# Patient Record
Sex: FEMALE | Race: WHITE | NOT HISPANIC OR LATINO | Employment: FULL TIME | URBAN - METROPOLITAN AREA
[De-identification: names, ages, dates, MRNs, and addresses within clinical notes are randomized per-mention and may not be internally consistent; named-entity substitution may affect disease eponyms.]

---

## 2019-04-05 ENCOUNTER — OFFICE VISIT (OUTPATIENT)
Dept: CARDIOLOGY CLINIC | Facility: CLINIC | Age: 49
End: 2019-04-05
Payer: COMMERCIAL

## 2019-04-05 VITALS
DIASTOLIC BLOOD PRESSURE: 92 MMHG | WEIGHT: 287 LBS | BODY MASS INDEX: 43.5 KG/M2 | HEART RATE: 71 BPM | HEIGHT: 68 IN | SYSTOLIC BLOOD PRESSURE: 140 MMHG

## 2019-04-05 DIAGNOSIS — R00.2 PALPITATIONS: ICD-10-CM

## 2019-04-05 DIAGNOSIS — I10 HYPERTENSION, UNSPECIFIED TYPE: Primary | ICD-10-CM

## 2019-04-05 DIAGNOSIS — R07.9 CHEST PAIN, UNSPECIFIED TYPE: ICD-10-CM

## 2019-04-05 PROCEDURE — 93000 ELECTROCARDIOGRAM COMPLETE: CPT | Performed by: INTERNAL MEDICINE

## 2019-04-05 PROCEDURE — 99243 OFF/OP CNSLTJ NEW/EST LOW 30: CPT | Performed by: INTERNAL MEDICINE

## 2019-04-05 RX ORDER — HYDROCHLOROTHIAZIDE 25 MG/1
25 TABLET ORAL DAILY
Qty: 30 TABLET | Refills: 11 | Status: SHIPPED | OUTPATIENT
Start: 2019-04-05 | End: 2020-03-25 | Stop reason: SDUPTHER

## 2019-04-05 RX ORDER — CYCLOSPORINE 0.5 MG/ML
1 EMULSION OPHTHALMIC 2 TIMES DAILY
COMMUNITY

## 2019-04-05 RX ORDER — ASPIRIN 81 MG/1
81 TABLET ORAL
COMMUNITY
End: 2020-05-26

## 2019-04-05 RX ORDER — METOPROLOL TARTRATE 50 MG/1
50 TABLET, FILM COATED ORAL 2 TIMES DAILY
Refills: 2 | COMMUNITY
Start: 2019-02-08 | End: 2021-12-01 | Stop reason: SDUPTHER

## 2019-04-05 RX ORDER — FLUTICASONE PROPIONATE 50 MCG
SPRAY, SUSPENSION (ML) NASAL
Refills: 4 | COMMUNITY
Start: 2019-01-15

## 2019-04-05 RX ORDER — MONTELUKAST SODIUM 10 MG/1
TABLET ORAL
Refills: 2 | COMMUNITY
Start: 2019-02-08 | End: 2021-05-03 | Stop reason: ALTCHOICE

## 2019-04-05 RX ORDER — LISINOPRIL 10 MG/1
TABLET ORAL
Refills: 4 | COMMUNITY
Start: 2019-03-28 | End: 2021-12-01 | Stop reason: SDUPTHER

## 2019-04-05 RX ORDER — AMLODIPINE BESYLATE 10 MG/1
TABLET ORAL
Refills: 2 | COMMUNITY
Start: 2019-02-08 | End: 2021-12-01 | Stop reason: SDUPTHER

## 2019-05-17 ENCOUNTER — HOSPITAL ENCOUNTER (OUTPATIENT)
Dept: NON INVASIVE DIAGNOSTICS | Facility: CLINIC | Age: 49
Discharge: HOME | End: 2019-05-17
Payer: COMMERCIAL

## 2019-05-17 DIAGNOSIS — I10 HYPERTENSION, UNSPECIFIED TYPE: ICD-10-CM

## 2019-05-17 PROCEDURE — 93306 TTE W/DOPPLER COMPLETE: CPT

## 2019-05-17 PROCEDURE — 93306 TTE W/DOPPLER COMPLETE: CPT | Performed by: INTERNAL MEDICINE

## 2019-05-23 ENCOUNTER — TRANSCRIBE ORDERS (OUTPATIENT)
Dept: ADMINISTRATIVE | Facility: HOSPITAL | Age: 49
End: 2019-05-23

## 2019-05-23 ENCOUNTER — APPOINTMENT (OUTPATIENT)
Dept: RADIOLOGY | Facility: CLINIC | Age: 49
End: 2019-05-23
Payer: COMMERCIAL

## 2019-05-23 DIAGNOSIS — M46.1 SACROILIITIS, NOT ELSEWHERE CLASSIFIED (HCC): ICD-10-CM

## 2019-05-23 DIAGNOSIS — M46.1 SACROILIITIS, NOT ELSEWHERE CLASSIFIED (HCC): Primary | ICD-10-CM

## 2019-05-23 PROCEDURE — 73521 X-RAY EXAM HIPS BI 2 VIEWS: CPT

## 2019-05-23 PROCEDURE — 72202 X-RAY EXAM SI JOINTS 3/> VWS: CPT

## 2019-09-11 ENCOUNTER — TRANSCRIBE ORDERS (OUTPATIENT)
Dept: ADMINISTRATIVE | Facility: HOSPITAL | Age: 49
End: 2019-09-11

## 2019-09-11 ENCOUNTER — APPOINTMENT (OUTPATIENT)
Dept: LAB | Facility: CLINIC | Age: 49
End: 2019-09-11
Payer: COMMERCIAL

## 2019-09-11 DIAGNOSIS — I10 HYPERTENSION, UNSPECIFIED TYPE: ICD-10-CM

## 2019-09-11 DIAGNOSIS — R00.2 PALPITATIONS: ICD-10-CM

## 2019-09-11 LAB
CHOLEST SERPL-MCNC: 174 MG/DL (ref 50–200)
HDLC SERPL-MCNC: 36 MG/DL (ref 40–60)
LDLC SERPL CALC-MCNC: 78 MG/DL (ref 0–100)
TRIGL SERPL-MCNC: 299 MG/DL

## 2019-09-11 PROCEDURE — 36415 COLL VENOUS BLD VENIPUNCTURE: CPT

## 2019-09-11 PROCEDURE — 80061 LIPID PANEL: CPT

## 2019-09-13 NOTE — PROGRESS NOTES
Office Progress Note - Cardiology     Soco Cat 51 y o  female MRN: [id number]    Assessment/Plan:    1  HTN  On Metoprolol tartrate 50 bid, Lisinopril 10, Amlodipine 10 mg daily  HCTZ 25 daily added 4/19  BP controlled/improved in office today  2  Health maintenance  Lipid panel 9/19 showed total cholesterol 174, , HDL 36, LDL 78  She reports she does not believe she eats a lot of carbs/high fat foods  Advised her to try fish oil to help lower TG in long term  3  Chest pain  Atypical  Will defer stress test for now as symptoms not related to exertion  1  Essential hypertension     2  Hypertriglyceridemia     3  Atypical chest pain         HPI: 51 y o  female who is here for follow up of HTN  She reports she has been taking BP meds since around 2000, in her late 25s  She is on Metoprolol, Lisinopril, Amlodipine was prescribed 11/18 due to continued elevated BP  At visit in 4/19, due to slightly elevated BP, HCTZ was added  Echo 5/19 showed normal LV size and function, no diagnostic RWMA, EF 60%  Grade II diastolic dysfunction  Normal RV size and function  She has gained about 20 lbs in last several years, she is trying to lose some weight, has about about 7 lbs on our scale  She is trying to walk for exercise, about 2 miles, no exertional CP or SOB  No orthopnea, uses 1 pillow to sleep, no PND  She does not believes she snores significantly  No current significant LE edema, did have some previously which she had attributed to knee surgery  She occasionally gets "side sticker" type chest pain, not with exertion  Father had MI in mid 46s  She works 12 hour nights as a   Review of Systems:    Review of Systems   Constitution: Positive for weight loss  Negative for chills, decreased appetite, diaphoresis, fever, malaise/fatigue, night sweats and weight gain  HENT: Negative for ear pain, hearing loss, hoarse voice, nosebleeds, sore throat and tinnitus      Eyes: Negative for blurred vision and pain  Cardiovascular: Negative  Negative for chest pain, claudication, cyanosis, dyspnea on exertion, irregular heartbeat, leg swelling, near-syncope, orthopnea, palpitations, paroxysmal nocturnal dyspnea and syncope  Respiratory: Negative for cough, hemoptysis, shortness of breath, sleep disturbances due to breathing, snoring, sputum production and wheezing  Hematologic/Lymphatic: Negative for adenopathy and bleeding problem  Does not bruise/bleed easily  Skin: Negative for color change, dry skin, flushing, itching, poor wound healing and rash  Musculoskeletal: Positive for arthritis and joint pain  Negative for back pain, falls, muscle cramps, muscle weakness, myalgias and neck pain  Gastrointestinal: Negative for abdominal pain, constipation, diarrhea, dysphagia, heartburn, hematemesis, hematochezia, melena, nausea and vomiting  Genitourinary: Negative for dysuria, frequency, hematuria, hesitancy, non-menstrual bleeding and urgency  Neurological: Negative for excessive daytime sleepiness, dizziness, focal weakness, headaches, light-headedness, loss of balance, numbness, paresthesias, tremors, vertigo and weakness  Psychiatric/Behavioral: Negative for altered mental status, depression and memory loss  The patient does not have insomnia and is not nervous/anxious  Allergic/Immunologic: Positive for environmental allergies  Negative for persistent infections       Active Problems:     Patient Active Problem List   Diagnosis    Hypertension       Historical Information   Past Medical History:   Diagnosis Date    Diverticulosis     Dry eye     Seasonal allergies     Thyroid nodule      Past Surgical History:   Procedure Laterality Date    ARTHROSCOPIC REPAIR ACL Left     CHOLECYSTECTOMY      HYSTERECTOMY  2010    KNEE ARTHROSCOPY Right     REDUCTION MAMMAPLASTY  2016     Social History     Substance and Sexual Activity   Alcohol Use Not on file     Social History     Substance and Sexual Activity   Drug Use Not on file     Social History     Tobacco Use   Smoking Status Never Smoker   Smokeless Tobacco Never Used       Family History:   Family History   Problem Relation Age of Onset    Hypertension Mother     Hyperlipidemia Mother     Diabetes type II Mother     Hypertension Father     Heart attack Father     Hyperlipidemia Father     Hypertension Maternal Grandmother        No Known Allergies      Current Outpatient Medications:     amLODIPine (NORVASC) 10 mg tablet, , Disp: , Rfl: 2    cycloSPORINE (RESTASIS) 0 05 % ophthalmic emulsion, 1 drop 2 (two) times a day, Disp: , Rfl:     hydrochlorothiazide (HYDRODIURIL) 25 mg tablet, Take 1 tablet (25 mg total) by mouth daily, Disp: 30 tablet, Rfl: 11    lisinopril (ZESTRIL) 10 mg tablet, , Disp: , Rfl: 4    meloxicam (MOBIC) 15 mg tablet, , Disp: , Rfl: 4    metoprolol tartrate (LOPRESSOR) 50 mg tablet, , Disp: , Rfl: 2    montelukast (SINGULAIR) 10 mg tablet, , Disp: , Rfl: 2    omeprazole (PriLOSEC) 20 mg delayed release capsule, , Disp: , Rfl: 4    aspirin (ECOTRIN LOW STRENGTH) 81 mg EC tablet, Take 81 mg by mouth, Disp: , Rfl:     cyclobenzaprine (FLEXERIL) 10 mg tablet, , Disp: , Rfl: 0    fluticasone (FLONASE) 50 mcg/act nasal spray, , Disp: , Rfl: 4    Objective   Vitals:   Vitals:    09/16/19 0945   BP: 124/86   BP Location: Left arm   Patient Position: Sitting   Cuff Size: Large   Pulse: 74   Weight: 127 kg (280 lb 8 oz)   Height: 5' 8" (1 727 m)          Physical Exam:     GEN: Alert and oriented x 3, in no acute distress  Well appearing and well nourished  HEENT: Sclera anicteric, conjunctivae pink, mucous membranes moist  Oropharynx clear  NECK: Supple, no carotid bruits, no significant JVD  Trachea midline, no thyromegaly  HEART: Regular rhythm, normal S1 and S2, no murmurs, clicks, gallops or rubs  PMI nondisplaced, no thrills     LUNGS: Clear to auscultation bilaterally; no wheezes, rales, or rhonchi  No increased work of breathing or signs of respiratory distress  ABDOMEN: Obese, soft, nontender, nondistended, normoactive bowel sounds  EXTREMITIES: Skin warm and well perfused, no clubbing, cyanosis, or edema  NEURO: No focal findings  Normal gait  Normal speech  Mood and affect normal    SKIN: Normal without suspicious lesions on exposed skin      Lab Results:     No results found for: HGBA1C  No results found for: CHOL  Lab Results   Component Value Date    HDL 36 (L) 09/11/2019     Lab Results   Component Value Date    LDLCALC 78 09/11/2019     Lab Results   Component Value Date    TRIG 299 (H) 09/11/2019     No results found for: CHOLHDL

## 2019-09-16 ENCOUNTER — OFFICE VISIT (OUTPATIENT)
Dept: CARDIOLOGY CLINIC | Facility: CLINIC | Age: 49
End: 2019-09-16
Payer: COMMERCIAL

## 2019-09-16 VITALS
WEIGHT: 280.5 LBS | HEART RATE: 74 BPM | BODY MASS INDEX: 42.51 KG/M2 | HEIGHT: 68 IN | SYSTOLIC BLOOD PRESSURE: 124 MMHG | DIASTOLIC BLOOD PRESSURE: 86 MMHG

## 2019-09-16 DIAGNOSIS — E78.1 HYPERTRIGLYCERIDEMIA: ICD-10-CM

## 2019-09-16 DIAGNOSIS — R07.89 ATYPICAL CHEST PAIN: ICD-10-CM

## 2019-09-16 DIAGNOSIS — I10 ESSENTIAL HYPERTENSION: Primary | ICD-10-CM

## 2019-09-16 PROCEDURE — 3079F DIAST BP 80-89 MM HG: CPT | Performed by: INTERNAL MEDICINE

## 2019-09-16 PROCEDURE — 99214 OFFICE O/P EST MOD 30 MIN: CPT | Performed by: INTERNAL MEDICINE

## 2019-09-16 PROCEDURE — 3074F SYST BP LT 130 MM HG: CPT | Performed by: INTERNAL MEDICINE

## 2019-09-16 RX ORDER — MELOXICAM 15 MG/1
TABLET ORAL
Refills: 4 | COMMUNITY
Start: 2019-08-18 | End: 2021-05-03 | Stop reason: ALTCHOICE

## 2019-09-16 RX ORDER — CYCLOBENZAPRINE HCL 10 MG
TABLET ORAL
Refills: 0 | COMMUNITY
Start: 2019-06-12 | End: 2020-05-26

## 2020-03-25 DIAGNOSIS — I10 HYPERTENSION, UNSPECIFIED TYPE: ICD-10-CM

## 2020-03-25 RX ORDER — HYDROCHLOROTHIAZIDE 25 MG/1
25 TABLET ORAL DAILY
Qty: 90 TABLET | Refills: 3 | Status: SHIPPED | OUTPATIENT
Start: 2020-03-25 | End: 2021-01-02 | Stop reason: HOSPADM

## 2020-05-26 ENCOUNTER — APPOINTMENT (OUTPATIENT)
Dept: RADIOLOGY | Facility: CLINIC | Age: 50
End: 2020-05-26
Payer: COMMERCIAL

## 2020-05-26 ENCOUNTER — OFFICE VISIT (OUTPATIENT)
Dept: URGENT CARE | Facility: CLINIC | Age: 50
End: 2020-05-26
Payer: COMMERCIAL

## 2020-05-26 VITALS
TEMPERATURE: 98 F | OXYGEN SATURATION: 100 % | DIASTOLIC BLOOD PRESSURE: 73 MMHG | RESPIRATION RATE: 16 BRPM | SYSTOLIC BLOOD PRESSURE: 150 MMHG | HEIGHT: 68 IN | BODY MASS INDEX: 40.92 KG/M2 | HEART RATE: 90 BPM | WEIGHT: 270 LBS

## 2020-05-26 DIAGNOSIS — S69.91XA INJURY OF RIGHT WRIST, INITIAL ENCOUNTER: ICD-10-CM

## 2020-05-26 DIAGNOSIS — S69.91XA INJURY OF RIGHT HAND, INITIAL ENCOUNTER: ICD-10-CM

## 2020-05-26 DIAGNOSIS — S69.91XA INJURY OF RIGHT HAND, INITIAL ENCOUNTER: Primary | ICD-10-CM

## 2020-05-26 DIAGNOSIS — S61.219A LACERATION WITHOUT FOREIGN BODY OF UNSPECIFIED FINGER WITHOUT DAMAGE TO NAIL, INITIAL ENCOUNTER: ICD-10-CM

## 2020-05-26 PROCEDURE — 90715 TDAP VACCINE 7 YRS/> IM: CPT | Performed by: PHYSICIAN ASSISTANT

## 2020-05-26 PROCEDURE — 29125 APPL SHORT ARM SPLINT STATIC: CPT | Performed by: PHYSICIAN ASSISTANT

## 2020-05-26 PROCEDURE — 99213 OFFICE O/P EST LOW 20 MIN: CPT | Performed by: PHYSICIAN ASSISTANT

## 2020-05-26 PROCEDURE — 90471 IMMUNIZATION ADMIN: CPT | Performed by: PHYSICIAN ASSISTANT

## 2020-05-26 PROCEDURE — 73130 X-RAY EXAM OF HAND: CPT

## 2020-05-26 PROCEDURE — 73110 X-RAY EXAM OF WRIST: CPT

## 2020-05-26 RX ORDER — CEPHALEXIN 500 MG/1
500 CAPSULE ORAL EVERY 8 HOURS SCHEDULED
Qty: 30 CAPSULE | Refills: 0 | Status: SHIPPED | OUTPATIENT
Start: 2020-05-26 | End: 2020-06-05

## 2020-05-26 RX ORDER — IBUPROFEN 600 MG/1
600 TABLET ORAL EVERY 6 HOURS PRN
Qty: 30 TABLET | Refills: 0 | Status: SHIPPED | OUTPATIENT
Start: 2020-05-26 | End: 2021-01-26 | Stop reason: CLARIF

## 2020-06-05 ENCOUNTER — HOSPITAL ENCOUNTER (EMERGENCY)
Facility: HOSPITAL | Age: 50
Discharge: HOME | End: 2020-06-05
Attending: EMERGENCY MEDICINE | Admitting: EMERGENCY MEDICINE
Payer: COMMERCIAL

## 2020-06-05 ENCOUNTER — APPOINTMENT (EMERGENCY)
Dept: CT IMAGING | Facility: HOSPITAL | Age: 50
End: 2020-06-05
Payer: COMMERCIAL

## 2020-06-05 VITALS
HEIGHT: 68 IN | TEMPERATURE: 98.2 F | SYSTOLIC BLOOD PRESSURE: 144 MMHG | HEART RATE: 78 BPM | DIASTOLIC BLOOD PRESSURE: 70 MMHG | BODY MASS INDEX: 42.83 KG/M2 | RESPIRATION RATE: 16 BRPM | OXYGEN SATURATION: 95 % | WEIGHT: 282.63 LBS

## 2020-06-05 DIAGNOSIS — K04.7 DENTAL ABSCESS: Primary | ICD-10-CM

## 2020-06-05 DIAGNOSIS — E87.6 HYPOKALEMIA: ICD-10-CM

## 2020-06-05 DIAGNOSIS — L03.211 FACIAL CELLULITIS: ICD-10-CM

## 2020-06-05 DIAGNOSIS — J35.1 TONSILLAR ENLARGEMENT: ICD-10-CM

## 2020-06-05 LAB
ALBUMIN SERPL BCP-MCNC: 3.5 G/DL (ref 3.5–5)
ALP SERPL-CCNC: 86 U/L (ref 46–116)
ALT SERPL W P-5'-P-CCNC: 25 U/L (ref 12–78)
ANION GAP SERPL CALCULATED.3IONS-SCNC: 10 MMOL/L (ref 4–13)
APTT PPP: 30 SECONDS (ref 23–37)
AST SERPL W P-5'-P-CCNC: 14 U/L (ref 5–45)
BASOPHILS # BLD AUTO: 0.06 THOUSANDS/ΜL (ref 0–0.1)
BASOPHILS NFR BLD AUTO: 0 % (ref 0–1)
BILIRUB SERPL-MCNC: 0.89 MG/DL (ref 0.2–1)
BUN SERPL-MCNC: 7 MG/DL (ref 5–25)
CALCIUM SERPL-MCNC: 8.5 MG/DL (ref 8.3–10.1)
CHLORIDE SERPL-SCNC: 98 MMOL/L (ref 100–108)
CO2 SERPL-SCNC: 29 MMOL/L (ref 21–32)
CREAT SERPL-MCNC: 0.95 MG/DL (ref 0.6–1.3)
EOSINOPHIL # BLD AUTO: 0.12 THOUSAND/ΜL (ref 0–0.61)
EOSINOPHIL NFR BLD AUTO: 1 % (ref 0–6)
ERYTHROCYTE [DISTWIDTH] IN BLOOD BY AUTOMATED COUNT: 13.2 % (ref 11.6–15.1)
GFR SERPL CREATININE-BSD FRML MDRD: 71 ML/MIN/1.73SQ M
GLUCOSE SERPL-MCNC: 167 MG/DL (ref 65–140)
HCT VFR BLD AUTO: 40.4 % (ref 34.8–46.1)
HGB BLD-MCNC: 13.6 G/DL (ref 11.5–15.4)
IMM GRANULOCYTES # BLD AUTO: 0.12 THOUSAND/UL (ref 0–0.2)
IMM GRANULOCYTES NFR BLD AUTO: 1 % (ref 0–2)
INR PPP: 0.97 (ref 0.84–1.19)
LYMPHOCYTES # BLD AUTO: 2.57 THOUSANDS/ΜL (ref 0.6–4.47)
LYMPHOCYTES NFR BLD AUTO: 16 % (ref 14–44)
MCH RBC QN AUTO: 29.5 PG (ref 26.8–34.3)
MCHC RBC AUTO-ENTMCNC: 33.7 G/DL (ref 31.4–37.4)
MCV RBC AUTO: 88 FL (ref 82–98)
MONOCYTES # BLD AUTO: 1.23 THOUSAND/ΜL (ref 0.17–1.22)
MONOCYTES NFR BLD AUTO: 8 % (ref 4–12)
NEUTROPHILS # BLD AUTO: 11.84 THOUSANDS/ΜL (ref 1.85–7.62)
NEUTS SEG NFR BLD AUTO: 74 % (ref 43–75)
NRBC BLD AUTO-RTO: 0 /100 WBCS
PLATELET # BLD AUTO: 355 THOUSANDS/UL (ref 149–390)
PMV BLD AUTO: 11.1 FL (ref 8.9–12.7)
POTASSIUM SERPL-SCNC: 3.2 MMOL/L (ref 3.5–5.3)
PROT SERPL-MCNC: 7.4 G/DL (ref 6.4–8.2)
PROTHROMBIN TIME: 12.9 SECONDS (ref 11.6–14.5)
RBC # BLD AUTO: 4.61 MILLION/UL (ref 3.81–5.12)
SODIUM SERPL-SCNC: 137 MMOL/L (ref 136–145)
WBC # BLD AUTO: 15.94 THOUSAND/UL (ref 4.31–10.16)

## 2020-06-05 PROCEDURE — 85025 COMPLETE CBC W/AUTO DIFF WBC: CPT | Performed by: EMERGENCY MEDICINE

## 2020-06-05 PROCEDURE — 99285 EMERGENCY DEPT VISIT HI MDM: CPT | Performed by: EMERGENCY MEDICINE

## 2020-06-05 PROCEDURE — 70491 CT SOFT TISSUE NECK W/DYE: CPT

## 2020-06-05 PROCEDURE — 85610 PROTHROMBIN TIME: CPT | Performed by: EMERGENCY MEDICINE

## 2020-06-05 PROCEDURE — 80053 COMPREHEN METABOLIC PANEL: CPT | Performed by: EMERGENCY MEDICINE

## 2020-06-05 PROCEDURE — 99284 EMERGENCY DEPT VISIT MOD MDM: CPT

## 2020-06-05 PROCEDURE — 85730 THROMBOPLASTIN TIME PARTIAL: CPT | Performed by: EMERGENCY MEDICINE

## 2020-06-05 PROCEDURE — 96365 THER/PROPH/DIAG IV INF INIT: CPT

## 2020-06-05 PROCEDURE — 96375 TX/PRO/DX INJ NEW DRUG ADDON: CPT

## 2020-06-05 PROCEDURE — 36415 COLL VENOUS BLD VENIPUNCTURE: CPT | Performed by: EMERGENCY MEDICINE

## 2020-06-05 RX ORDER — KETOROLAC TROMETHAMINE 30 MG/ML
30 INJECTION, SOLUTION INTRAMUSCULAR; INTRAVENOUS ONCE
Status: COMPLETED | OUTPATIENT
Start: 2020-06-05 | End: 2020-06-05

## 2020-06-05 RX ORDER — ACETAMINOPHEN 325 MG/1
975 TABLET ORAL ONCE
Status: DISCONTINUED | OUTPATIENT
Start: 2020-06-05 | End: 2020-06-05

## 2020-06-05 RX ORDER — POTASSIUM CHLORIDE 20 MEQ/1
40 TABLET, EXTENDED RELEASE ORAL ONCE
Status: COMPLETED | OUTPATIENT
Start: 2020-06-05 | End: 2020-06-05

## 2020-06-05 RX ORDER — ACETAMINOPHEN 325 MG/1
975 TABLET ORAL ONCE
Status: COMPLETED | OUTPATIENT
Start: 2020-06-05 | End: 2020-06-05

## 2020-06-05 RX ORDER — CLINDAMYCIN PHOSPHATE 600 MG/50ML
600 INJECTION INTRAVENOUS ONCE
Status: COMPLETED | OUTPATIENT
Start: 2020-06-05 | End: 2020-06-05

## 2020-06-05 RX ADMIN — CLINDAMYCIN PHOSPHATE 600 MG: 600 INJECTION, SOLUTION INTRAVENOUS at 03:51

## 2020-06-05 RX ADMIN — POTASSIUM CHLORIDE 40 MEQ: 1500 TABLET, EXTENDED RELEASE ORAL at 03:51

## 2020-06-05 RX ADMIN — IOHEXOL 100 ML: 350 INJECTION, SOLUTION INTRAVENOUS at 02:23

## 2020-06-05 RX ADMIN — ACETAMINOPHEN 975 MG: 325 TABLET ORAL at 04:29

## 2020-06-05 RX ADMIN — KETOROLAC TROMETHAMINE 30 MG: 30 INJECTION, SOLUTION INTRAMUSCULAR at 01:29

## 2020-10-26 ENCOUNTER — TRANSCRIBE ORDERS (OUTPATIENT)
Dept: ADMINISTRATIVE | Facility: HOSPITAL | Age: 50
End: 2020-10-26

## 2020-10-26 DIAGNOSIS — Z12.31 ENCOUNTER FOR SCREENING MAMMOGRAM FOR MALIGNANT NEOPLASM OF BREAST: ICD-10-CM

## 2020-10-26 DIAGNOSIS — E04.1 NONTOXIC SINGLE THYROID NODULE: Primary | ICD-10-CM

## 2020-11-05 ENCOUNTER — TRANSCRIBE ORDERS (OUTPATIENT)
Dept: ADMINISTRATIVE | Facility: HOSPITAL | Age: 50
End: 2020-11-05

## 2020-11-05 DIAGNOSIS — E04.1 NONTOXIC SINGLE THYROID NODULE: Primary | ICD-10-CM

## 2020-11-06 ENCOUNTER — HOSPITAL ENCOUNTER (OUTPATIENT)
Dept: RADIOLOGY | Facility: CLINIC | Age: 50
Discharge: HOME | End: 2020-11-06
Payer: COMMERCIAL

## 2020-11-06 ENCOUNTER — HOSPITAL ENCOUNTER (OUTPATIENT)
Dept: ULTRASOUND IMAGING | Facility: HOSPITAL | Age: 50
Discharge: HOME | End: 2020-11-06
Payer: COMMERCIAL

## 2020-11-06 VITALS — BODY MASS INDEX: 42.74 KG/M2 | HEIGHT: 68 IN | WEIGHT: 282 LBS

## 2020-11-06 DIAGNOSIS — Z12.31 ENCOUNTER FOR SCREENING MAMMOGRAM FOR MALIGNANT NEOPLASM OF BREAST: ICD-10-CM

## 2020-11-06 DIAGNOSIS — E04.1 NONTOXIC SINGLE THYROID NODULE: ICD-10-CM

## 2020-11-06 PROCEDURE — 76536 US EXAM OF HEAD AND NECK: CPT

## 2020-12-09 ENCOUNTER — HOSPITAL ENCOUNTER (OUTPATIENT)
Dept: RADIOLOGY | Facility: CLINIC | Age: 50
Discharge: HOME | End: 2020-12-09
Payer: COMMERCIAL

## 2020-12-09 VITALS — HEIGHT: 68 IN | WEIGHT: 282 LBS | BODY MASS INDEX: 42.74 KG/M2

## 2020-12-09 DIAGNOSIS — N63.0 BREAST LUMP: ICD-10-CM

## 2020-12-09 PROCEDURE — 76642 ULTRASOUND BREAST LIMITED: CPT

## 2020-12-09 PROCEDURE — G0279 TOMOSYNTHESIS, MAMMO: HCPCS

## 2020-12-09 PROCEDURE — 77066 DX MAMMO INCL CAD BI: CPT

## 2020-12-18 ENCOUNTER — NURSE TRIAGE (OUTPATIENT)
Dept: OTHER | Facility: OTHER | Age: 50
End: 2020-12-18

## 2020-12-25 ENCOUNTER — APPOINTMENT (EMERGENCY)
Dept: CT IMAGING | Facility: HOSPITAL | Age: 50
End: 2020-12-25
Payer: COMMERCIAL

## 2020-12-25 ENCOUNTER — HOSPITAL ENCOUNTER (EMERGENCY)
Facility: HOSPITAL | Age: 50
Discharge: HOME | End: 2020-12-25
Attending: EMERGENCY MEDICINE
Payer: COMMERCIAL

## 2020-12-25 VITALS
RESPIRATION RATE: 18 BRPM | TEMPERATURE: 97.7 F | SYSTOLIC BLOOD PRESSURE: 136 MMHG | WEIGHT: 270 LBS | HEART RATE: 96 BPM | OXYGEN SATURATION: 93 % | BODY MASS INDEX: 41.05 KG/M2 | DIASTOLIC BLOOD PRESSURE: 66 MMHG

## 2020-12-25 DIAGNOSIS — E87.2 LACTIC ACIDOSIS: ICD-10-CM

## 2020-12-25 DIAGNOSIS — R19.7 DIARRHEA, UNSPECIFIED TYPE: ICD-10-CM

## 2020-12-25 DIAGNOSIS — J12.82 PNEUMONIA DUE TO COVID-19 VIRUS: Primary | ICD-10-CM

## 2020-12-25 DIAGNOSIS — U07.1 PNEUMONIA DUE TO COVID-19 VIRUS: Primary | ICD-10-CM

## 2020-12-25 LAB
ALBUMIN SERPL BCP-MCNC: 3.1 G/DL (ref 3.5–5)
ALP SERPL-CCNC: 81 U/L (ref 46–116)
ALT SERPL W P-5'-P-CCNC: 35 U/L (ref 12–78)
ANION GAP SERPL CALCULATED.3IONS-SCNC: 14 MMOL/L (ref 4–13)
AST SERPL W P-5'-P-CCNC: 35 U/L (ref 5–45)
BASE EX.OXY STD BLDV CALC-SCNC: 97 % (ref 60–80)
BASE EXCESS BLDV CALC-SCNC: -0.4 MMOL/L
BASOPHILS # BLD AUTO: 0.02 THOUSANDS/ΜL (ref 0–0.1)
BASOPHILS NFR BLD AUTO: 0 % (ref 0–1)
BILIRUB SERPL-MCNC: 0.51 MG/DL (ref 0.2–1)
BUN SERPL-MCNC: 12 MG/DL (ref 5–25)
CALCIUM ALBUM COR SERPL-MCNC: 8.7 MG/DL (ref 8.3–10.1)
CALCIUM SERPL-MCNC: 8 MG/DL (ref 8.3–10.1)
CHLORIDE SERPL-SCNC: 95 MMOL/L (ref 100–108)
CO2 SERPL-SCNC: 24 MMOL/L (ref 21–32)
CREAT SERPL-MCNC: 1.13 MG/DL (ref 0.6–1.3)
D DIMER PPP FEU-MCNC: 0.51 UG/ML FEU
EOSINOPHIL # BLD AUTO: 0 THOUSAND/ΜL (ref 0–0.61)
EOSINOPHIL NFR BLD AUTO: 0 % (ref 0–6)
ERYTHROCYTE [DISTWIDTH] IN BLOOD BY AUTOMATED COUNT: 12.2 % (ref 11.6–15.1)
FLUAV RNA RESP QL NAA+PROBE: NEGATIVE
FLUBV RNA RESP QL NAA+PROBE: NEGATIVE
GFR SERPL CREATININE-BSD FRML MDRD: 57 ML/MIN/1.73SQ M
GLUCOSE SERPL-MCNC: 285 MG/DL (ref 65–140)
GLUCOSE SERPL-MCNC: 316 MG/DL (ref 65–140)
HCO3 BLDV-SCNC: 22.2 MMOL/L (ref 24–30)
HCT VFR BLD AUTO: 44.6 % (ref 34.8–46.1)
HGB BLD-MCNC: 14.9 G/DL (ref 11.5–15.4)
IMM GRANULOCYTES # BLD AUTO: 0.05 THOUSAND/UL (ref 0–0.2)
IMM GRANULOCYTES NFR BLD AUTO: 1 % (ref 0–2)
LACTATE SERPL-SCNC: 1.8 MMOL/L (ref 0.5–2)
LACTATE SERPL-SCNC: 2.9 MMOL/L (ref 0.5–2)
LYMPHOCYTES # BLD AUTO: 1.01 THOUSANDS/ΜL (ref 0.6–4.47)
LYMPHOCYTES NFR BLD AUTO: 15 % (ref 14–44)
MAGNESIUM SERPL-MCNC: 1.6 MG/DL (ref 1.6–2.6)
MCH RBC QN AUTO: 28.3 PG (ref 26.8–34.3)
MCHC RBC AUTO-ENTMCNC: 33.4 G/DL (ref 31.4–37.4)
MCV RBC AUTO: 85 FL (ref 82–98)
MONOCYTES # BLD AUTO: 0.51 THOUSAND/ΜL (ref 0.17–1.22)
MONOCYTES NFR BLD AUTO: 7 % (ref 4–12)
NEUTROPHILS # BLD AUTO: 5.37 THOUSANDS/ΜL (ref 1.85–7.62)
NEUTS SEG NFR BLD AUTO: 77 % (ref 43–75)
NRBC BLD AUTO-RTO: 0 /100 WBCS
NT-PROBNP SERPL-MCNC: 58 PG/ML
O2 CT BLDV-SCNC: 19.9 ML/DL
PCO2 BLDV: 31.1 MM HG (ref 42–50)
PH BLDV: 7.47 [PH] (ref 7.3–7.4)
PLATELET # BLD AUTO: 224 THOUSANDS/UL (ref 149–390)
PMV BLD AUTO: 11.9 FL (ref 8.9–12.7)
PO2 BLDV: 153.5 MM HG (ref 35–45)
POTASSIUM SERPL-SCNC: 3.6 MMOL/L (ref 3.5–5.3)
PROT SERPL-MCNC: 7.3 G/DL (ref 6.4–8.2)
RBC # BLD AUTO: 5.26 MILLION/UL (ref 3.81–5.12)
RSV RNA RESP QL NAA+PROBE: NEGATIVE
SARS-COV-2 RNA RESP QL NAA+PROBE: POSITIVE
SODIUM SERPL-SCNC: 133 MMOL/L (ref 136–145)
TROPONIN I SERPL-MCNC: <0.02 NG/ML
WBC # BLD AUTO: 6.96 THOUSAND/UL (ref 4.31–10.16)

## 2020-12-25 PROCEDURE — 36415 COLL VENOUS BLD VENIPUNCTURE: CPT | Performed by: PHYSICIAN ASSISTANT

## 2020-12-25 PROCEDURE — 0241U HB NFCT DS VIR RESP RNA 4 TRGT: CPT | Performed by: PHYSICIAN ASSISTANT

## 2020-12-25 PROCEDURE — 80053 COMPREHEN METABOLIC PANEL: CPT | Performed by: PHYSICIAN ASSISTANT

## 2020-12-25 PROCEDURE — 85379 FIBRIN DEGRADATION QUANT: CPT | Performed by: PHYSICIAN ASSISTANT

## 2020-12-25 PROCEDURE — 84484 ASSAY OF TROPONIN QUANT: CPT | Performed by: PHYSICIAN ASSISTANT

## 2020-12-25 PROCEDURE — 82948 REAGENT STRIP/BLOOD GLUCOSE: CPT

## 2020-12-25 PROCEDURE — 85025 COMPLETE CBC W/AUTO DIFF WBC: CPT | Performed by: PHYSICIAN ASSISTANT

## 2020-12-25 PROCEDURE — 99285 EMERGENCY DEPT VISIT HI MDM: CPT | Performed by: EMERGENCY MEDICINE

## 2020-12-25 PROCEDURE — 71275 CT ANGIOGRAPHY CHEST: CPT

## 2020-12-25 PROCEDURE — 93005 ELECTROCARDIOGRAM TRACING: CPT

## 2020-12-25 PROCEDURE — 83880 ASSAY OF NATRIURETIC PEPTIDE: CPT | Performed by: PHYSICIAN ASSISTANT

## 2020-12-25 PROCEDURE — 83605 ASSAY OF LACTIC ACID: CPT | Performed by: PHYSICIAN ASSISTANT

## 2020-12-25 PROCEDURE — 83735 ASSAY OF MAGNESIUM: CPT | Performed by: PHYSICIAN ASSISTANT

## 2020-12-25 PROCEDURE — 96367 TX/PROPH/DG ADDL SEQ IV INF: CPT

## 2020-12-25 PROCEDURE — 96365 THER/PROPH/DIAG IV INF INIT: CPT

## 2020-12-25 PROCEDURE — 99284 EMERGENCY DEPT VISIT MOD MDM: CPT

## 2020-12-25 PROCEDURE — 96361 HYDRATE IV INFUSION ADD-ON: CPT

## 2020-12-25 PROCEDURE — 82805 BLOOD GASES W/O2 SATURATION: CPT | Performed by: PHYSICIAN ASSISTANT

## 2020-12-25 PROCEDURE — G1004 CDSM NDSC: HCPCS

## 2020-12-25 RX ORDER — DOXYCYCLINE HYCLATE 100 MG/1
100 CAPSULE ORAL EVERY 12 HOURS SCHEDULED
Qty: 14 CAPSULE | Refills: 0 | Status: SHIPPED | OUTPATIENT
Start: 2020-12-25 | End: 2021-01-02 | Stop reason: HOSPADM

## 2020-12-25 RX ORDER — ALBUTEROL SULFATE 90 UG/1
2 AEROSOL, METERED RESPIRATORY (INHALATION) EVERY 4 HOURS PRN
Qty: 1 INHALER | Refills: 0 | Status: SHIPPED | OUTPATIENT
Start: 2020-12-25 | End: 2021-01-26 | Stop reason: CLARIF

## 2020-12-25 RX ORDER — CEFTRIAXONE 2 G/50ML
2000 INJECTION, SOLUTION INTRAVENOUS ONCE
Status: COMPLETED | OUTPATIENT
Start: 2020-12-25 | End: 2020-12-25

## 2020-12-25 RX ORDER — BENZONATATE 100 MG/1
100 CAPSULE ORAL EVERY 8 HOURS
Qty: 21 CAPSULE | Refills: 0 | Status: SHIPPED | OUTPATIENT
Start: 2020-12-25 | End: 2021-01-26 | Stop reason: ALTCHOICE

## 2020-12-25 RX ORDER — POTASSIUM CHLORIDE 20 MEQ/1
40 TABLET, EXTENDED RELEASE ORAL ONCE
Status: COMPLETED | OUTPATIENT
Start: 2020-12-25 | End: 2020-12-25

## 2020-12-25 RX ADMIN — DOXYCYCLINE 100 MG: 100 INJECTION, POWDER, LYOPHILIZED, FOR SOLUTION INTRAVENOUS at 20:54

## 2020-12-25 RX ADMIN — SODIUM CHLORIDE 1000 ML: 0.9 INJECTION, SOLUTION INTRAVENOUS at 18:20

## 2020-12-25 RX ADMIN — SODIUM CHLORIDE 1000 ML: 0.9 INJECTION, SOLUTION INTRAVENOUS at 19:18

## 2020-12-25 RX ADMIN — IOHEXOL 100 ML: 350 INJECTION, SOLUTION INTRAVENOUS at 19:16

## 2020-12-25 RX ADMIN — POTASSIUM CHLORIDE 40 MEQ: 1500 TABLET, EXTENDED RELEASE ORAL at 20:29

## 2020-12-25 RX ADMIN — CEFTRIAXONE 2000 MG: 2 INJECTION, SOLUTION INTRAVENOUS at 20:26

## 2020-12-27 ENCOUNTER — HOSPITAL ENCOUNTER (EMERGENCY)
Facility: HOSPITAL | Age: 50
Discharge: HOME | End: 2020-12-27
Attending: EMERGENCY MEDICINE | Admitting: EMERGENCY MEDICINE
Payer: COMMERCIAL

## 2020-12-27 ENCOUNTER — APPOINTMENT (EMERGENCY)
Dept: RADIOLOGY | Facility: HOSPITAL | Age: 50
End: 2020-12-27
Payer: COMMERCIAL

## 2020-12-27 VITALS
HEIGHT: 68 IN | SYSTOLIC BLOOD PRESSURE: 145 MMHG | DIASTOLIC BLOOD PRESSURE: 67 MMHG | RESPIRATION RATE: 18 BRPM | TEMPERATURE: 97.4 F | BODY MASS INDEX: 42.1 KG/M2 | HEART RATE: 93 BPM | OXYGEN SATURATION: 94 % | WEIGHT: 277.78 LBS

## 2020-12-27 DIAGNOSIS — U07.1 PNEUMONIA DUE TO COVID-19 VIRUS: ICD-10-CM

## 2020-12-27 DIAGNOSIS — E87.6 HYPOKALEMIA: ICD-10-CM

## 2020-12-27 DIAGNOSIS — U07.1 COVID-19: ICD-10-CM

## 2020-12-27 DIAGNOSIS — J12.82 PNEUMONIA DUE TO COVID-19 VIRUS: ICD-10-CM

## 2020-12-27 DIAGNOSIS — R55 NEAR SYNCOPE: Primary | ICD-10-CM

## 2020-12-27 DIAGNOSIS — R19.7 DIARRHEA: ICD-10-CM

## 2020-12-27 LAB
ALBUMIN SERPL BCP-MCNC: 2.9 G/DL (ref 3.5–5)
ALP SERPL-CCNC: 71 U/L (ref 46–116)
ALT SERPL W P-5'-P-CCNC: 38 U/L (ref 12–78)
ANION GAP SERPL CALCULATED.3IONS-SCNC: 6 MMOL/L (ref 4–13)
AST SERPL W P-5'-P-CCNC: 60 U/L (ref 5–45)
BASOPHILS # BLD AUTO: 0.01 THOUSANDS/ΜL (ref 0–0.1)
BASOPHILS NFR BLD AUTO: 0 % (ref 0–1)
BILIRUB SERPL-MCNC: 0.6 MG/DL (ref 0.2–1)
BILIRUB UR QL STRIP: NEGATIVE
BUN SERPL-MCNC: 12 MG/DL (ref 5–25)
CALCIUM ALBUM COR SERPL-MCNC: 9 MG/DL (ref 8.3–10.1)
CALCIUM SERPL-MCNC: 8.1 MG/DL (ref 8.3–10.1)
CHLORIDE SERPL-SCNC: 95 MMOL/L (ref 100–108)
CLARITY UR: CLEAR
CO2 SERPL-SCNC: 29 MMOL/L (ref 21–32)
COLOR UR: YELLOW
CREAT SERPL-MCNC: 1.04 MG/DL (ref 0.6–1.3)
EOSINOPHIL # BLD AUTO: 0.01 THOUSAND/ΜL (ref 0–0.61)
EOSINOPHIL NFR BLD AUTO: 0 % (ref 0–6)
ERYTHROCYTE [DISTWIDTH] IN BLOOD BY AUTOMATED COUNT: 12.4 % (ref 11.6–15.1)
GFR SERPL CREATININE-BSD FRML MDRD: 63 ML/MIN/1.73SQ M
GLUCOSE SERPL-MCNC: 207 MG/DL (ref 65–140)
GLUCOSE UR STRIP-MCNC: NEGATIVE MG/DL
HCT VFR BLD AUTO: 39.6 % (ref 34.8–46.1)
HGB BLD-MCNC: 13.5 G/DL (ref 11.5–15.4)
HGB UR QL STRIP.AUTO: NEGATIVE
IMM GRANULOCYTES # BLD AUTO: 0.04 THOUSAND/UL (ref 0–0.2)
IMM GRANULOCYTES NFR BLD AUTO: 1 % (ref 0–2)
KETONES UR STRIP-MCNC: NEGATIVE MG/DL
LACTATE SERPL-SCNC: 1.2 MMOL/L (ref 0.5–2)
LACTATE SERPL-SCNC: 2.2 MMOL/L (ref 0.5–2)
LEUKOCYTE ESTERASE UR QL STRIP: NEGATIVE
LIPASE SERPL-CCNC: 199 U/L (ref 73–393)
LYMPHOCYTES # BLD AUTO: 0.96 THOUSANDS/ΜL (ref 0.6–4.47)
LYMPHOCYTES NFR BLD AUTO: 21 % (ref 14–44)
MCH RBC QN AUTO: 29 PG (ref 26.8–34.3)
MCHC RBC AUTO-ENTMCNC: 34.1 G/DL (ref 31.4–37.4)
MCV RBC AUTO: 85 FL (ref 82–98)
MONOCYTES # BLD AUTO: 0.34 THOUSAND/ΜL (ref 0.17–1.22)
MONOCYTES NFR BLD AUTO: 7 % (ref 4–12)
NEUTROPHILS # BLD AUTO: 3.23 THOUSANDS/ΜL (ref 1.85–7.62)
NEUTS SEG NFR BLD AUTO: 71 % (ref 43–75)
NITRITE UR QL STRIP: NEGATIVE
NRBC BLD AUTO-RTO: 0 /100 WBCS
PH UR STRIP.AUTO: 6.5 [PH]
PLATELET # BLD AUTO: 147 THOUSANDS/UL (ref 149–390)
PMV BLD AUTO: 11.3 FL (ref 8.9–12.7)
POTASSIUM SERPL-SCNC: 3.1 MMOL/L (ref 3.5–5.3)
PROT SERPL-MCNC: 6.7 G/DL (ref 6.4–8.2)
PROT UR STRIP-MCNC: NEGATIVE MG/DL
RBC # BLD AUTO: 4.65 MILLION/UL (ref 3.81–5.12)
SODIUM SERPL-SCNC: 130 MMOL/L (ref 136–145)
SP GR UR STRIP.AUTO: 1.01 (ref 1–1.03)
TROPONIN I SERPL-MCNC: 0.02 NG/ML
UROBILINOGEN UR QL STRIP.AUTO: 0.2 E.U./DL
WBC # BLD AUTO: 4.59 THOUSAND/UL (ref 4.31–10.16)

## 2020-12-27 PROCEDURE — 85025 COMPLETE CBC W/AUTO DIFF WBC: CPT | Performed by: EMERGENCY MEDICINE

## 2020-12-27 PROCEDURE — 96361 HYDRATE IV INFUSION ADD-ON: CPT

## 2020-12-27 PROCEDURE — 36415 COLL VENOUS BLD VENIPUNCTURE: CPT | Performed by: EMERGENCY MEDICINE

## 2020-12-27 PROCEDURE — 83605 ASSAY OF LACTIC ACID: CPT | Performed by: EMERGENCY MEDICINE

## 2020-12-27 PROCEDURE — 99284 EMERGENCY DEPT VISIT MOD MDM: CPT

## 2020-12-27 PROCEDURE — 71045 X-RAY EXAM CHEST 1 VIEW: CPT

## 2020-12-27 PROCEDURE — 80053 COMPREHEN METABOLIC PANEL: CPT | Performed by: EMERGENCY MEDICINE

## 2020-12-27 PROCEDURE — 96374 THER/PROPH/DIAG INJ IV PUSH: CPT

## 2020-12-27 PROCEDURE — 84484 ASSAY OF TROPONIN QUANT: CPT | Performed by: EMERGENCY MEDICINE

## 2020-12-27 PROCEDURE — 81003 URINALYSIS AUTO W/O SCOPE: CPT | Performed by: EMERGENCY MEDICINE

## 2020-12-27 PROCEDURE — 99285 EMERGENCY DEPT VISIT HI MDM: CPT | Performed by: EMERGENCY MEDICINE

## 2020-12-27 PROCEDURE — 83690 ASSAY OF LIPASE: CPT | Performed by: EMERGENCY MEDICINE

## 2020-12-27 PROCEDURE — 93005 ELECTROCARDIOGRAM TRACING: CPT

## 2020-12-27 RX ORDER — ONDANSETRON 4 MG/1
4 TABLET, FILM COATED ORAL EVERY 6 HOURS PRN
Qty: 10 TABLET | Refills: 0 | Status: SHIPPED | OUTPATIENT
Start: 2020-12-27 | End: 2021-01-26 | Stop reason: CLARIF

## 2020-12-27 RX ORDER — POTASSIUM CHLORIDE 20 MEQ/1
40 TABLET, EXTENDED RELEASE ORAL ONCE
Status: COMPLETED | OUTPATIENT
Start: 2020-12-27 | End: 2020-12-27

## 2020-12-27 RX ORDER — ONDANSETRON 2 MG/ML
4 INJECTION INTRAMUSCULAR; INTRAVENOUS ONCE
Status: COMPLETED | OUTPATIENT
Start: 2020-12-27 | End: 2020-12-27

## 2020-12-27 RX ORDER — PREDNISONE 10 MG/1
50 TABLET ORAL DAILY
Qty: 25 TABLET | Refills: 0 | Status: SHIPPED | OUTPATIENT
Start: 2020-12-27 | End: 2021-01-02 | Stop reason: HOSPADM

## 2020-12-27 RX ORDER — SODIUM CHLORIDE 9 MG/ML
250 INJECTION, SOLUTION INTRAVENOUS CONTINUOUS
Status: DISCONTINUED | OUTPATIENT
Start: 2020-12-27 | End: 2020-12-27 | Stop reason: HOSPADM

## 2020-12-27 RX ORDER — ALBUTEROL SULFATE 90 UG/1
4 AEROSOL, METERED RESPIRATORY (INHALATION) ONCE
Status: COMPLETED | OUTPATIENT
Start: 2020-12-27 | End: 2020-12-27

## 2020-12-27 RX ADMIN — POTASSIUM CHLORIDE 40 MEQ: 1500 TABLET, EXTENDED RELEASE ORAL at 08:35

## 2020-12-27 RX ADMIN — SODIUM CHLORIDE 1000 ML: 0.9 INJECTION, SOLUTION INTRAVENOUS at 07:53

## 2020-12-27 RX ADMIN — ONDANSETRON 4 MG: 2 INJECTION INTRAMUSCULAR; INTRAVENOUS at 07:52

## 2020-12-27 RX ADMIN — ALBUTEROL SULFATE 4 PUFF: 90 AEROSOL, METERED RESPIRATORY (INHALATION) at 07:53

## 2020-12-27 RX ADMIN — SODIUM CHLORIDE 250 ML/HR: 0.9 INJECTION, SOLUTION INTRAVENOUS at 09:37

## 2020-12-28 LAB
ATRIAL RATE: 95 BPM
ATRIAL RATE: 99 BPM
P AXIS: 42 DEGREES
P AXIS: 53 DEGREES
PR INTERVAL: 164 MS
PR INTERVAL: 170 MS
QRS AXIS: 19 DEGREES
QRS AXIS: 37 DEGREES
QRSD INTERVAL: 80 MS
QRSD INTERVAL: 82 MS
QT INTERVAL: 326 MS
QT INTERVAL: 330 MS
QTC INTERVAL: 414 MS
QTC INTERVAL: 418 MS
T WAVE AXIS: -2 DEGREES
T WAVE AXIS: 11 DEGREES
VENTRICULAR RATE: 95 BPM
VENTRICULAR RATE: 99 BPM

## 2020-12-29 ENCOUNTER — HOSPITAL ENCOUNTER (INPATIENT)
Facility: HOSPITAL | Age: 50
LOS: 4 days | Discharge: HOME | DRG: 177 | End: 2021-01-02
Attending: EMERGENCY MEDICINE | Admitting: FAMILY MEDICINE
Payer: COMMERCIAL

## 2020-12-29 ENCOUNTER — APPOINTMENT (EMERGENCY)
Dept: RADIOLOGY | Facility: HOSPITAL | Age: 50
DRG: 177 | End: 2020-12-29
Payer: COMMERCIAL

## 2020-12-29 DIAGNOSIS — U07.1 PNEUMONIA DUE TO COVID-19 VIRUS: ICD-10-CM

## 2020-12-29 DIAGNOSIS — J12.82 PNEUMONIA DUE TO COVID-19 VIRUS: ICD-10-CM

## 2020-12-29 DIAGNOSIS — R09.02 HYPOXIA: Primary | ICD-10-CM

## 2020-12-29 DIAGNOSIS — J96.01 ACUTE RESPIRATORY FAILURE WITH HYPOXIA (HCC): ICD-10-CM

## 2020-12-29 DIAGNOSIS — E11.9 DM2 (DIABETES MELLITUS, TYPE 2) (HCC): ICD-10-CM

## 2020-12-29 PROBLEM — E66.813 CLASS 3 SEVERE OBESITY DUE TO EXCESS CALORIES WITHOUT SERIOUS COMORBIDITY WITH BODY MASS INDEX (BMI) OF 40.0 TO 44.9 IN ADULT (HCC): Status: ACTIVE | Noted: 2020-12-29

## 2020-12-29 PROBLEM — E87.6 HYPOKALEMIA: Status: ACTIVE | Noted: 2020-12-29

## 2020-12-29 PROBLEM — E66.01 CLASS 3 SEVERE OBESITY DUE TO EXCESS CALORIES WITHOUT SERIOUS COMORBIDITY WITH BODY MASS INDEX (BMI) OF 40.0 TO 44.9 IN ADULT (HCC): Status: ACTIVE | Noted: 2020-12-29

## 2020-12-29 PROBLEM — E87.1 HYPONATREMIA: Status: ACTIVE | Noted: 2020-12-29

## 2020-12-29 LAB
ABO GROUP BLD: NORMAL
ALBUMIN SERPL BCP-MCNC: 2.5 G/DL (ref 3.5–5)
ALP SERPL-CCNC: 59 U/L (ref 46–116)
ALT SERPL W P-5'-P-CCNC: 30 U/L (ref 12–78)
ANION GAP SERPL CALCULATED.3IONS-SCNC: 11 MMOL/L (ref 4–13)
APTT PPP: 29 SECONDS (ref 23–37)
AST SERPL W P-5'-P-CCNC: 45 U/L (ref 5–45)
BASOPHILS # BLD AUTO: 0.01 THOUSANDS/ΜL (ref 0–0.1)
BASOPHILS NFR BLD AUTO: 0 % (ref 0–1)
BILIRUB SERPL-MCNC: 0.61 MG/DL (ref 0.2–1)
BUN SERPL-MCNC: 12 MG/DL (ref 5–25)
CALCIUM ALBUM COR SERPL-MCNC: 9.1 MG/DL (ref 8.3–10.1)
CALCIUM SERPL-MCNC: 7.9 MG/DL (ref 8.3–10.1)
CHLORIDE SERPL-SCNC: 93 MMOL/L (ref 100–108)
CO2 SERPL-SCNC: 26 MMOL/L (ref 21–32)
CREAT SERPL-MCNC: 1 MG/DL (ref 0.6–1.3)
D DIMER PPP FEU-MCNC: 1.17 UG/ML FEU
EOSINOPHIL # BLD AUTO: 0 THOUSAND/ΜL (ref 0–0.61)
EOSINOPHIL NFR BLD AUTO: 0 % (ref 0–6)
ERYTHROCYTE [DISTWIDTH] IN BLOOD BY AUTOMATED COUNT: 12.2 % (ref 11.6–15.1)
EST. AVERAGE GLUCOSE BLD GHB EST-MCNC: 246 MG/DL
FERRITIN SERPL-MCNC: 548 NG/ML (ref 8–388)
GFR SERPL CREATININE-BSD FRML MDRD: 66 ML/MIN/1.73SQ M
GLUCOSE SERPL-MCNC: 236 MG/DL (ref 65–140)
GLUCOSE SERPL-MCNC: 316 MG/DL (ref 65–140)
HBA1C MFR BLD: 10.2 %
HCT VFR BLD AUTO: 35.1 % (ref 34.8–46.1)
HGB BLD-MCNC: 12 G/DL (ref 11.5–15.4)
IMM GRANULOCYTES # BLD AUTO: 0.06 THOUSAND/UL (ref 0–0.2)
IMM GRANULOCYTES NFR BLD AUTO: 1 % (ref 0–2)
INR PPP: 1.01 (ref 0.84–1.19)
LACTATE SERPL-SCNC: 2 MMOL/L (ref 0.5–2)
LACTATE SERPL-SCNC: 2.1 MMOL/L (ref 0.5–2)
LYMPHOCYTES # BLD AUTO: 0.85 THOUSANDS/ΜL (ref 0.6–4.47)
LYMPHOCYTES NFR BLD AUTO: 12 % (ref 14–44)
MCH RBC QN AUTO: 28.8 PG (ref 26.8–34.3)
MCHC RBC AUTO-ENTMCNC: 34.2 G/DL (ref 31.4–37.4)
MCV RBC AUTO: 84 FL (ref 82–98)
MONOCYTES # BLD AUTO: 0.3 THOUSAND/ΜL (ref 0.17–1.22)
MONOCYTES NFR BLD AUTO: 4 % (ref 4–12)
NEUTROPHILS # BLD AUTO: 5.88 THOUSANDS/ΜL (ref 1.85–7.62)
NEUTS SEG NFR BLD AUTO: 83 % (ref 43–75)
NRBC BLD AUTO-RTO: 0 /100 WBCS
NT-PROBNP SERPL-MCNC: 147 PG/ML
PLATELET # BLD AUTO: 161 THOUSANDS/UL (ref 149–390)
PMV BLD AUTO: 11.6 FL (ref 8.9–12.7)
POTASSIUM SERPL-SCNC: 3 MMOL/L (ref 3.5–5.3)
PROCALCITONIN SERPL-MCNC: 0.15 NG/ML
PROCALCITONIN SERPL-MCNC: 0.18 NG/ML
PROT SERPL-MCNC: 6.4 G/DL (ref 6.4–8.2)
PROTHROMBIN TIME: 13.1 SECONDS (ref 11.6–14.5)
RBC # BLD AUTO: 4.17 MILLION/UL (ref 3.81–5.12)
RH BLD: POSITIVE
SODIUM SERPL-SCNC: 130 MMOL/L (ref 136–145)
TROPONIN I SERPL-MCNC: <0.02 NG/ML
WBC # BLD AUTO: 7.1 THOUSAND/UL (ref 4.31–10.16)

## 2020-12-29 PROCEDURE — XW033E5 INTRODUCTION OF REMDESIVIR ANTI-INFECTIVE INTO PERIPHERAL VEIN, PERCUTANEOUS APPROACH, NEW TECHNOLOGY GROUP 5: ICD-10-PCS | Performed by: FAMILY MEDICINE

## 2020-12-29 PROCEDURE — 80053 COMPREHEN METABOLIC PANEL: CPT | Performed by: EMERGENCY MEDICINE

## 2020-12-29 PROCEDURE — 86900 BLOOD TYPING SEROLOGIC ABO: CPT | Performed by: NURSE PRACTITIONER

## 2020-12-29 PROCEDURE — 36415 COLL VENOUS BLD VENIPUNCTURE: CPT | Performed by: EMERGENCY MEDICINE

## 2020-12-29 PROCEDURE — 85025 COMPLETE CBC W/AUTO DIFF WBC: CPT | Performed by: EMERGENCY MEDICINE

## 2020-12-29 PROCEDURE — 85730 THROMBOPLASTIN TIME PARTIAL: CPT | Performed by: EMERGENCY MEDICINE

## 2020-12-29 PROCEDURE — 87040 BLOOD CULTURE FOR BACTERIA: CPT | Performed by: EMERGENCY MEDICINE

## 2020-12-29 PROCEDURE — 85379 FIBRIN DEGRADATION QUANT: CPT | Performed by: EMERGENCY MEDICINE

## 2020-12-29 PROCEDURE — 83880 ASSAY OF NATRIURETIC PEPTIDE: CPT | Performed by: EMERGENCY MEDICINE

## 2020-12-29 PROCEDURE — 82728 ASSAY OF FERRITIN: CPT | Performed by: NURSE PRACTITIONER

## 2020-12-29 PROCEDURE — 84145 PROCALCITONIN (PCT): CPT | Performed by: EMERGENCY MEDICINE

## 2020-12-29 PROCEDURE — 99285 EMERGENCY DEPT VISIT HI MDM: CPT

## 2020-12-29 PROCEDURE — 96365 THER/PROPH/DIAG IV INF INIT: CPT

## 2020-12-29 PROCEDURE — XW13325 TRANSFUSION OF CONVALESCENT PLASMA (NONAUTOLOGOUS) INTO PERIPHERAL VEIN, PERCUTANEOUS APPROACH, NEW TECHNOLOGY GROUP 5: ICD-10-PCS | Performed by: FAMILY MEDICINE

## 2020-12-29 PROCEDURE — 85610 PROTHROMBIN TIME: CPT | Performed by: EMERGENCY MEDICINE

## 2020-12-29 PROCEDURE — 99223 1ST HOSP IP/OBS HIGH 75: CPT | Performed by: FAMILY MEDICINE

## 2020-12-29 PROCEDURE — 83036 HEMOGLOBIN GLYCOSYLATED A1C: CPT | Performed by: NURSE PRACTITIONER

## 2020-12-29 PROCEDURE — 84484 ASSAY OF TROPONIN QUANT: CPT | Performed by: EMERGENCY MEDICINE

## 2020-12-29 PROCEDURE — 83605 ASSAY OF LACTIC ACID: CPT | Performed by: EMERGENCY MEDICINE

## 2020-12-29 PROCEDURE — 71045 X-RAY EXAM CHEST 1 VIEW: CPT

## 2020-12-29 PROCEDURE — 86901 BLOOD TYPING SEROLOGIC RH(D): CPT | Performed by: NURSE PRACTITIONER

## 2020-12-29 PROCEDURE — 82948 REAGENT STRIP/BLOOD GLUCOSE: CPT

## 2020-12-29 PROCEDURE — 96375 TX/PRO/DX INJ NEW DRUG ADDON: CPT

## 2020-12-29 PROCEDURE — 99285 EMERGENCY DEPT VISIT HI MDM: CPT | Performed by: EMERGENCY MEDICINE

## 2020-12-29 RX ORDER — MONTELUKAST SODIUM 10 MG/1
10 TABLET ORAL
Status: DISCONTINUED | OUTPATIENT
Start: 2020-12-29 | End: 2021-01-02 | Stop reason: HOSPADM

## 2020-12-29 RX ORDER — ALBUTEROL SULFATE 90 UG/1
2 AEROSOL, METERED RESPIRATORY (INHALATION) EVERY 4 HOURS PRN
Status: DISCONTINUED | OUTPATIENT
Start: 2020-12-29 | End: 2021-01-02 | Stop reason: HOSPADM

## 2020-12-29 RX ORDER — AMLODIPINE BESYLATE 10 MG/1
10 TABLET ORAL DAILY
Status: DISCONTINUED | OUTPATIENT
Start: 2020-12-29 | End: 2021-01-02 | Stop reason: HOSPADM

## 2020-12-29 RX ORDER — POTASSIUM CHLORIDE 20 MEQ/1
20 TABLET, EXTENDED RELEASE ORAL ONCE
Status: COMPLETED | OUTPATIENT
Start: 2020-12-29 | End: 2020-12-29

## 2020-12-29 RX ORDER — MELATONIN
2000 DAILY
Status: DISCONTINUED | OUTPATIENT
Start: 2020-12-29 | End: 2021-01-02 | Stop reason: HOSPADM

## 2020-12-29 RX ORDER — BENZONATATE 100 MG/1
100 CAPSULE ORAL EVERY 8 HOURS
Status: DISCONTINUED | OUTPATIENT
Start: 2020-12-29 | End: 2021-01-02 | Stop reason: HOSPADM

## 2020-12-29 RX ORDER — DEXAMETHASONE SODIUM PHOSPHATE 4 MG/ML
6 INJECTION, SOLUTION INTRA-ARTICULAR; INTRALESIONAL; INTRAMUSCULAR; INTRAVENOUS; SOFT TISSUE EVERY 24 HOURS
Status: DISCONTINUED | OUTPATIENT
Start: 2020-12-30 | End: 2020-12-29

## 2020-12-29 RX ORDER — DOXYCYCLINE HYCLATE 100 MG/1
100 CAPSULE ORAL EVERY 12 HOURS SCHEDULED
Status: DISCONTINUED | OUTPATIENT
Start: 2020-12-29 | End: 2020-12-30

## 2020-12-29 RX ORDER — ACETAMINOPHEN 325 MG/1
650 TABLET ORAL ONCE
Status: COMPLETED | OUTPATIENT
Start: 2020-12-29 | End: 2020-12-29

## 2020-12-29 RX ORDER — DOXYCYCLINE HYCLATE 100 MG/1
100 CAPSULE ORAL ONCE
Status: COMPLETED | OUTPATIENT
Start: 2020-12-29 | End: 2020-12-29

## 2020-12-29 RX ORDER — METOPROLOL TARTRATE 50 MG/1
50 TABLET, FILM COATED ORAL EVERY 12 HOURS SCHEDULED
Status: DISCONTINUED | OUTPATIENT
Start: 2020-12-29 | End: 2021-01-02 | Stop reason: HOSPADM

## 2020-12-29 RX ORDER — HYDROCODONE POLISTIREX AND CHLORPHENIRAMINE POLISTIREX 10; 8 MG/5ML; MG/5ML
5 SUSPENSION, EXTENDED RELEASE ORAL EVERY 12 HOURS PRN
Status: DISCONTINUED | OUTPATIENT
Start: 2020-12-29 | End: 2021-01-02 | Stop reason: HOSPADM

## 2020-12-29 RX ORDER — HYDROCHLOROTHIAZIDE 25 MG/1
25 TABLET ORAL DAILY
Status: DISCONTINUED | OUTPATIENT
Start: 2020-12-29 | End: 2020-12-29

## 2020-12-29 RX ORDER — ONDANSETRON 2 MG/ML
4 INJECTION INTRAMUSCULAR; INTRAVENOUS EVERY 6 HOURS PRN
Status: DISCONTINUED | OUTPATIENT
Start: 2020-12-29 | End: 2021-01-02 | Stop reason: HOSPADM

## 2020-12-29 RX ORDER — CEFTRIAXONE 1 G/50ML
1000 INJECTION, SOLUTION INTRAVENOUS ONCE
Status: COMPLETED | OUTPATIENT
Start: 2020-12-29 | End: 2020-12-29

## 2020-12-29 RX ORDER — FLUTICASONE PROPIONATE 50 MCG
1 SPRAY, SUSPENSION (ML) NASAL DAILY
Status: DISCONTINUED | OUTPATIENT
Start: 2020-12-29 | End: 2021-01-02 | Stop reason: HOSPADM

## 2020-12-29 RX ORDER — ACETAMINOPHEN 325 MG/1
650 TABLET ORAL EVERY 6 HOURS PRN
Status: DISCONTINUED | OUTPATIENT
Start: 2020-12-29 | End: 2021-01-02 | Stop reason: HOSPADM

## 2020-12-29 RX ORDER — CEFTRIAXONE 1 G/50ML
1000 INJECTION, SOLUTION INTRAVENOUS EVERY 24 HOURS
Status: DISCONTINUED | OUTPATIENT
Start: 2020-12-30 | End: 2020-12-30

## 2020-12-29 RX ORDER — ONDANSETRON 2 MG/ML
4 INJECTION INTRAMUSCULAR; INTRAVENOUS ONCE
Status: COMPLETED | OUTPATIENT
Start: 2020-12-29 | End: 2020-12-29

## 2020-12-29 RX ORDER — LISINOPRIL 10 MG/1
10 TABLET ORAL DAILY
Status: DISCONTINUED | OUTPATIENT
Start: 2020-12-29 | End: 2021-01-02 | Stop reason: HOSPADM

## 2020-12-29 RX ORDER — DEXAMETHASONE SODIUM PHOSPHATE 10 MG/ML
10 INJECTION, SOLUTION INTRAMUSCULAR; INTRAVENOUS ONCE
Status: COMPLETED | OUTPATIENT
Start: 2020-12-29 | End: 2020-12-29

## 2020-12-29 RX ORDER — ZINC SULFATE 50(220)MG
220 CAPSULE ORAL DAILY
Status: DISCONTINUED | OUTPATIENT
Start: 2020-12-29 | End: 2021-01-02 | Stop reason: HOSPADM

## 2020-12-29 RX ORDER — KETOROLAC TROMETHAMINE 30 MG/ML
30 INJECTION, SOLUTION INTRAMUSCULAR; INTRAVENOUS ONCE
Status: COMPLETED | OUTPATIENT
Start: 2020-12-29 | End: 2020-12-29

## 2020-12-29 RX ORDER — ASCORBIC ACID 500 MG
1000 TABLET ORAL DAILY
Status: DISCONTINUED | OUTPATIENT
Start: 2020-12-29 | End: 2021-01-02 | Stop reason: HOSPADM

## 2020-12-29 RX ORDER — FAMOTIDINE 20 MG/1
20 TABLET, FILM COATED ORAL 2 TIMES DAILY
Status: DISCONTINUED | OUTPATIENT
Start: 2020-12-29 | End: 2020-12-29

## 2020-12-29 RX ADMIN — LISINOPRIL 10 MG: 10 TABLET ORAL at 14:03

## 2020-12-29 RX ADMIN — KETOROLAC TROMETHAMINE 30 MG: 30 INJECTION, SOLUTION INTRAMUSCULAR at 06:03

## 2020-12-29 RX ADMIN — Medication 2000 UNITS: at 08:18

## 2020-12-29 RX ADMIN — AMLODIPINE BESYLATE 10 MG: 10 TABLET ORAL at 14:04

## 2020-12-29 RX ADMIN — METOPROLOL TARTRATE 50 MG: 50 TABLET, FILM COATED ORAL at 21:23

## 2020-12-29 RX ADMIN — DOXYCYCLINE 100 MG: 100 CAPSULE ORAL at 05:59

## 2020-12-29 RX ADMIN — ENOXAPARIN SODIUM 40 MG: 40 INJECTION SUBCUTANEOUS at 14:04

## 2020-12-29 RX ADMIN — ACETAMINOPHEN 650 MG: 325 TABLET ORAL at 05:59

## 2020-12-29 RX ADMIN — METOPROLOL TARTRATE 50 MG: 50 TABLET, FILM COATED ORAL at 14:03

## 2020-12-29 RX ADMIN — CEFTRIAXONE 1000 MG: 1 INJECTION, SOLUTION INTRAVENOUS at 06:08

## 2020-12-29 RX ADMIN — REMDESIVIR 200 MG: 100 INJECTION, POWDER, LYOPHILIZED, FOR SOLUTION INTRAVENOUS at 08:40

## 2020-12-29 RX ADMIN — OXYCODONE HYDROCHLORIDE AND ACETAMINOPHEN 1000 MG: 500 TABLET ORAL at 08:18

## 2020-12-29 RX ADMIN — BENZONATATE 100 MG: 100 CAPSULE ORAL at 23:50

## 2020-12-29 RX ADMIN — HYDROCHLOROTHIAZIDE 25 MG: 25 TABLET ORAL at 14:04

## 2020-12-29 RX ADMIN — HYDROCODONE POLISTIREX AND CHLORPHENIRAMINE POLISTIREX 5 ML: 10; 8 SUSPENSION, EXTENDED RELEASE ORAL at 20:09

## 2020-12-29 RX ADMIN — DOXYCYCLINE 100 MG: 100 CAPSULE ORAL at 20:18

## 2020-12-29 RX ADMIN — FAMOTIDINE 20 MG: 10 INJECTION INTRAVENOUS at 06:02

## 2020-12-29 RX ADMIN — DEXAMETHASONE SODIUM PHOSPHATE 10 MG: 10 INJECTION, SOLUTION INTRAMUSCULAR; INTRAVENOUS at 06:03

## 2020-12-29 RX ADMIN — POTASSIUM CHLORIDE 20 MEQ: 1500 TABLET, EXTENDED RELEASE ORAL at 08:17

## 2020-12-29 RX ADMIN — POTASSIUM CHLORIDE 20 MEQ: 1500 TABLET, EXTENDED RELEASE ORAL at 07:04

## 2020-12-29 RX ADMIN — INSULIN LISPRO 8 UNITS: 100 INJECTION, SOLUTION INTRAVENOUS; SUBCUTANEOUS at 21:21

## 2020-12-29 RX ADMIN — MONTELUKAST 10 MG: 10 TABLET, FILM COATED ORAL at 21:24

## 2020-12-29 RX ADMIN — ONDANSETRON 4 MG: 2 INJECTION INTRAMUSCULAR; INTRAVENOUS at 06:01

## 2020-12-29 RX ADMIN — DOXYCYCLINE 100 MG: 100 CAPSULE ORAL at 08:22

## 2020-12-29 RX ADMIN — FLUTICASONE PROPIONATE 1 SPRAY: 50 SPRAY, METERED NASAL at 08:18

## 2020-12-29 RX ADMIN — BENZONATATE 100 MG: 100 CAPSULE ORAL at 14:03

## 2020-12-29 RX ADMIN — BENZONATATE 100 MG: 100 CAPSULE ORAL at 08:17

## 2020-12-29 RX ADMIN — SODIUM CHLORIDE 1000 ML: 0.9 INJECTION, SOLUTION INTRAVENOUS at 06:07

## 2020-12-29 RX ADMIN — ZINC SULFATE 220 MG (50 MG) CAPSULE 220 MG: CAPSULE at 08:18

## 2020-12-29 RX ADMIN — ENOXAPARIN SODIUM 40 MG: 40 INJECTION SUBCUTANEOUS at 21:21

## 2020-12-29 NOTE — H&P
[facility] Internal Medicine    H&P- Tod Litten 1970, 53 y o  female MRN: [id number]    Unit/Bed#: ED 11 Encounter: [id number]    Primary Care Provider: Raoul Scott DO   Date and time admitted to hospital: 12/29/2020  5:27 AM        * Pneumonia due to COVID-19 virus  Assessment & Plan  · Diagnosed with pneumonia 12/25/2020  · Conversational dyspnea, hypoxia down to 89%-currently on 1 L nasal cannula  · Chest x-ray 12/27/2020: Patchy bilateral peripheral groundglass opacity compatible with Covid 19 pneumonia  · CTA chest 12/27/2020: No evidence of pulmonary artery embolus  Diffuse airspace opacities throughout the lungs which may represent infection such as COVID pneumonia  Short-term follow-up chest CT scan in 3 months is recommended to evaluate for resolution  Mediastinal lymphadenopathy  Follow-up is recommended  · Chest x-ray 12/29/2020: Slight worsening of bilateral bilateral groundglass opacity due to Covid 19 pneumonia  This corresponds with the preliminary interpretation by the emergency department clinician  · Troponin: <0 02, , check CT  · Check CRP ferritin, D-dimer 1 17  · Check procalcitonin  · Received dexamethasone 6 mg in the ER-was on oral steroids at home-will continue IV due to worsening chest x-ray  · Pepcid  · Was on doxycycline oral-will continue this  · Received ceftriaxone IV-will continue this  · Oxygen protocol  · Respiratory protocol  · Enoxaparin for anticoagulation  · Remdesivir    Non-insulin dependent type 2 diabetes mellitus ([facility] [address] )  Assessment & Plan  No results found for: HGBA1C    No results for input(s): POCGLU in the last 72 hours      Blood Sugar Average: Last 72 hrs:    · Glucose 285  · Has been on oral steroids as an outpatient  · Diabetic diet  · Fingerstick blood sugar with sliding scale coverage  · Hold Januvia while in hospital    Class 3 severe obesity due to excess calories with serious comorbidity and body mass index (BMI) of 40 0 to 44 9 in adult [facility])  Assessment & Plan  · Encourage intensive therapeutic lifestyle modification    Hypokalemia  Assessment & Plan  · Potassium 3 0  · Received 20 mEq orally in the ER  · Will give another 20 mEq  · Daily metabolic panel and trend potassium    Hyponatremia  Assessment & Plan  · Sodium 130  · Received 1 L normal saline in the ER  · Recheck metabolic panel and trend sodium    Essential hypertension  Assessment & Plan  · BP reviewed and acceptable  · Continue amlodipine hydrochlorothiazide lisinopril and metoprolol  · Monitor blood pressure    VTE Prophylaxis: Enoxaparin (Lovenox)  / sequential compression device   Code Status:  Full  POLST: POLST form is not discussed and not completed at this time  Discussion with family:  Patient    Anticipated Length of Stay:  Patient will be admitted on an Inpatient basis with an anticipated length of stay of  greater than 2 midnights  Justification for Hospital Stay:  Per plan above    Total Time for Visit, including Counseling / Coordination of Care: 30 minutes  Greater than 50% of this total time spent on direct patient counseling and coordination of care  Chief Complaint:   Increasing shortness of breath, cough    History of Present Illness:    Chauncey Fernandez is a 53 y o  female with history of essential hypertension , non-insulin-dependent type 2 diabetes, obesity,  who presents with increasing shortness of breath, cough  She was diagnosed with COVID pneumonia on 12/25/2020  She had a return visit for worsening symptoms on 12/27, but was discharged home on vitamins steroids as her oxygen saturations were above 95  She comes in today because she said she has an unrelenting cough and worsening shortness of breath  He says she is unable arrest   She also says that she has had a fever, abdominal pain, and diarrhea  She denies dizziness, chest pain, or focal weakness      Review of Systems:    Review of Systems   Constitutional: Positive for chills and fever    Respiratory: Positive for cough and shortness of breath  Cardiovascular: Negative for chest pain, palpitations and leg swelling  Gastrointestinal: Positive for abdominal pain and diarrhea  Negative for abdominal distention, constipation, nausea and vomiting  Genitourinary: Negative for dysuria and frequency  Musculoskeletal: Negative for arthralgias and myalgias  Neurological: Negative for dizziness, syncope, weakness and headaches  All other systems reviewed and are negative  Past Medical and Surgical History:     Past Medical History:   Diagnosis Date    Diverticulosis     Dry eye     Hypertension     Seasonal allergies     Thyroid nodule     Von Willebrand disease ([facility] [address] )        Past Surgical History:   Procedure Laterality Date    ARTHROSCOPIC REPAIR ACL Left     CHOLECYSTECTOMY      HYSTERECTOMY  2010    partial hysterectomy    KNEE ARTHROSCOPY Right     REDUCTION MAMMAPLASTY Bilateral 2016       Meds/Allergies:    Prior to Admission medications    Medication Sig Start Date End Date Taking?  Authorizing Provider   albuterol (PROVENTIL HFA,VENTOLIN HFA) 90 mcg/act inhaler Inhale 2 puffs every 4 (four) hours as needed for wheezing 12/25/20   Yanet Gonzalez PA-C   amLODIPine (NORVASC) 10 mg tablet  2/8/19   Historical Provider, MD   benzonatate (TESSALON PERLES) 100 mg capsule Take 1 capsule (100 mg total) by mouth every 8 (eight) hours 12/25/20   Yanet Gonzalez PA-C   cycloSPORINE (RESTASIS) 0 05 % ophthalmic emulsion 1 drop 2 (two) times a day    Historical Provider, MD   doxycycline hyclate (VIBRAMYCIN) 100 mg capsule Take 1 capsule (100 mg total) by mouth every 12 (twelve) hours for 7 days 12/25/20 1/1/21  Yanet Gonzalez PA-C   fluticasone [facility]) 50 mcg/act nasal spray  1/15/19   Historical Provider, MD   hydrochlorothiazide (HYDRODIURIL) 25 mg tablet Take 1 tablet (25 mg total) by mouth daily 3/25/20   Shira Soliz MD   ibuprofen (MOTRIN) 600 mg tablet Take 1 tablet (600 mg total) by mouth every 6 (six) hours as needed for mild pain 5/26/20   Akila Rae PA-C   lisinopril (ZESTRIL) 10 mg tablet  3/28/19   Historical Provider, MD   meloxicam (MOBIC) 15 mg tablet  8/18/19   Historical Provider, MD   metoprolol tartrate (LOPRESSOR) 50 mg tablet  2/8/19   Historical Provider, MD   montelukast (SINGULAIR) 10 mg tablet  2/8/19   Historical Provider, MD   omeprazole (PriLOSEC) 20 mg delayed release capsule  4/17/19   Historical Provider, MD   ondansetron (ZOFRAN) 4 mg tablet Take 1 tablet (4 mg total) by mouth every 6 (six) hours as needed for nausea or vomiting 12/27/20   Nafisa Lilly DO   predniSONE 10 mg tablet Take 5 tablets (50 mg total) by mouth daily for 5 days 12/27/20 1/1/21  Nafisa Lilly DO     I have reviewed home medications with patient personally  Allergies: No Known Allergies    Social History:     Marital Status: Single   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  Patient Pre-hospital Living Situation:  Home  Patient Pre-hospital Level of Mobility:  Independent  Patient Pre-hospital Diet Restrictions:  None  Substance Use History:   Social History     Substance and Sexual Activity   Alcohol Use Not Currently     Social History     Tobacco Use   Smoking Status Never Smoker   Smokeless Tobacco Never Used     Social History     Substance and Sexual Activity   Drug Use Never       Family History:    non-contributory    Physical Exam:     Vitals:   Blood Pressure: 110/54 (12/29/20 0659)  Pulse: 86 (12/29/20 0659)  Temperature: 98 4 °F (36 9 °C) (12/29/20 0659)  Temp Source: Temporal (12/29/20 0659)  Respirations: 20 (12/29/20 0659)  Height: 5' 8" (172 7 cm) (12/29/20 0659)  Weight - Scale: 126 kg (277 lb 12 5 oz) (12/29/20 0659)  SpO2: 91 % (12/29/20 0659)    Physical Exam  Vitals signs and nursing note reviewed  HENT:      Head: Normocephalic and atraumatic  Mouth/Throat:      Mouth: Mucous membranes are moist       Pharynx: Oropharynx is clear     Eyes: Extraocular Movements: Extraocular movements intact  Pupils: Pupils are equal, round, and reactive to light  Neck:      Musculoskeletal: Normal range of motion and neck supple  Cardiovascular:      Rate and Rhythm: Normal rate and regular rhythm  Pulses: Normal pulses  Heart sounds: Normal heart sounds  Pulmonary:      Effort: Pulmonary effort is normal       Breath sounds: Normal breath sounds  Abdominal:      General: Abdomen is flat  Bowel sounds are normal       Palpations: Abdomen is soft  Musculoskeletal: Normal range of motion  Skin:     General: Skin is warm and dry  Capillary Refill: Capillary refill takes less than 2 seconds  Neurological:      General: No focal deficit present  Mental Status: She is alert and oriented to person, place, and time  Additional Data:     Lab Results: I have personally reviewed pertinent reports  Results from last 7 days   Lab Units 12/29/20  0557   WBC Thousand/uL 7 10   HEMOGLOBIN g/dL 12 0   HEMATOCRIT % 35 1   PLATELETS Thousands/uL 161   NEUTROS PCT % 83*   LYMPHS PCT % 12*   MONOS PCT % 4   EOS PCT % 0     Results from last 7 days   Lab Units 12/29/20  0557   SODIUM mmol/L 130*   POTASSIUM mmol/L 3 0*   CHLORIDE mmol/L 93*   CO2 mmol/L 26   BUN mg/dL 12   CREATININE mg/dL 1 00   ANION GAP mmol/L 11   CALCIUM mg/dL 7 9*   ALBUMIN g/dL 2 5*   TOTAL BILIRUBIN mg/dL 0 61   ALK PHOS U/L 59   ALT U/L 30   AST U/L 45   GLUCOSE RANDOM mg/dL 236*     Results from last 7 days   Lab Units 12/29/20  0557   INR  1 01     Results from last 7 days   Lab Units 12/25/20  1923   POC GLUCOSE mg/dl 285*         Results from last 7 days   Lab Units 12/29/20  0557 12/27/20  0937 12/27/20  0732 12/25/20  2051   LACTIC ACID mmol/L 2 1* 1 2 2 2* 1 8       Imaging: I have personally reviewed pertinent reports        XR chest portable   ED Interpretation by Bassam Hargrove DO (12/29 3412)   Infiltrate      Final Result by Jw Almonte MD (12/29 1381)      Slight worsening of bilateral bilateral groundglass opacity due to Covid 19 pneumonia  This corresponds with the preliminary interpretation by the emergency department clinician  Workstation performed: [id number]               [facility] Records Reviewed: Yes     ** Please Note: This note has been constructed using a voice recognition system   **

## 2020-12-29 NOTE — ASSESSMENT & PLAN NOTE
· Diagnosed with pneumonia 12/25/2020  · Conversational dyspnea, hypoxia down to 89%-currently on 1 L nasal cannula  · Chest x-ray 12/27/2020: Patchy bilateral peripheral groundglass opacity compatible with Covid 19 pneumonia  · CTA chest 12/27/2020: No evidence of pulmonary artery embolus  Diffuse airspace opacities throughout the lungs which may represent infection such as COVID pneumonia  Short-term follow-up chest CT scan in 3 months is recommended to evaluate for resolution  Mediastinal lymphadenopathy  Follow-up is recommended  · Chest x-ray 12/29/2020: Slight worsening of bilateral bilateral groundglass opacity due to Covid 19 pneumonia  This corresponds with the preliminary interpretation by the emergency department clinician     · Troponin: <0 02, , check CT  · Check CRP ferritin, D-dimer 1 17  · Check procalcitonin  · Received dexamethasone 6 mg in the ER-was on oral steroids at home-will continue IV due to worsening chest x-ray  · Pepcid  · Was on doxycycline oral-will continue this  · Received ceftriaxone IV-will continue this  · Oxygen protocol  · Respiratory protocol  · Enoxaparin for anticoagulation  · Remdesivir

## 2020-12-29 NOTE — ED NOTES
Pieter texted Ivanna Cooney, pt requesting medication to help stop coughing        [facility]  12/29/20 5708

## 2020-12-29 NOTE — UTILIZATION REVIEW
Initial Clinical Review    Admission: Date/Time/Statement:   Admission Orders (From admission, onward)     Ordered        12/29/20 0646  Inpatient Admission  Once                   Orders Placed This Encounter   Procedures    Inpatient Admission     Standing Status:   Standing     Number of Occurrences:   1     Order Specific Question:   Admitting Physician     Answer:   Zofia Niño [[id number]]     Order Specific Question:   Level of Care     Answer:   Med Surg [[id number]]     Order Specific Question:   Estimated length of stay     Answer:   Not Applicable     ED Arrival Information     Expected Arrival [address] of Arrival Escorted By Service Admission Type    - 12/29/2020 05:25 Urgent Walk-In Self Hospitalist Urgent    Arrival Complaint    Cough, Difficulty breathing - COVID positive        Chief Complaint   Patient presents with    Shortness of Breath     Patient dx with Covid on 12/25 presents with worsening cough and sob  Assessment/Plan: 54 yo f with a pmh of HTN,  DM2 non insulin dependent and obesity, class 3  She presents to the Ed with increasing SOB and cough, she was diagnosed with COVID pneumonia on 12/25, had a return ed visit on 12/27 but d/c'd home on vitamins and steroids as her sats were above 95% RA, Today she presents with an unrelenting cough and worsening SOB  She had conversational dyspnea with hypoxia sat to 89%, placed on 1 l NC O2  CXR showed worsening opacity c/w COVID pneumonia  She is admitted inpatient started on ABX's and Remdesivir, with respiratory and Oxygen monitoring           ED Triage Vitals   Temperature Pulse Respirations Blood Pressure SpO2   12/29/20 0530 12/29/20 0530 12/29/20 0530 12/29/20 0530 12/29/20 0530   98 °F (36 7 °C) 98 20 126/57 94 %      Temp Source Heart Rate Source Patient Position - Orthostatic VS BP Location FiO2 (%)   12/29/20 0530 12/29/20 0530 12/29/20 0530 12/29/20 0530 --   Temporal Monitor Sitting Left arm       Pain Score       12/29/20 0659 No Pain          Wt Readings from Last 1 Encounters:   12/29/20 126 kg (277 lb 12 5 oz)     Additional Vital Signs:   Date/Time  Temp  Pulse  Resp  BP  MAP (mmHg)  SpO2  O2 Device  Patient Position - Orthostatic VS   12/29/20 0815  --  77  --  121/71  90  91 %  None (Room air)  --   12/29/20 0659  98 4 °F (36 9 °C)  86  20  110/54  --  91 %  None (Room air)  Lying   12/29/20 0645  --  89  18  102/50  72  90 %  None (Room air)  Sitting   12/29/20 0600  --  93  20  138/69  96  92 %  None (Room air)  Sitting         Pertinent Labs/Diagnostic Test Results:   Results from last 7 days   Lab Units 12/25/20  1820   SARS-COV-2  Positive*     Results from last 7 days   Lab Units 12/29/20  0557 12/27/20  0732 12/25/20  1820   WBC Thousand/uL 7 10 4 59 6 96   HEMOGLOBIN g/dL 12 0 13 5 14 9   HEMATOCRIT % 35 1 39 6 44 6   PLATELETS Thousands/uL 161 147* 224   NEUTROS ABS Thousands/µL 5 88 3 23 5 37         Results from last 7 days   Lab Units 12/29/20  0557 12/27/20  0732 12/25/20  1820   SODIUM mmol/L 130* 130* 133*   POTASSIUM mmol/L 3 0* 3 1* 3 6   CHLORIDE mmol/L 93* 95* 95*   CO2 mmol/L 26 29 24   ANION GAP mmol/L 11 6 14*   BUN mg/dL 12 12 12   CREATININE mg/dL 1 00 1 04 1 13   EGFR ml/min/1 73sq m 66 63 57   CALCIUM mg/dL 7 9* 8 1* 8 0*   MAGNESIUM mg/dL  --   --  1 6     Results from last 7 days   Lab Units 12/29/20  0557 12/27/20  0732 12/25/20  1820   AST U/L 45 60* 35   ALT U/L 30 38 35   ALK PHOS U/L 59 71 81   TOTAL PROTEIN g/dL 6 4 6 7 7 3   ALBUMIN g/dL 2 5* 2 9* 3 1*   TOTAL BILIRUBIN mg/dL 0 61 0 60 0 51     Results from last 7 days   Lab Units 12/25/20  1923   POC GLUCOSE mg/dl 285*     Results from last 7 days   Lab Units 12/29/20  0557 12/27/20  0732 12/25/20  1820   GLUCOSE RANDOM mg/dL 236* 207* 316*     Results from last 7 days   Lab Units 12/25/20  1924   PH MANGO  7 472*   PCO2 MANGO mm Hg 31 1*   PO2 MANGO mm Hg 153 5*   HCO3 MANGO mmol/L 22 2*   BASE EXC MANGO mmol/L -0 4   O2 CONTENT MANGO ml/dL 19 9   O2 HGB, VENOUS % 97 0*     Results from last 7 days   Lab Units 12/29/20  0557 12/27/20  0732 12/25/20  1820   TROPONIN I ng/mL <0 02 0 02 <0 02     Results from last 7 days   Lab Units 12/29/20  0557 12/25/20  1820   D-DIMER QUANTITATIVE ug/ml FEU 1 17* 0 51*     Results from last 7 days   Lab Units 12/29/20  0557   PROTIME seconds 13 1   INR  1 01   PTT seconds 29     Results from last 7 days   Lab Units 12/29/20  0557 12/27/20  0937 12/27/20  0732 12/25/20  2051 12/25/20  1820   LACTIC ACID mmol/L 2 1* 1 2 2 2* 1 8 2 9*     Results from last 7 days   Lab Units 12/29/20  0557 12/25/20  1820   NT-PRO BNP pg/mL 147* 58     Results from last 7 days   Lab Units 12/27/20  0732   LIPASE u/L 199     Results from last 7 days   Lab Units 12/27/20  0756   CLARITY UA  Clear   COLOR UA  Yellow   SPEC GRAV UA  1 015   PH UA  6 5   GLUCOSE UA mg/dl Negative   KETONES UA mg/dl Negative   BLOOD UA  Negative   PROTEIN UA mg/dl Negative   NITRITE UA  Negative   BILIRUBIN UA  Negative   UROBILINOGEN UA E U /dl 0 2   LEUKOCYTES UA  Negative     Results from last 7 days   Lab Units 12/25/20  1820   INFLUENZA A PCR  Negative   INFLUENZA B PCR  Negative   RSV PCR  Negative     CXR 12/29 - Slight worsening of bilateral bilateral groundglass opacity due to Covid 19 pneumonia  CXR 12/27 - Patchy bilateral peripheral groundglass opacity compatible with Covid 19 pneumonia  CTA Chest 12/25 - No evidence of pulmonary artery embolus  Diffuse airspace opacities throughout the lungs which may represent infection such as COVID pneumonia   Short-term follow-up chest CT scan in 3 months is recommended to evaluate for resolution  Mediastinal lymphadenopathy   Follow-up is recommended        ED Treatment:   Medication Administration from 12/29/2020 0523 to 12/29/2020 0957       Date/Time Order Dose Route Action Comments     12/29/2020 0607 sodium chloride 0 9 % bolus 1,000 mL 1,000 mL Intravenous New Bag      12/29/2020 0603 dexamethasone (PF) (DECADRON) injection 10 mg 10 mg Intravenous Given      12/29/2020 0603 ketorolac (TORADOL) injection 30 mg 30 mg Intravenous Given      12/29/2020 0601 ondansetron (ZOFRAN) injection 4 mg 4 mg Intravenous Given      12/29/2020 0602 famotidine (PEPCID) injection 20 mg 20 mg Intravenous Given      12/29/2020 0559 acetaminophen (TYLENOL) tablet 650 mg 650 mg Oral Given      12/29/2020 0559 doxycycline hyclate (VIBRAMYCIN) capsule 100 mg 100 mg Oral Given      12/29/2020 0818 ascorbic acid (VITAMIN C) tablet 1,000 mg 1,000 mg Oral Given      12/29/2020 0818 cholecalciferol (VITAMIN D3) tablet 2,000 Units 2,000 Units Oral Given      12/29/2020 1088 cefTRIAXone (ROCEPHIN) IVPB (premix in dextrose) 1,000 mg 50 mL 1,000 mg Intravenous New Bag      12/29/2020 0704 potassium chloride (K-DUR,KLOR-CON) CR tablet 20 mEq 20 mEq Oral Given      12/29/2020 0818 zinc sulfate (ZINCATE) capsule 220 mg 220 mg Oral Given      12/29/2020 0818 fluticasone (FLONASE) 50 mcg/act nasal spray 1 spray 1 spray Each Nare Given      12/29/2020 8911 doxycycline hyclate (VIBRAMYCIN) capsule 100 mg 100 mg Oral Given      12/29/2020 0817 benzonatate (TESSALON PERLES) capsule 100 mg 100 mg Oral Given      12/29/2020 0817 potassium chloride (K-DUR,KLOR-CON) CR tablet 20 mEq 20 mEq Oral Given      12/29/2020 0840 remdesivir (Veklury) 200 mg in sodium chloride 0 9 % 250 mL IVPB 200 mg Intravenous New Bag         Past Medical History:   Diagnosis Date    Diverticulosis     Dry eye     Hypertension     Seasonal allergies     Thyroid nodule     Von Willebrand disease ([facility] [address] )      Present on Admission:   Essential hypertension      Admitting Diagnosis: Shortness of breath [R06 02]  Age/Sex: 53 y o  female     Admission Orders:  Scheduled Medications:  ascorbic acid, 1,000 mg, Oral, Daily  benzonatate, 100 mg, Oral, Q8H  [START ON 12/30/2020] cefTRIAXone, 1,000 mg, Intravenous, Q24H  cholecalciferol, 2,000 Units, Oral, Daily  doxycycline hyclate, 100 mg, Oral, Q12H [facility]  fluticasone, 1 spray, Each Nare, Daily  montelukast, 10 mg, Oral, HS  [START ON 12/30/2020] remdesivir, 100 mg, Intravenous, Q24H  zinc sulfate, 220 mg, Oral, Daily      Continuous IV Infusions:     PRN Meds:  albuterol, 2 puff, Inhalation, Q4H PRN            Network Utilization Review Department  ATTENTION: Please call with any questions or concerns to [phone] and carefully listen to the prompts so that you are directed to the right person  All voicemails are confidential   Laila Redding all requests for admission clinical reviews, approved or denied determinations and any other requests to dedicated fax number below belonging to the campus where the patient is receiving treatment   List of dedicated fax numbers for the Facilities:  [address] DENIALS (Administrative/Medical Necessity) [phone]   [address] (Maternity/NICU/Pediatrics) [phone]   [address] [place] [address] ([facility], [facility]) [address] [place] [address] [address] [place] [address] [phone]

## 2020-12-29 NOTE — ASSESSMENT & PLAN NOTE
· BP reviewed and acceptable  · Continue amlodipine hydrochlorothiazide lisinopril and metoprolol  · Monitor blood pressure

## 2020-12-29 NOTE — PLAN OF CARE
Problem: CARDIOVASCULAR - ADULT  Goal: Maintains optimal cardiac output and hemodynamic stability  Description: INTERVENTIONS:  - Monitor I/O, vital signs and rhythm  - Monitor for S/S and trends of decreased cardiac output  - Administer and titrate ordered vasoactive medications to optimize hemodynamic stability  - Assess quality of pulses, skin color and temperature  - Assess for signs of decreased coronary artery perfusion  - Instruct patient to report change in severity of symptoms  Outcome: Progressing  Goal: Absence of cardiac dysrhythmias or at baseline rhythm  Description: INTERVENTIONS:  - Continuous cardiac monitoring, vital signs, obtain 12 lead EKG if ordered  - Administer antiarrhythmic and heart rate control medications as ordered  - Monitor electrolytes and administer replacement therapy as ordered  Outcome: Progressing     Problem: RESPIRATORY - ADULT  Goal: Achieves optimal ventilation and oxygenation  Description: INTERVENTIONS:  - Assess for changes in respiratory status  - Assess for changes in mentation and behavior  - Position to facilitate oxygenation and minimize respiratory effort  - Oxygen administered by appropriate delivery if ordered  - Initiate smoking cessation education as indicated  - Encourage broncho-pulmonary hygiene including cough, deep breathe, Incentive Spirometry  - Assess the need for suctioning and aspirate as needed  - Assess and instruct to report SOB or any respiratory difficulty  - Respiratory Therapy support as indicated  Outcome: Progressing     Problem: METABOLIC, FLUID AND ELECTROLYTES - ADULT  Goal: Electrolytes maintained within normal limits  Description: INTERVENTIONS:  - Monitor labs and assess patient for signs and symptoms of electrolyte imbalances  - Administer electrolyte replacement as ordered  - Monitor response to electrolyte replacements, including repeat lab results as appropriate  - Instruct patient on fluid and nutrition as appropriate  Outcome: Progressing  Goal: Fluid balance maintained  Description: INTERVENTIONS:  - Monitor labs   - Monitor I/O and WT  - Instruct patient on fluid and nutrition as appropriate  - Assess for signs & symptoms of volume excess or deficit  Outcome: Progressing  Goal: Glucose maintained within target range  Description: INTERVENTIONS:  - Monitor Blood Glucose as ordered  - Assess for signs and symptoms of hyperglycemia and hypoglycemia  - Administer ordered medications to maintain glucose within target range  - Assess nutritional intake and initiate nutrition service referral as needed  Outcome: Progressing

## 2020-12-29 NOTE — ASSESSMENT & PLAN NOTE
· Potassium 3 0  · Received 20 mEq orally in the ER  · Will give another 20 mEq  · Daily metabolic panel and trend potassium

## 2020-12-29 NOTE — ASSESSMENT & PLAN NOTE
No results found for: HGBA1C    No results for input(s): POCGLU in the last 72 hours      Blood Sugar Average: Last 72 hrs:    · Glucose 285  · Has been on oral steroids as an outpatient  · Diabetic diet  · Fingerstick blood sugar with sliding scale coverage  · Hold Januvia while in hospital

## 2020-12-30 PROBLEM — J96.01 ACUTE RESPIRATORY FAILURE WITH HYPOXIA (HCC): Status: ACTIVE | Noted: 2020-12-30

## 2020-12-30 LAB
ALBUMIN SERPL BCP-MCNC: 2.3 G/DL (ref 3.5–5)
ALP SERPL-CCNC: 64 U/L (ref 46–116)
ALT SERPL W P-5'-P-CCNC: 25 U/L (ref 12–78)
ANION GAP SERPL CALCULATED.3IONS-SCNC: 10 MMOL/L (ref 4–13)
AST SERPL W P-5'-P-CCNC: 26 U/L (ref 5–45)
BASOPHILS # BLD AUTO: 0.01 THOUSANDS/ΜL (ref 0–0.1)
BASOPHILS NFR BLD AUTO: 0 % (ref 0–1)
BILIRUB SERPL-MCNC: 0.57 MG/DL (ref 0.2–1)
BUN SERPL-MCNC: 14 MG/DL (ref 5–25)
CALCIUM ALBUM COR SERPL-MCNC: 9.5 MG/DL (ref 8.3–10.1)
CALCIUM SERPL-MCNC: 8.1 MG/DL (ref 8.3–10.1)
CHLORIDE SERPL-SCNC: 97 MMOL/L (ref 100–108)
CO2 SERPL-SCNC: 26 MMOL/L (ref 21–32)
CREAT SERPL-MCNC: 0.85 MG/DL (ref 0.6–1.3)
CRP SERPL QL: 94.3 MG/L
EOSINOPHIL # BLD AUTO: 0 THOUSAND/ΜL (ref 0–0.61)
EOSINOPHIL NFR BLD AUTO: 0 % (ref 0–6)
ERYTHROCYTE [DISTWIDTH] IN BLOOD BY AUTOMATED COUNT: 12.3 % (ref 11.6–15.1)
GFR SERPL CREATININE-BSD FRML MDRD: 81 ML/MIN/1.73SQ M
GLUCOSE SERPL-MCNC: 231 MG/DL (ref 65–140)
GLUCOSE SERPL-MCNC: 236 MG/DL (ref 65–140)
GLUCOSE SERPL-MCNC: 260 MG/DL (ref 65–140)
GLUCOSE SERPL-MCNC: 292 MG/DL (ref 65–140)
GLUCOSE SERPL-MCNC: 295 MG/DL (ref 65–140)
HCT VFR BLD AUTO: 35.6 % (ref 34.8–46.1)
HGB BLD-MCNC: 11.8 G/DL (ref 11.5–15.4)
IMM GRANULOCYTES # BLD AUTO: 0.08 THOUSAND/UL (ref 0–0.2)
IMM GRANULOCYTES NFR BLD AUTO: 1 % (ref 0–2)
LYMPHOCYTES # BLD AUTO: 0.73 THOUSANDS/ΜL (ref 0.6–4.47)
LYMPHOCYTES NFR BLD AUTO: 9 % (ref 14–44)
MCH RBC QN AUTO: 28.2 PG (ref 26.8–34.3)
MCHC RBC AUTO-ENTMCNC: 33.1 G/DL (ref 31.4–37.4)
MCV RBC AUTO: 85 FL (ref 82–98)
MONOCYTES # BLD AUTO: 0.46 THOUSAND/ΜL (ref 0.17–1.22)
MONOCYTES NFR BLD AUTO: 6 % (ref 4–12)
NEUTROPHILS # BLD AUTO: 6.76 THOUSANDS/ΜL (ref 1.85–7.62)
NEUTS SEG NFR BLD AUTO: 84 % (ref 43–75)
NRBC BLD AUTO-RTO: 0 /100 WBCS
PLATELET # BLD AUTO: 177 THOUSANDS/UL (ref 149–390)
PMV BLD AUTO: 11.8 FL (ref 8.9–12.7)
POTASSIUM SERPL-SCNC: 3.3 MMOL/L (ref 3.5–5.3)
PROT SERPL-MCNC: 6.3 G/DL (ref 6.4–8.2)
RBC # BLD AUTO: 4.18 MILLION/UL (ref 3.81–5.12)
SODIUM SERPL-SCNC: 133 MMOL/L (ref 136–145)
WBC # BLD AUTO: 8.04 THOUSAND/UL (ref 4.31–10.16)

## 2020-12-30 PROCEDURE — 86140 C-REACTIVE PROTEIN: CPT | Performed by: NURSE PRACTITIONER

## 2020-12-30 PROCEDURE — 94760 N-INVAS EAR/PLS OXIMETRY 1: CPT

## 2020-12-30 PROCEDURE — 80053 COMPREHEN METABOLIC PANEL: CPT | Performed by: NURSE PRACTITIONER

## 2020-12-30 PROCEDURE — 82948 REAGENT STRIP/BLOOD GLUCOSE: CPT

## 2020-12-30 PROCEDURE — 99232 SBSQ HOSP IP/OBS MODERATE 35: CPT | Performed by: FAMILY MEDICINE

## 2020-12-30 PROCEDURE — 85025 COMPLETE CBC W/AUTO DIFF WBC: CPT | Performed by: NURSE PRACTITIONER

## 2020-12-30 RX ORDER — POTASSIUM CHLORIDE 20 MEQ/1
40 TABLET, EXTENDED RELEASE ORAL ONCE
Status: COMPLETED | OUTPATIENT
Start: 2020-12-30 | End: 2020-12-30

## 2020-12-30 RX ORDER — LANOLIN ALCOHOL/MO/W.PET/CERES
6 CREAM (GRAM) TOPICAL
Status: DISCONTINUED | OUTPATIENT
Start: 2020-12-30 | End: 2021-01-02 | Stop reason: HOSPADM

## 2020-12-30 RX ORDER — DEXAMETHASONE SODIUM PHOSPHATE 4 MG/ML
6 INJECTION, SOLUTION INTRA-ARTICULAR; INTRALESIONAL; INTRAMUSCULAR; INTRAVENOUS; SOFT TISSUE EVERY 24 HOURS
Status: DISCONTINUED | OUTPATIENT
Start: 2020-12-30 | End: 2021-01-02 | Stop reason: HOSPADM

## 2020-12-30 RX ORDER — GUAIFENESIN 100 MG/5ML
200 SOLUTION ORAL EVERY 4 HOURS PRN
Status: DISCONTINUED | OUTPATIENT
Start: 2020-12-30 | End: 2021-01-02 | Stop reason: HOSPADM

## 2020-12-30 RX ORDER — FAMOTIDINE 20 MG/1
20 TABLET, FILM COATED ORAL 2 TIMES DAILY
Status: DISCONTINUED | OUTPATIENT
Start: 2020-12-30 | End: 2021-01-02 | Stop reason: HOSPADM

## 2020-12-30 RX ADMIN — INSULIN HUMAN 20 UNITS: 100 INJECTION, SUSPENSION SUBCUTANEOUS at 17:00

## 2020-12-30 RX ADMIN — BENZONATATE 100 MG: 100 CAPSULE ORAL at 06:29

## 2020-12-30 RX ADMIN — GUAIFENESIN 200 MG: 100 SOLUTION ORAL at 10:13

## 2020-12-30 RX ADMIN — MELATONIN TAB 3 MG 6 MG: 3 TAB at 22:05

## 2020-12-30 RX ADMIN — INSULIN LISPRO 6 UNITS: 100 INJECTION, SOLUTION INTRAVENOUS; SUBCUTANEOUS at 16:30

## 2020-12-30 RX ADMIN — MONTELUKAST 10 MG: 10 TABLET, FILM COATED ORAL at 22:05

## 2020-12-30 RX ADMIN — GUAIFENESIN 200 MG: 100 SOLUTION ORAL at 14:55

## 2020-12-30 RX ADMIN — POTASSIUM CHLORIDE 40 MEQ: 1500 TABLET, EXTENDED RELEASE ORAL at 14:43

## 2020-12-30 RX ADMIN — ENOXAPARIN SODIUM 40 MG: 40 INJECTION SUBCUTANEOUS at 08:24

## 2020-12-30 RX ADMIN — ENOXAPARIN SODIUM 40 MG: 40 INJECTION SUBCUTANEOUS at 20:25

## 2020-12-30 RX ADMIN — INSULIN LISPRO 4 UNITS: 100 INJECTION, SOLUTION INTRAVENOUS; SUBCUTANEOUS at 13:14

## 2020-12-30 RX ADMIN — BENZONATATE 100 MG: 100 CAPSULE ORAL at 14:55

## 2020-12-30 RX ADMIN — BENZONATATE 100 MG: 100 CAPSULE ORAL at 22:15

## 2020-12-30 RX ADMIN — REMDESIVIR 100 MG: 100 INJECTION, POWDER, LYOPHILIZED, FOR SOLUTION INTRAVENOUS at 08:03

## 2020-12-30 RX ADMIN — ZINC SULFATE 220 MG (50 MG) CAPSULE 220 MG: CAPSULE at 08:25

## 2020-12-30 RX ADMIN — DOXYCYCLINE 100 MG: 100 CAPSULE ORAL at 08:25

## 2020-12-30 RX ADMIN — LISINOPRIL 10 MG: 10 TABLET ORAL at 08:24

## 2020-12-30 RX ADMIN — FAMOTIDINE 20 MG: 20 TABLET, FILM COATED ORAL at 10:13

## 2020-12-30 RX ADMIN — METOPROLOL TARTRATE 50 MG: 50 TABLET, FILM COATED ORAL at 20:25

## 2020-12-30 RX ADMIN — HYDROCODONE POLISTIREX AND CHLORPHENIRAMINE POLISTIREX 5 ML: 10; 8 SUSPENSION, EXTENDED RELEASE ORAL at 08:31

## 2020-12-30 RX ADMIN — Medication 2000 UNITS: at 08:25

## 2020-12-30 RX ADMIN — OXYCODONE HYDROCHLORIDE AND ACETAMINOPHEN 1000 MG: 500 TABLET ORAL at 08:25

## 2020-12-30 RX ADMIN — INSULIN LISPRO 4 UNITS: 100 INJECTION, SOLUTION INTRAVENOUS; SUBCUTANEOUS at 07:51

## 2020-12-30 RX ADMIN — CEFTRIAXONE 1000 MG: 1 INJECTION, SOLUTION INTRAVENOUS at 05:33

## 2020-12-30 RX ADMIN — AMLODIPINE BESYLATE 10 MG: 10 TABLET ORAL at 08:25

## 2020-12-30 RX ADMIN — METOPROLOL TARTRATE 50 MG: 50 TABLET, FILM COATED ORAL at 08:25

## 2020-12-30 RX ADMIN — HYDROCODONE POLISTIREX AND CHLORPHENIRAMINE POLISTIREX 5 ML: 10; 8 SUSPENSION, EXTENDED RELEASE ORAL at 22:05

## 2020-12-30 RX ADMIN — INSULIN LISPRO 6 UNITS: 100 INJECTION, SOLUTION INTRAVENOUS; SUBCUTANEOUS at 22:07

## 2020-12-30 RX ADMIN — FLUTICASONE PROPIONATE 1 SPRAY: 50 SPRAY, METERED NASAL at 10:13

## 2020-12-30 RX ADMIN — ALBUTEROL SULFATE 2 PUFF: 90 AEROSOL, METERED RESPIRATORY (INHALATION) at 06:29

## 2020-12-30 RX ADMIN — DEXAMETHASONE SODIUM PHOSPHATE 6 MG: 4 INJECTION, SOLUTION INTRAMUSCULAR; INTRAVENOUS at 10:12

## 2020-12-30 RX ADMIN — FAMOTIDINE 20 MG: 20 TABLET, FILM COATED ORAL at 17:48

## 2020-12-30 NOTE — ED PROVIDER NOTES
History  Chief Complaint   Patient presents with    Shortness of Breath     Patient dx with Covid on  presents with worsening cough and sob  31-year-old with recent diagnosis of COVID-19 presents ED with shortness, cough  Shortness of Breath  Severity:  Moderate  Onset quality:  Sudden  Timing:  Constant  Progression:  Worsening  Context: URI    Relieved by:  Rest  Worsened by:  Deep breathing  Ineffective treatments:  Inhaler  Associated symptoms: cough, fever and sore throat    Associated symptoms: no abdominal pain, no chest pain, no hemoptysis, no neck pain, no swollen glands, no vomiting and no wheezing        Prior to Admission Medications   Prescriptions Last Dose Informant Patient Reported? Taking?    albuterol (PROVENTIL HFA,VENTOLIN HFA) 90 mcg/act inhaler   No No   Sig: Inhale 2 puffs every 4 (four) hours as needed for wheezing   amLODIPine (NORVASC) 10 mg tablet  Self Yes No   benzonatate (TESSALON PERLES) 100 mg capsule   No No   Sig: Take 1 capsule (100 mg total) by mouth every 8 (eight) hours   cycloSPORINE (RESTASIS) 0 05 % ophthalmic emulsion  Self Yes No   Si drop 2 (two) times a day   doxycycline hyclate (VIBRAMYCIN) 100 mg capsule   No No   Sig: Take 1 capsule (100 mg total) by mouth every 12 (twelve) hours for 7 days   fluticasone (FLONASE) 50 mcg/act nasal spray  Self Yes No   hydrochlorothiazide (HYDRODIURIL) 25 mg tablet   No No   Sig: Take 1 tablet (25 mg total) by mouth daily   ibuprofen (MOTRIN) 600 mg tablet   No No   Sig: Take 1 tablet (600 mg total) by mouth every 6 (six) hours as needed for mild pain   lisinopril (ZESTRIL) 10 mg tablet  Self Yes No   meloxicam (MOBIC) 15 mg tablet  Self Yes No   metoprolol tartrate (LOPRESSOR) 50 mg tablet  Self Yes No   montelukast (SINGULAIR) 10 mg tablet  Self Yes No   omeprazole (PriLOSEC) 20 mg delayed release capsule  Self Yes No   ondansetron (ZOFRAN) 4 mg tablet   No No   Sig: Take 1 tablet (4 mg total) by mouth every 6 (six) hours as needed for nausea or vomiting   predniSONE 10 mg tablet   No No   Sig: Take 5 tablets (50 mg total) by mouth daily for 5 days      Facility-Administered Medications: None       Past Medical History:   Diagnosis Date    Diverticulosis     Dry eye     Hypertension     Seasonal allergies     Thyroid nodule     Von Willebrand disease ([address] )        Past Surgical History:   Procedure Laterality Date    ARTHROSCOPIC REPAIR ACL Left     CHOLECYSTECTOMY      HYSTERECTOMY  2010    partial hysterectomy    KNEE ARTHROSCOPY Right     REDUCTION MAMMAPLASTY Bilateral 2016       Family History   Problem Relation Age of Onset    Hypertension Mother     Hyperlipidemia Mother     Diabetes type II Mother     Breast cancer Mother     Thyroid cancer Mother     Hypertension Father     Heart attack Father     Hyperlipidemia Father     Lung cancer Father     Hypertension Maternal Grandmother     Cancer Maternal Grandmother     No Known Problems Maternal Grandfather     No Known Problems Paternal Grandmother     No Known Problems Paternal Grandfather     Skin cancer Maternal Aunt     No Known Problems Paternal Aunt      I have reviewed and agree with the history as documented  E-Cigarette/Vaping    E-Cigarette Use Never User      E-Cigarette/Vaping Substances     Social History     Tobacco Use    Smoking status: Never Smoker    Smokeless tobacco: Never Used   Substance Use Topics    Alcohol use: Not Currently    Drug use: Never       Review of Systems   Constitutional: Positive for fever  HENT: Positive for sore throat  Respiratory: Positive for cough and shortness of breath  Negative for hemoptysis and wheezing  Cardiovascular: Negative for chest pain  Gastrointestinal: Negative for abdominal pain and vomiting  Musculoskeletal: Negative for neck pain  All other systems reviewed and are negative  Physical Exam  Physical Exam  Vitals signs and nursing note reviewed  Constitutional:       General: She is not in acute distress  Appearance: She is well-developed  She is ill-appearing  HENT:      Head: Normocephalic and atraumatic  Right Ear: External ear normal       Left Ear: External ear normal    Eyes:      Conjunctiva/sclera: Conjunctivae normal       Pupils: Pupils are equal, round, and reactive to light  Neck:      Musculoskeletal: Normal range of motion and neck supple  Cardiovascular:      Rate and Rhythm: Normal rate and regular rhythm  Heart sounds: Normal heart sounds  No murmur  Pulmonary:      Effort: Pulmonary effort is normal       Breath sounds: Examination of the right-lower field reveals decreased breath sounds  Examination of the left-lower field reveals decreased breath sounds  Decreased breath sounds present  No wheezing or rales  Abdominal:      General: Bowel sounds are normal  There is no distension  Palpations: Abdomen is soft  Tenderness: There is no abdominal tenderness  There is no guarding or rebound  Musculoskeletal: Normal range of motion  General: No deformity  Skin:     General: Skin is warm and dry  Findings: No rash  Neurological:      General: No focal deficit present  Mental Status: She is alert and oriented to person, place, and time  Cranial Nerves: No cranial nerve deficit     Psychiatric:         Behavior: Behavior normal          Vital Signs  ED Triage Vitals   Temperature Pulse Respirations Blood Pressure SpO2   12/29/20 0530 12/29/20 0530 12/29/20 0530 12/29/20 0530 12/29/20 0530   98 °F (36 7 °C) 98 20 126/57 94 %      Temp Source Heart Rate Source Patient Position - Orthostatic VS BP Location FiO2 (%)   12/29/20 0530 12/29/20 0530 12/29/20 0530 12/29/20 0530 --   Temporal Monitor Sitting Left arm       Pain Score       12/29/20 0659       No Pain           Vitals:    12/29/20 0659 12/29/20 0815 12/29/20 1015 12/29/20 1230   BP: 110/54 121/71  127/66   Pulse: 86 77 82 70 Patient Position - Orthostatic VS: Lying  Lying Lying         Visual Acuity      ED Medications  Medications   ascorbic acid (VITAMIN C) tablet 1,000 mg (1,000 mg Oral Given 12/29/20 0818)   cholecalciferol (VITAMIN D3) tablet 2,000 Units (2,000 Units Oral Given 12/29/20 0818)   zinc sulfate (ZINCATE) capsule 220 mg (220 mg Oral Given 12/29/20 0818)   montelukast (SINGULAIR) tablet 10 mg (has no administration in time range)   fluticasone (FLONASE) 50 mcg/act nasal spray 1 spray (1 spray Each Nare Given 12/29/20 0818)   doxycycline hyclate (VIBRAMYCIN) capsule 100 mg (100 mg Oral Given 12/29/20 0822)   benzonatate (TESSALON PERLES) capsule 100 mg ( Oral Canceled Entry 12/29/20 1515)   albuterol (PROVENTIL HFA,VENTOLIN HFA) inhaler 2 puff (has no administration in time range)   amLODIPine (NORVASC) tablet 10 mg (10 mg Oral Given 12/29/20 1404)   lisinopril (ZESTRIL) tablet 10 mg (10 mg Oral Given 12/29/20 1403)   metoprolol tartrate (LOPRESSOR) tablet 50 mg (50 mg Oral Given 12/29/20 1403)   acetaminophen (TYLENOL) tablet 650 mg (has no administration in time range)   ondansetron (ZOFRAN) injection 4 mg (has no administration in time range)   insulin lispro (HumaLOG) 100 units/mL subcutaneous injection 2-12 Units (has no administration in time range)   enoxaparin (LOVENOX) subcutaneous injection 40 mg (40 mg Subcutaneous Given 12/29/20 1404)   remdesivir (Veklury) 200 mg in sodium chloride 0 9 % 250 mL IVPB (200 mg Intravenous New Bag 12/29/20 0840)     Followed by   remdesivir Daniel Deshpande) 100 mg in sodium chloride 0 9 % 250 mL IVPB (has no administration in time range)   cefTRIAXone (ROCEPHIN) IVPB (premix in dextrose) 1,000 mg 50 mL (has no administration in time range)   hydrocodone-chlorpheniramine polistirex (TUSSIONEX) ER suspension 5 mL (has no administration in time range)   sodium chloride 0 9 % bolus 1,000 mL (0 mL Intravenous Stopped 12/29/20 0815)   dexamethasone (PF) (DECADRON) injection 10 mg (10 mg Intravenous Given 12/29/20 0603)   ketorolac (TORADOL) injection 30 mg (30 mg Intravenous Given 12/29/20 0603)   ondansetron (ZOFRAN) injection 4 mg (4 mg Intravenous Given 12/29/20 0601)   famotidine (PEPCID) injection 20 mg (20 mg Intravenous Given 12/29/20 0602)   acetaminophen (TYLENOL) tablet 650 mg (650 mg Oral Given 12/29/20 0559)   doxycycline hyclate (VIBRAMYCIN) capsule 100 mg (100 mg Oral Given 12/29/20 0559)   cefTRIAXone (ROCEPHIN) IVPB (premix in dextrose) 1,000 mg 50 mL (0 mg Intravenous Stopped 12/29/20 0632)   potassium chloride (K-DUR,KLOR-CON) CR tablet 20 mEq (20 mEq Oral Given 12/29/20 0704)   potassium chloride (K-DUR,KLOR-CON) CR tablet 20 mEq (20 mEq Oral Given 12/29/20 0817)       Diagnostic Studies  Results Reviewed     Procedure Component Value Units Date/Time    Procalcitonin Reflex [[id number]]  (Normal) Collected: 12/29/20 1244    Lab Status: Final result Specimen: Blood from Arm, Right Updated: 12/29/20 1812     Procalcitonin 0 15 ng/ml     Hemoglobin A1c w/EAG Estimation (Orders if not completed within the last 90 days) [[id number]]  (Abnormal) Collected: 12/29/20 0557    Lab Status: Final result Specimen: Blood from Arm, Right Updated: 12/29/20 1636     Hemoglobin A1C 10 2 %       mg/dl     Ferritin [[id number]]  (Abnormal) Collected: 12/29/20 1407    Lab Status: Final result Specimen: Blood from Arm, Right Updated: 12/29/20 1634     Ferritin 548 ng/mL     Blood culture #1 [[id number]] Collected: 12/29/20 0557    Lab Status: Preliminary result Specimen: Blood from Arm, Left Updated: 12/29/20 1201     Blood Culture Received in Microbiology Lab  Culture in Progress  Blood culture #2 [[id number]] Collected: 12/29/20 0557    Lab Status: Preliminary result Specimen: Blood from Arm, Right Updated: 12/29/20 1201     Blood Culture Received in Microbiology Lab  Culture in Progress      Procalcitonin with AM Reflex [[id number]]  (Normal) Collected: 12/29/20 0557    Lab Status: Final result Specimen: Blood from Arm, Right Updated: 12/29/20 1112     Procalcitonin 0 18 ng/ml     Lactic acid 2 Hours [[id number]]  (Normal) Collected: 12/29/20 1042    Lab Status: Final result Specimen: Blood from Arm, Right Updated: 12/29/20 1105     LACTIC ACID 2 0 mmol/L     Narrative:      Result may be elevated if tourniquet was used during collection  NT-BNP PRO [[id number]]  (Abnormal) Collected: 12/29/20 0557    Lab Status: Final result Specimen: Blood from Arm, Right Updated: 12/29/20 0657     NT-proBNP 147 pg/mL     Lactic acid [[id number]]  (Abnormal) Collected: 12/29/20 0557    Lab Status: Final result Specimen: Blood from Arm, Right Updated: 12/29/20 5939     LACTIC ACID 2 1 mmol/L     Narrative:      Result may be elevated if tourniquet was used during collection      Comprehensive metabolic panel [[id number]]  (Abnormal) Collected: 12/29/20 0557    Lab Status: Final result Specimen: Blood from Arm, Right Updated: 12/29/20 0634     Sodium 130 mmol/L      Potassium 3 0 mmol/L      Chloride 93 mmol/L      CO2 26 mmol/L      ANION GAP 11 mmol/L      BUN 12 mg/dL      Creatinine 1 00 mg/dL      Glucose 236 mg/dL      Calcium 7 9 mg/dL      Corrected Calcium 9 1 mg/dL      AST 45 U/L      ALT 30 U/L      Alkaline Phosphatase 59 U/L      Total Protein 6 4 g/dL      Albumin 2 5 g/dL      Total Bilirubin 0 61 mg/dL      eGFR 66 ml/min/1 73sq m     Narrative:      [facility] guidelines for Chronic Kidney Disease (CKD):     Stage 1 with normal or high GFR (GFR > 90 mL/min/1 73 square meters)    Stage 2 Mild CKD (GFR = 60-89 mL/min/1 73 square meters)    Stage 3A Moderate CKD (GFR = 45-59 mL/min/1 73 square meters)    Stage 3B Moderate CKD (GFR = 30-44 mL/min/1 73 square meters)    Stage 4 Severe CKD (GFR = 15-29 mL/min/1 73 square meters)    Stage 5 End Stage CKD (GFR <15 mL/min/1 73 square meters)  Note: GFR calculation is accurate only with a steady state creatinine    Troponin I [[id number]] (Normal) Collected: 12/29/20 0557    Lab Status: Final result Specimen: Blood from Arm, Right Updated: 12/29/20 0634     Troponin I <0 02 ng/mL     D-dimer, quantitative [[id number]]  (Abnormal) Collected: 12/29/20 0557    Lab Status: Final result Specimen: Blood from Arm, Right Updated: 12/29/20 0631     D-Dimer, Quant 1 17 ug/ml FEU     Protime-INR [[id number]]  (Normal) Collected: 12/29/20 0557    Lab Status: Final result Specimen: Blood from Arm, Right Updated: 12/29/20 0627     Protime 13 1 seconds      INR 1 01    APTT [[id number]]  (Normal) Collected: 12/29/20 0557    Lab Status: Final result Specimen: Blood from Arm, Right Updated: 12/29/20 0627     PTT 29 seconds     CBC and differential [[id number]]  (Abnormal) Collected: 12/29/20 0557    Lab Status: Final result Specimen: Blood from Arm, Right Updated: 12/29/20 0615     WBC 7 10 Thousand/uL      RBC 4 17 Million/uL      Hemoglobin 12 0 g/dL      Hematocrit 35 1 %      MCV 84 fL      MCH 28 8 pg      MCHC 34 2 g/dL      RDW 12 2 %      MPV 11 6 fL      Platelets 571 Thousands/uL      nRBC 0 /100 WBCs      Neutrophils Relative 83 %      Immat GRANS % 1 %      Lymphocytes Relative 12 %      Monocytes Relative 4 %      Eosinophils Relative 0 %      Basophils Relative 0 %      Neutrophils Absolute 5 88 Thousands/µL      Immature Grans Absolute 0 06 Thousand/uL      Lymphocytes Absolute 0 85 Thousands/µL      Monocytes Absolute 0 30 Thousand/µL      Eosinophils Absolute 0 00 Thousand/µL      Basophils Absolute 0 01 Thousands/µL                  XR chest portable   ED Interpretation by Manuel Bennett DO (12/29 6961)   Infiltrate      Final Result by Johanna Martell MD (12/29 5391)      Slight worsening of bilateral bilateral groundglass opacity due to Covid 19 pneumonia  This corresponds with the preliminary interpretation by the emergency department clinician                    Workstation performed: [id number]                    Procedures  Procedures         ED Course                             SBIRT 20yo+      Most Recent Value   SBIRT (24 yo +)   In order to provide better care to our patients, we are screening all of our patients for alcohol and drug use  Would it be okay to ask you these screening questions? Yes Filed at: 12/29/2020 0544   Initial Alcohol Screen: US AUDIT-C    1  How often do you have a drink containing alcohol?  0 Filed at: 12/29/2020 0544   2  How many drinks containing alcohol do you have on a typical day you are drinking? 0 Filed at: 12/29/2020 0544   3b  FEMALE Any Age, or MALE 65+: How often do you have 4 or more drinks on one occassion? 0 Filed at: 12/29/2020 0544   Audit-C Score  0 Filed at: 12/29/2020 0588   CRISTY: How many times in the past year have you    Used an illegal drug or used a prescription medication for non-medical reasons?   Never Filed at: 12/29/2020 0544                    MDM  Number of Diagnoses or Management Options  Hypoxia:   Pneumonia due to COVID-19 virus:      Amount and/or Complexity of Data Reviewed  Clinical lab tests: ordered and reviewed  Tests in the radiology section of CPT®: ordered and reviewed  Tests in the medicine section of CPT®: reviewed and ordered  Independent visualization of images, tracings, or specimens: yes    Risk of Complications, Morbidity, and/or Mortality  Presenting problems: moderate  Diagnostic procedures: moderate  Management options: moderate    Patient Progress  Patient progress: improved      Disposition  Final diagnoses:   Hypoxia   Pneumonia due to COVID-19 virus     Time reflects when diagnosis was documented in both MDM as applicable and the Disposition within this note     Time User Action Codes Description Comment    12/29/2020  6:45 AM Maryann Harman Add [R09 02] Hypoxia     12/29/2020  6:45 AM Maryann Harman Add [U07 1,  J12 89] Pneumonia due to COVID-19 virus       ED Disposition     ED Disposition Condition Date/Time Comment    Admit Stable Tue Dec 29, 2020  6:45 AM Case was discussed with Bharat Cuenca and the patient's admission status was agreed to be Admission Status: inpatient status to the service of Dr Paris Chavez          Follow-up Information    None         Current Discharge Medication List      CONTINUE these medications which have NOT CHANGED    Details   albuterol (PROVENTIL HFA,VENTOLIN HFA) 90 mcg/act inhaler Inhale 2 puffs every 4 (four) hours as needed for wheezing  Qty: 1 Inhaler, Refills: 0    Comments: Substitution to a formulary equivalent within the same pharmaceutical class is authorized  Associated Diagnoses: Pneumonia due to COVID-19 virus      amLODIPine (NORVASC) 10 mg tablet Refills: 2      benzonatate (TESSALON PERLES) 100 mg capsule Take 1 capsule (100 mg total) by mouth every 8 (eight) hours  Qty: 21 capsule, Refills: 0    Associated Diagnoses: Pneumonia due to COVID-19 virus      cycloSPORINE (RESTASIS) 0 05 % ophthalmic emulsion 1 drop 2 (two) times a day      doxycycline hyclate (VIBRAMYCIN) 100 mg capsule Take 1 capsule (100 mg total) by mouth every 12 (twelve) hours for 7 days  Qty: 14 capsule, Refills: 0    Associated Diagnoses: Pneumonia due to COVID-19 virus      fluticasone (FLONASE) 50 mcg/act nasal spray Refills: 4      hydrochlorothiazide (HYDRODIURIL) 25 mg tablet Take 1 tablet (25 mg total) by mouth daily  Qty: 90 tablet, Refills: 3    Associated Diagnoses: Hypertension, unspecified type      ibuprofen (MOTRIN) 600 mg tablet Take 1 tablet (600 mg total) by mouth every 6 (six) hours as needed for mild pain  Qty: 30 tablet, Refills: 0    Associated Diagnoses: Injury of right hand, initial encounter; Injury of right wrist, initial encounter;  Laceration without foreign body of unspecified finger without damage to nail, initial encounter      lisinopril (ZESTRIL) 10 mg tablet Refills: 4      meloxicam (MOBIC) 15 mg tablet Refills: 4      metoprolol tartrate (LOPRESSOR) 50 mg tablet Refills: 2      montelukast (SINGULAIR) 10 mg tablet Refills: 2 omeprazole (PriLOSEC) 20 mg delayed release capsule Refills: 4      ondansetron (ZOFRAN) 4 mg tablet Take 1 tablet (4 mg total) by mouth every 6 (six) hours as needed for nausea or vomiting  Qty: 10 tablet, Refills: 0    Associated Diagnoses: Pneumonia due to COVID-19 virus      predniSONE 10 mg tablet Take 5 tablets (50 mg total) by mouth daily for 5 days  Qty: 25 tablet, Refills: 0    Associated Diagnoses: Pneumonia due to COVID-19 virus           No discharge procedures on file      PDMP Review     None          ED Provider  Electronically Signed by           Edmund Moreira DO  12/29/20 7889

## 2020-12-30 NOTE — CASE MANAGEMENT
Chart reviewed aware of medical management  Clinical information supporting hospitalization:   + COVID   On 3 liters of 02  IV Decadron, rocephin and remdesivir  Barriers to discharge:  - medical management  Discharge plan:  - home  - will follow for 02 needs/eliquis     CM will continue to follow plan of care

## 2020-12-30 NOTE — ASSESSMENT & PLAN NOTE
· BP reviewed and acceptable  · Discontinue hydrochlorothiazide in terms of med electrolyte abnormality but continue lisinopril and metoprolol    · Monitor blood pressure

## 2020-12-30 NOTE — RESPIRATORY THERAPY NOTE
RT Protocol Note  Amado Cheung 53 y o  female MRN: [id number]  Unit/Bed#: -01 Encounter: [id number]    Assessment    Principal Problem:    Pneumonia due to COVID-19 virus  Active Problems:    Essential hypertension    Hyponatremia    Hypokalemia    Class 3 severe obesity due to excess calories with serious comorbidity and body mass index (BMI) of 40 0 to 44 9 in adult [facility])    Non-insulin dependent type 2 diabetes mellitus ([place] [address] )      Home Pulmonary Medications:  Albuterol MDI prn       Past Medical History:   Diagnosis Date    Diverticulosis     Dry eye     Hypertension     Seasonal allergies     Thyroid nodule     Von Willebrand disease ([place] [address] )      Social History     Socioeconomic History    Marital status: Single     Spouse name: None    Number of children: None    Years of education: None    Highest education level: None   Occupational History    None   Social Needs    Financial resource strain: None    Food insecurity     Worry: None     Inability: None    Transportation needs     Medical: None     Non-medical: None   Tobacco Use    Smoking status: Never Smoker    Smokeless tobacco: Never Used   Substance and Sexual Activity    Alcohol use: Not Currently    Drug use: Never    Sexual activity: None   Lifestyle    Physical activity     Days per week: None     Minutes per session: None    Stress: None   Relationships    Social connections     Talks on phone: None     Gets together: None     Attends Denominational service: None     Active member of club or organization: None     Attends meetings of clubs or organizations: None     Relationship status: None    Intimate partner violence     Fear of current or ex partner: None     Emotionally abused: None     Physically abused: None     Forced sexual activity: None   Other Topics Concern    None   Social History Narrative    None       Subjective         Objective    Physical Exam:   Assessment Type: Assess only  General Appearance: Alert, Awake  Respiratory Pattern: Dyspnea with exertion, Normal  Chest Assessment: Chest expansion symmetrical, Trachea midline  Bilateral Breath Sounds: Diminished  O2 Device: 1L NC    Vitals:  Blood pressure 124/67, pulse 82, temperature (!) 97 °F (36 1 °C), temperature source Oral, resp  rate 20, height 5' 8" (1 727 m), weight 126 kg (277 lb 12 5 oz), SpO2 91 %  Imaging and other studies: I have personally reviewed pertinent reports  O2 Device: 1L NC     Plan    Respiratory Plan: Mild Distress pathway, No distress/Pulmonary history        Resp Comments: Pt admitted for COVID PNA  Stable on 1L NC  Pt denies home O2 and CPAP  BS dim  SpO2 91%

## 2020-12-30 NOTE — ASSESSMENT & PLAN NOTE
· Diagnosed with pneumonia 12/25/2020  · Patient is a 5 L of oxygen  Her CRP is elevated  I also discussed with her the convalescent plasma on the indications the UA and also the side effects patient is in agreement consent is signed  · Convalescent Plasma was ordered on 12/30/2020  The Fact Sheet for Patients and Parents/Caregivers was provided to the patient and/or healthcare agent  The option to accept or refuse administration was offered  The risks, benefits, and alternatives to treatment were reviewed, and all questions addressed  Disclosure that this treatment is authorized for use under EUA and not FDA approved for treatment was discussed  · Chest x-ray 12/27/2020: Patchy bilateral peripheral groundglass opacity compatible with Covid 19 pneumonia  · CTA chest 12/27/2020: No evidence of pulmonary artery embolus  Diffuse airspace opacities throughout the lungs which may represent infection such as COVID pneumonia  Short-term follow-up chest CT scan in 3 months is recommended to evaluate for resolution  Mediastinal lymphadenopathy  Follow-up is recommended  · Chest x-ray 12/29/2020: Slight worsening of bilateral bilateral groundglass opacity due to Covid 19 pneumonia  This corresponds with the preliminary interpretation by the emergency department clinician     · Troponin: <0 02, , check CT  · Check CRP ferritin, D-dimer 1 17  · Continue Decadron  ·   · Pepcid  · Procalcitonin x2 is negative discontinue antibiotics  · Oxygen protocol  · Respiratory protocol  · Enoxaparin for anticoagulation  · Remdesivir

## 2020-12-30 NOTE — PROGRESS NOTES
Progress Note Arsalan Marshall 1970, 53 y o  female MRN: [id number]    Unit/Bed#: -01 Encounter: [id number]    Primary Care Provider: Yaron Barrera DO   Date and time admitted to hospital: 12/29/2020  5:27 AM        * Acute respiratory failure with hypoxia [facility])  Assessment & Plan  · 2/2 covid 19 pneumonia    Non-insulin dependent type 2 diabetes mellitus [facility])  Assessment & Plan  Lab Results   Component Value Date    HGBA1C 10 2 (H) 12/29/2020       Recent Labs     12/29/20  2106 12/30/20  0725 12/30/20  1136   POCGLU 316* 236* 231*       Blood Sugar Average: Last 72 hrs:  (P) 261  · Glucose 285  · Has been on oral steroids as an outpatient  · Uncontrolled with hemoglobin A1c 6 of 10 6 and on steroids with hyperglycemia placed on NPH 20 units b i d  And also sliding scale has been changed  Class 3 severe obesity due to excess calories with serious comorbidity and body mass index (BMI) of 40 0 to 44 9 in adult [facility])  Assessment & Plan  · Encourage intensive therapeutic lifestyle modification    Pneumonia due to COVID-19 virus  Assessment & Plan  · Diagnosed with pneumonia 12/25/2020  · Patient is a 5 L of oxygen  Her CRP is elevated  I also discussed with her the convalescent plasma on the indications the UA and also the side effects patient is in agreement consent is signed  · Convalescent Plasma was ordered on 12/30/2020  The Fact Sheet for Patients and Parents/Caregivers was provided to the patient and/or healthcare agent  The option to accept or refuse administration was offered  The risks, benefits, and alternatives to treatment were reviewed, and all questions addressed  Disclosure that this treatment is authorized for use under EUA and not FDA approved for treatment was discussed  · Chest x-ray 12/27/2020: Patchy bilateral peripheral groundglass opacity compatible with Covid 19 pneumonia  · CTA chest 12/27/2020: No evidence of pulmonary artery embolus   Diffuse airspace opacities throughout the lungs which may represent infection such as COVID pneumonia  Short-term follow-up chest CT scan in 3 months is recommended to evaluate for resolution  Mediastinal lymphadenopathy  Follow-up is recommended  · Chest x-ray 12/29/2020: Slight worsening of bilateral bilateral groundglass opacity due to Covid 19 pneumonia  This corresponds with the preliminary interpretation by the emergency department clinician  · Troponin: <0 02, , check CT  · Check CRP ferritin, D-dimer 1 17  · Continue Decadron  ·   · Pepcid  · Procalcitonin x2 is negative discontinue antibiotics  · Oxygen protocol  · Respiratory protocol  · Enoxaparin for anticoagulation  · Remdesivir    Hypokalemia  Assessment & Plan  · Replace    Hyponatremia  Assessment & Plan  · Sodium 130 secondary to hyperglycemia  She is also on hydrochlorothiazide I will discontinue  · Received 1 L normal saline in the ER  · Recheck metabolic panel and trend sodium    Essential hypertension  Assessment & Plan  · BP reviewed and acceptable  · Discontinue hydrochlorothiazide in terms of med electrolyte abnormality but continue lisinopril and metoprolol  · Monitor blood pressure        VTE Pharmacologic Prophylaxis:   Pharmacologic: Enoxaparin (Lovenox)  Mechanical VTE Prophylaxis in Place: Yes    Patient Centered Rounds: I have performed bedside rounds with nursing staff today  Discussions with Specialists or Other Care Team Provider:  Discussed with nursing    Education and Discussions with Family / Patient:  Patient and I updated mother    Time Spent for Care: 30 minutes  More than 50% of total time spent on counseling and coordination of care as described above      Current Length of Stay: 1 day(s)    Current Patient Status: Inpatient   Certification Statement: The patient will continue to require additional inpatient hospital stay due to Acute hypoxic respiratory failure and COVID-19 pneumonia    Discharge Plan:  When medically cleared    Code Status: Level 1 - Full Code      Subjective:   Patient seen and examined complaining of shortness of breath did not sleep at night and cough  Objective:     Vitals:   Temp (24hrs), Av 2 °F (36 8 °C), Min:97 °F (36 1 °C), Max:98 8 °F (37 1 °C)    Temp:  [97 °F (36 1 °C)-98 8 °F (37 1 °C)] 98 2 °F (36 8 °C)  HR:  [] 88  Resp:  [20-24] 22  BP: (106-141)/(55-80) 135/80  SpO2:  [83 %-92 %] 90 %  Body mass index is 42 24 kg/m²  Input and Output Summary (last 24 hours): Intake/Output Summary (Last 24 hours) at 2020 1256  Last data filed at 2020 1001  Gross per 24 hour   Intake 490 ml   Output 401 ml   Net 89 ml       Physical Exam:     Physical Exam  Constitutional:       Appearance: She is obese  HENT:      Head: Normocephalic and atraumatic  Eyes:      Extraocular Movements: Extraocular movements intact  Pupils: Pupils are equal, round, and reactive to light  Cardiovascular:      Rate and Rhythm: Normal rate and regular rhythm  Pulmonary:      Effort: Pulmonary effort is normal       Comments: Decreased bilaterally  Abdominal:      General: Abdomen is flat  Bowel sounds are normal  There is no distension  Palpations: Abdomen is soft  Tenderness: There is no abdominal tenderness  Musculoskeletal:         General: No swelling     Psychiatric:         Mood and Affect: Mood normal          Behavior: Behavior normal            Additional Data:     Labs:    Results from last 7 days   Lab Units 20  0519   WBC Thousand/uL 8 04   HEMOGLOBIN g/dL 11 8   HEMATOCRIT % 35 6   PLATELETS Thousands/uL 177   NEUTROS PCT % 84*   LYMPHS PCT % 9*   MONOS PCT % 6   EOS PCT % 0     Results from last 7 days   Lab Units 20  0519   SODIUM mmol/L 133*   POTASSIUM mmol/L 3 3*   CHLORIDE mmol/L 97*   CO2 mmol/L 26   BUN mg/dL 14   CREATININE mg/dL 0 85   ANION GAP mmol/L 10   CALCIUM mg/dL 8 1*   ALBUMIN g/dL 2 3*   TOTAL BILIRUBIN mg/dL 0 57   ALK PHOS U/L 64 ALT U/L 25   AST U/L 26   GLUCOSE RANDOM mg/dL 260*     Results from last 7 days   Lab Units 12/29/20  0557   INR  1 01     Results from last 7 days   Lab Units 12/30/20  1136 12/30/20  0725 12/29/20  2106 12/25/20  1923   POC GLUCOSE mg/dl 231* 236* 316* 285*     Results from last 7 days   Lab Units 12/29/20  0557   HEMOGLOBIN A1C % 10 2*     Results from last 7 days   Lab Units 12/29/20  1244 12/29/20  1042 12/29/20  0557 12/27/20  0937 12/27/20  0732   LACTIC ACID mmol/L  --  2 0 2 1* 1 2 2 2*   PROCALCITONIN ng/ml 0 15  --  0 18  --   --            * I Have Reviewed All Lab Data Listed Above  * Additional Pertinent Lab Tests Reviewed: All Labs Within Last 24 Hours Reviewed    Imaging:    Imaging Reports Reviewed Today Include: none today  Imaging Personally Reviewed by Myself Includes:      Recent Cultures (last 7 days):     Results from last 7 days   Lab Units 12/29/20  0557   BLOOD CULTURE  No Growth at 24 hrs  No Growth at 24 hrs         Last 24 Hours Medication List:   Current Facility-Administered Medications   Medication Dose Route Frequency Provider Last Rate    acetaminophen  650 mg Oral Q6H PRN YUKI Sanderson      albuterol  2 puff Inhalation Q4H PRN YUKI Sanderson      amLODIPine  10 mg Oral Daily YUKI Sanderson      ascorbic acid  1,000 mg Oral Daily Carter Peace,       benzonatate  100 mg Oral Q8H Ayan Line Van[place], [address]      cholecalciferol  2,000 Units Oral Daily Jose Luis Rm,       dexamethasone  6 mg Intravenous Q24H Elva Felix MD      enoxaparin  40 mg Subcutaneous Q12H [address] YUKI Sanderson      famotidine  20 mg Oral BID Elva Felix MD      fluticasone  1 spray Each Nare Daily YUKI Sanderson      guaiFENesin  200 mg Oral Q4H PRN Elva Felix MD      hydrocodone-chlorpheniramine polistirex  5 mL Oral Q12H PRN Elva Felix MD      insulin lispro  2-12 Units Subcutaneous TID [facility] Elva Felix MD      insulin lispro  2-12 Units Subcutaneous HS Wilder Luo MD      insulin NPH  20 Units Subcutaneous BID AC Wilder Luo MD      lisinopril  10 mg Oral Daily YUKI Keith      melatonin  6 mg Oral HS Wilder Luo MD      metoprolol tartrate  50 mg Oral [facility] YUKI Keith      montelukast  10 mg Oral HS YUKI Keith      ondansetron  4 mg Intravenous Q6H PRN YUKI Keith      potassium chloride  40 mEq Oral Once Wilder Luo MD      remdesivir  100 mg Intravenous Q24H YUKI Keith Stopped (12/30/20 8162)   [employer] zinc sulfate  220 mg Oral Daily Jana Culver DO          Today, Patient Was Seen By: Wilder Luo MD    ** Please Note: Dictation voice to text software may have been used in the creation of this document   **

## 2020-12-30 NOTE — ASSESSMENT & PLAN NOTE
· Sodium 130 secondary to hyperglycemia  She is also on hydrochlorothiazide I will discontinue    · Received 1 L normal saline in the ER  · Recheck metabolic panel and trend sodium

## 2020-12-30 NOTE — PLAN OF CARE
Problem: CARDIOVASCULAR - ADULT  Goal: Maintains optimal cardiac output and hemodynamic stability  Description: INTERVENTIONS:  - Monitor I/O, vital signs and rhythm  - Monitor for S/S and trends of decreased cardiac output  - Administer and titrate ordered vasoactive medications to optimize hemodynamic stability  - Assess quality of pulses, skin color and temperature  - Assess for signs of decreased coronary artery perfusion  - Instruct patient to report change in severity of symptoms  Outcome: Progressing  Goal: Absence of cardiac dysrhythmias or at baseline rhythm  Description: INTERVENTIONS:  - Continuous cardiac monitoring, vital signs, obtain 12 lead EKG if ordered  - Administer antiarrhythmic and heart rate control medications as ordered  - Monitor electrolytes and administer replacement therapy as ordered  Outcome: Progressing     Problem: RESPIRATORY - ADULT  Goal: Achieves optimal ventilation and oxygenation  Description: INTERVENTIONS:  - Assess for changes in respiratory status  - Assess for changes in mentation and behavior  - Position to facilitate oxygenation and minimize respiratory effort  - Oxygen administered by appropriate delivery if ordered  - Initiate smoking cessation education as indicated  - Encourage broncho-pulmonary hygiene including cough, deep breathe, Incentive Spirometry  - Assess the need for suctioning and aspirate as needed  - Assess and instruct to report SOB or any respiratory difficulty  - Respiratory Therapy support as indicated  Outcome: Not Progressing

## 2020-12-30 NOTE — PROGRESS NOTES
Pt with reported decreased appetite, reports "picking" at her meals  Says she was tolerating about 2 meals per day at home, normally eats 2-3 meals  No significant wt changes noted  Not agreeable to glucerna supplementation, reports not tolerating milk-based drinks at this time  Noting elevated POC BS and A1C levels  Would like to trial prosource daily, encouraged to mix with diet gingerale for compliance  Will provide diet ed as appropriate/closer to d/c

## 2020-12-31 LAB
ABO GROUP BLD BPU: NORMAL
ALBUMIN SERPL BCP-MCNC: 2.4 G/DL (ref 3.5–5)
ALP SERPL-CCNC: 66 U/L (ref 46–116)
ALT SERPL W P-5'-P-CCNC: 25 U/L (ref 12–78)
ANION GAP SERPL CALCULATED.3IONS-SCNC: 8 MMOL/L (ref 4–13)
AST SERPL W P-5'-P-CCNC: 20 U/L (ref 5–45)
BASOPHILS # BLD AUTO: 0.01 THOUSANDS/ΜL (ref 0–0.1)
BASOPHILS NFR BLD AUTO: 0 % (ref 0–1)
BILIRUB SERPL-MCNC: 0.5 MG/DL (ref 0.2–1)
BPU ID: NORMAL
BUN SERPL-MCNC: 12 MG/DL (ref 5–25)
CALCIUM ALBUM COR SERPL-MCNC: 9.8 MG/DL (ref 8.3–10.1)
CALCIUM SERPL-MCNC: 8.5 MG/DL (ref 8.3–10.1)
CHLORIDE SERPL-SCNC: 101 MMOL/L (ref 100–108)
CO2 SERPL-SCNC: 28 MMOL/L (ref 21–32)
CREAT SERPL-MCNC: 0.88 MG/DL (ref 0.6–1.3)
CRP SERPL QL: 62.6 MG/L
D DIMER PPP FEU-MCNC: 1.13 UG/ML FEU
EOSINOPHIL # BLD AUTO: 0 THOUSAND/ΜL (ref 0–0.61)
EOSINOPHIL NFR BLD AUTO: 0 % (ref 0–6)
ERYTHROCYTE [DISTWIDTH] IN BLOOD BY AUTOMATED COUNT: 12.2 % (ref 11.6–15.1)
GFR SERPL CREATININE-BSD FRML MDRD: 77 ML/MIN/1.73SQ M
GLUCOSE SERPL-MCNC: 240 MG/DL (ref 65–140)
GLUCOSE SERPL-MCNC: 267 MG/DL (ref 65–140)
GLUCOSE SERPL-MCNC: 268 MG/DL (ref 65–140)
GLUCOSE SERPL-MCNC: 314 MG/DL (ref 65–140)
HCT VFR BLD AUTO: 37.8 % (ref 34.8–46.1)
HGB BLD-MCNC: 12.4 G/DL (ref 11.5–15.4)
IMM GRANULOCYTES # BLD AUTO: 0.07 THOUSAND/UL (ref 0–0.2)
IMM GRANULOCYTES NFR BLD AUTO: 1 % (ref 0–2)
LYMPHOCYTES # BLD AUTO: 0.85 THOUSANDS/ΜL (ref 0.6–4.47)
LYMPHOCYTES NFR BLD AUTO: 17 % (ref 14–44)
MCH RBC QN AUTO: 27.9 PG (ref 26.8–34.3)
MCHC RBC AUTO-ENTMCNC: 32.8 G/DL (ref 31.4–37.4)
MCV RBC AUTO: 85 FL (ref 82–98)
MONOCYTES # BLD AUTO: 0.34 THOUSAND/ΜL (ref 0.17–1.22)
MONOCYTES NFR BLD AUTO: 7 % (ref 4–12)
NEUTROPHILS # BLD AUTO: 3.86 THOUSANDS/ΜL (ref 1.85–7.62)
NEUTS SEG NFR BLD AUTO: 75 % (ref 43–75)
NRBC BLD AUTO-RTO: 0 /100 WBCS
PLATELET # BLD AUTO: 231 THOUSANDS/UL (ref 149–390)
PMV BLD AUTO: 11.5 FL (ref 8.9–12.7)
POTASSIUM SERPL-SCNC: 3.5 MMOL/L (ref 3.5–5.3)
PROT SERPL-MCNC: 6.7 G/DL (ref 6.4–8.2)
RBC # BLD AUTO: 4.44 MILLION/UL (ref 3.81–5.12)
SODIUM SERPL-SCNC: 137 MMOL/L (ref 136–145)
UNIT DISPENSE STATUS: NORMAL
UNIT PRODUCT CODE: NORMAL
UNIT RH: NORMAL
WBC # BLD AUTO: 5.13 THOUSAND/UL (ref 4.31–10.16)

## 2020-12-31 PROCEDURE — 85379 FIBRIN DEGRADATION QUANT: CPT | Performed by: FAMILY MEDICINE

## 2020-12-31 PROCEDURE — 82948 REAGENT STRIP/BLOOD GLUCOSE: CPT

## 2020-12-31 PROCEDURE — 86140 C-REACTIVE PROTEIN: CPT | Performed by: FAMILY MEDICINE

## 2020-12-31 PROCEDURE — 85025 COMPLETE CBC W/AUTO DIFF WBC: CPT | Performed by: FAMILY MEDICINE

## 2020-12-31 PROCEDURE — 80053 COMPREHEN METABOLIC PANEL: CPT | Performed by: FAMILY MEDICINE

## 2020-12-31 PROCEDURE — 99232 SBSQ HOSP IP/OBS MODERATE 35: CPT | Performed by: FAMILY MEDICINE

## 2020-12-31 RX ORDER — QUETIAPINE FUMARATE 25 MG/1
25 TABLET, FILM COATED ORAL ONCE
Status: COMPLETED | OUTPATIENT
Start: 2020-12-31 | End: 2020-12-31

## 2020-12-31 RX ORDER — FUROSEMIDE 10 MG/ML
40 INJECTION INTRAMUSCULAR; INTRAVENOUS ONCE
Status: COMPLETED | OUTPATIENT
Start: 2020-12-31 | End: 2020-12-31

## 2020-12-31 RX ADMIN — INSULIN HUMAN 20 UNITS: 100 INJECTION, SUSPENSION SUBCUTANEOUS at 08:00

## 2020-12-31 RX ADMIN — DEXAMETHASONE SODIUM PHOSPHATE 6 MG: 4 INJECTION, SOLUTION INTRAMUSCULAR; INTRAVENOUS at 08:40

## 2020-12-31 RX ADMIN — METOPROLOL TARTRATE 50 MG: 50 TABLET, FILM COATED ORAL at 21:32

## 2020-12-31 RX ADMIN — MONTELUKAST 10 MG: 10 TABLET, FILM COATED ORAL at 21:32

## 2020-12-31 RX ADMIN — INSULIN LISPRO 8 UNITS: 100 INJECTION, SOLUTION INTRAVENOUS; SUBCUTANEOUS at 16:36

## 2020-12-31 RX ADMIN — INSULIN LISPRO 6 UNITS: 100 INJECTION, SOLUTION INTRAVENOUS; SUBCUTANEOUS at 08:00

## 2020-12-31 RX ADMIN — LISINOPRIL 10 MG: 10 TABLET ORAL at 08:41

## 2020-12-31 RX ADMIN — AMLODIPINE BESYLATE 10 MG: 10 TABLET ORAL at 08:41

## 2020-12-31 RX ADMIN — FUROSEMIDE 40 MG: 10 INJECTION, SOLUTION INTRAMUSCULAR; INTRAVENOUS at 09:52

## 2020-12-31 RX ADMIN — INSULIN LISPRO 6 UNITS: 100 INJECTION, SOLUTION INTRAVENOUS; SUBCUTANEOUS at 21:32

## 2020-12-31 RX ADMIN — OXYCODONE HYDROCHLORIDE AND ACETAMINOPHEN 1000 MG: 500 TABLET ORAL at 08:41

## 2020-12-31 RX ADMIN — ENOXAPARIN SODIUM 40 MG: 40 INJECTION SUBCUTANEOUS at 21:32

## 2020-12-31 RX ADMIN — FAMOTIDINE 20 MG: 20 TABLET, FILM COATED ORAL at 18:11

## 2020-12-31 RX ADMIN — HYDROCODONE POLISTIREX AND CHLORPHENIRAMINE POLISTIREX 5 ML: 10; 8 SUSPENSION, EXTENDED RELEASE ORAL at 09:55

## 2020-12-31 RX ADMIN — ZINC SULFATE 220 MG (50 MG) CAPSULE 220 MG: CAPSULE at 08:41

## 2020-12-31 RX ADMIN — GUAIFENESIN 200 MG: 100 SOLUTION ORAL at 12:56

## 2020-12-31 RX ADMIN — ENOXAPARIN SODIUM 40 MG: 40 INJECTION SUBCUTANEOUS at 08:40

## 2020-12-31 RX ADMIN — FLUTICASONE PROPIONATE 1 SPRAY: 50 SPRAY, METERED NASAL at 08:41

## 2020-12-31 RX ADMIN — REMDESIVIR 100 MG: 100 INJECTION, POWDER, LYOPHILIZED, FOR SOLUTION INTRAVENOUS at 08:39

## 2020-12-31 RX ADMIN — QUETIAPINE FUMARATE 25 MG: 25 TABLET ORAL at 00:28

## 2020-12-31 RX ADMIN — INSULIN LISPRO 5 UNITS: 100 INJECTION, SOLUTION INTRAVENOUS; SUBCUTANEOUS at 16:38

## 2020-12-31 RX ADMIN — METOPROLOL TARTRATE 50 MG: 50 TABLET, FILM COATED ORAL at 08:41

## 2020-12-31 RX ADMIN — BENZONATATE 100 MG: 100 CAPSULE ORAL at 22:50

## 2020-12-31 RX ADMIN — INSULIN LISPRO 5 UNITS: 100 INJECTION, SOLUTION INTRAVENOUS; SUBCUTANEOUS at 12:53

## 2020-12-31 RX ADMIN — GUAIFENESIN 200 MG: 100 SOLUTION ORAL at 05:35

## 2020-12-31 RX ADMIN — HYDROCODONE POLISTIREX AND CHLORPHENIRAMINE POLISTIREX 5 ML: 10; 8 SUSPENSION, EXTENDED RELEASE ORAL at 22:51

## 2020-12-31 RX ADMIN — INSULIN LISPRO 10 UNITS: 100 INJECTION, SOLUTION INTRAVENOUS; SUBCUTANEOUS at 12:53

## 2020-12-31 RX ADMIN — GUAIFENESIN 200 MG: 100 SOLUTION ORAL at 19:33

## 2020-12-31 RX ADMIN — Medication 2000 UNITS: at 08:40

## 2020-12-31 RX ADMIN — BENZONATATE 100 MG: 100 CAPSULE ORAL at 16:35

## 2020-12-31 RX ADMIN — BENZONATATE 100 MG: 100 CAPSULE ORAL at 08:41

## 2020-12-31 RX ADMIN — INSULIN HUMAN 20 UNITS: 100 INJECTION, SUSPENSION SUBCUTANEOUS at 16:39

## 2020-12-31 RX ADMIN — FAMOTIDINE 20 MG: 20 TABLET, FILM COATED ORAL at 08:40

## 2020-12-31 RX ADMIN — MELATONIN TAB 3 MG 6 MG: 3 TAB at 21:32

## 2020-12-31 NOTE — CASE MANAGEMENT
Chart reviewed aware of medical management  Case was discussed in multidisciplinary discharge meeting  Clinical information supporting hospitalization:   + COVID  Currently on 5 liters of 02  - IV remdesivir/ decadron  Barriers to discharge:  - medical management  Discharge plan:  Home  - possible home 02  - Patient may need assistance with Eliquis     CM will continue to follow plan of care

## 2020-12-31 NOTE — ASSESSMENT & PLAN NOTE
Lab Results   Component Value Date    HGBA1C 10 2 (H) 12/29/2020       Recent Labs     12/30/20  1136 12/30/20  1651 12/30/20  2206 12/31/20  0757   POCGLU 231* 295* 292* 267*       Blood Sugar Average: Last 72 hrs:  (P) 492 [id number]  · Glucose 285  · Has been on oral steroids as an outpatient  · Uncontrolled with hemoglobin A1c 6 of 10 6 and on steroids with hyperglycemia placed on NPH 20 units b i d , start humolog 5 units tid

## 2020-12-31 NOTE — PLAN OF CARE
Problem: CARDIOVASCULAR - ADULT  Goal: Maintains optimal cardiac output and hemodynamic stability  Description: INTERVENTIONS:  - Monitor I/O, vital signs and rhythm  - Monitor for S/S and trends of decreased cardiac output  - Administer and titrate ordered vasoactive medications to optimize hemodynamic stability  - Assess quality of pulses, skin color and temperature  - Assess for signs of decreased coronary artery perfusion  - Instruct patient to report change in severity of symptoms  Outcome: Progressing  Goal: Absence of cardiac dysrhythmias or at baseline rhythm  Description: INTERVENTIONS:  - Continuous cardiac monitoring, vital signs, obtain 12 lead EKG if ordered  - Administer antiarrhythmic and heart rate control medications as ordered  - Monitor electrolytes and administer replacement therapy as ordered  Outcome: Progressing     Problem: RESPIRATORY - ADULT  Goal: Achieves optimal ventilation and oxygenation  Description: INTERVENTIONS:  - Assess for changes in respiratory status  - Assess for changes in mentation and behavior  - Position to facilitate oxygenation and minimize respiratory effort  - Oxygen administered by appropriate delivery if ordered  - Initiate smoking cessation education as indicated  - Encourage broncho-pulmonary hygiene including cough, deep breathe, Incentive Spirometry  - Assess the need for suctioning and aspirate as needed  - Assess and instruct to report SOB or any respiratory difficulty  - Respiratory Therapy support as indicated  Outcome: Progressing

## 2020-12-31 NOTE — PROGRESS NOTES
Progress Note Kelvin Shields 1970, 53 y o  female MRN: [id number]    Unit/Bed#: -01 Encounter: [id number]    Primary Care Provider: Jannette Miller DO   Date and time admitted to hospital: 12/29/2020  5:27 AM        * Acute respiratory failure with hypoxia ([address] )  Assessment & Plan  · 2/2 covid 19 pneumonia still at 5 liters see below management     Non-insulin dependent type 2 diabetes mellitus [facility])  Assessment & Plan  Lab Results   Component Value Date    HGBA1C 10 2 (H) 12/29/2020       Recent Labs     12/30/20  1136 12/30/20  1651 12/30/20  2206 12/31/20  0757   POCGLU 231* 295* 292* 267*       Blood Sugar Average: Last 72 hrs:  (P) 859 [id number]  · Glucose 285  · Has been on oral steroids as an outpatient  · Uncontrolled with hemoglobin A1c 6 of 10 6 and on steroids with hyperglycemia placed on NPH 20 units b i d , start humolog 5 units tid     Class 3 severe obesity due to excess calories with serious comorbidity and body mass index (BMI) of 40 0 to 44 9 in adult [facility])  Assessment & Plan  · Encourage intensive therapeutic lifestyle modification    Pneumonia due to COVID-19 virus  Assessment & Plan  · Diagnosed with pneumonia 12/25/2020  · Patient is a 5 L of oxygen  Her CRP is elevated  I also discussed with her the convalescent plasma on the indications the UA and also the side effects patient is in agreement consent is signed  · Convalescent Plasma was ordered on 12/31/2020  The Fact Sheet for Patients and Parents/Caregivers was provided to the patient and/or healthcare agent  The option to accept or refuse administration was offered  The risks, benefits, and alternatives to treatment were reviewed, and all questions addressed  Disclosure that this treatment is authorized for use under EUA and not FDA approved for treatment was discussed  · Chest x-ray 12/27/2020: Patchy bilateral peripheral groundglass opacity compatible with Covid 19 pneumonia  · CTA chest 12/27/2020:  No evidence of pulmonary artery embolus  Diffuse airspace opacities throughout the lungs which may represent infection such as COVID pneumonia  Short-term follow-up chest CT scan in 3 months is recommended to evaluate for resolution  Mediastinal lymphadenopathy  Follow-up is recommended  · Chest x-ray 12/29/2020: Slight worsening of bilateral bilateral groundglass opacity due to Covid 19 pneumonia  This corresponds with the preliminary interpretation by the emergency department clinician  · Troponin: <0 02, , check CT  · Inflammatory markers slowly coming down  But patient is still short of breath she is still coughing  Still on 5 L  Will give her a dose of Lasix 40 mg IV x1 will recheck inflammatory markers tomorrow as well  · Continue Decadron  ·   · Pepcid  · Procalcitonin x2 is negative discontinue antibiotics  · Oxygen protocol  · Respiratory protocol  · Enoxaparin for anticoagulation  · Remdesivir    Hypokalemia  Assessment & Plan  · Resolved     Hyponatremia  Assessment & Plan  · resolved    Essential hypertension  Assessment & Plan  · BP reviewed and acceptable  · Discontinue hydrochlorothiazide in terms of med electrolyte abnormality but continue lisinopril and metoprolol  · Monitor blood pressure      VTE Pharmacologic Prophylaxis:   Pharmacologic: Enoxaparin (Lovenox)  Mechanical VTE Prophylaxis in Place: Yes    Patient Centered Rounds: I have performed bedside rounds with nursing staff today  Discussions with Specialists or Other Care Team Provider:  Case management    Education and Discussions with Family / Patient:  Patient and I updated mom    Time Spent for Care: 30 minutes  More than 50% of total time spent on counseling and coordination of care as described above      Current Length of Stay: 2 day(s)    Current Patient Status: Inpatient   Certification Statement: The patient will continue to require additional inpatient hospital stay due to Acute hypoxic respiratory failure with COVID-19 pneumonia    Discharge Plan:  Not ready yet    Code Status: Level 1 - Full Code      Subjective:   Patient seen and examined still sob and cough  Cp when cough  eating    Objective:     Vitals:   Temp (24hrs), Av 2 °F (36 2 °C), Min:96 1 °F (35 6 °C), Max:98 °F (36 7 °C)    Temp:  [96 1 °F (35 6 °C)-98 °F (36 7 °C)] 96 1 °F (35 6 °C)  HR:  [65-80] 65  Resp:  [18-24] 18  BP: (106-139)/(55-78) 133/65  SpO2:  [83 %-98 %] 98 %  Body mass index is 42 24 kg/m²  Input and Output Summary (last 24 hours): Intake/Output Summary (Last 24 hours) at 2020 1245  Last data filed at 2020 0900  Gross per 24 hour   Intake 930 ml   Output 2 ml   Net 928 ml       Physical Exam:     Physical Exam  HENT:      Head: Normocephalic and atraumatic  Eyes:      Extraocular Movements: Extraocular movements intact  Pupils: Pupils are equal, round, and reactive to light  Cardiovascular:      Rate and Rhythm: Normal rate and regular rhythm  Pulmonary:      Effort: Pulmonary effort is normal  No respiratory distress  Breath sounds: No wheezing or rales  Comments: decreased  Abdominal:      General: Abdomen is flat  Bowel sounds are normal  There is no distension  Palpations: Abdomen is soft  Tenderness: There is no abdominal tenderness  Musculoskeletal:         General: No swelling  Neurological:      General: No focal deficit present  Mental Status: She is alert and oriented to person, place, and time             Additional Data:     Labs:    Results from last 7 days   Lab Units 20  0530   WBC Thousand/uL 5 13   HEMOGLOBIN g/dL 12 4   HEMATOCRIT % 37 8   PLATELETS Thousands/uL 231   NEUTROS PCT % 75   LYMPHS PCT % 17   MONOS PCT % 7   EOS PCT % 0     Results from last 7 days   Lab Units 20  0530   SODIUM mmol/L 137   POTASSIUM mmol/L 3 5   CHLORIDE mmol/L 101   CO2 mmol/L 28   BUN mg/dL 12   CREATININE mg/dL 0 88   ANION GAP mmol/L 8   CALCIUM mg/dL 8 5   ALBUMIN g/dL 2 4*   TOTAL BILIRUBIN mg/dL 0 50   ALK PHOS U/L 66   ALT U/L 25   AST U/L 20   GLUCOSE RANDOM mg/dL 240*     Results from last 7 days   Lab Units 12/29/20  0557   INR  1 01     Results from last 7 days   Lab Units 12/31/20  0757 12/30/20  2206 12/30/20  1651 12/30/20  1136 12/30/20  0725 12/29/20  2106 12/25/20  1923   POC GLUCOSE mg/dl 267* 292* 295* 231* 236* 316* 285*     Results from last 7 days   Lab Units 12/29/20  0557   HEMOGLOBIN A1C % 10 2*     Results from last 7 days   Lab Units 12/29/20  1244 12/29/20  1042 12/29/20  0557 12/27/20  0937 12/27/20  0732   LACTIC ACID mmol/L  --  2 0 2 1* 1 2 2 2*   PROCALCITONIN ng/ml 0 15  --  0 18  --   --            * I Have Reviewed All Lab Data Listed Above  * Additional Pertinent Lab Tests Reviewed: All Labs Within Last 24 Hours Reviewed    Imaging:    Imaging Reports Reviewed Today Include: none today  Imaging Personally Reviewed by Myself Includes:      Recent Cultures (last 7 days):     Results from last 7 days   Lab Units 12/29/20  0557   BLOOD CULTURE  No Growth at 48 hrs  No Growth at 48 hrs         Last 24 Hours Medication List:   Current Facility-Administered Medications   Medication Dose Route Frequency Provider Last Rate    acetaminophen  650 mg Oral Q6H PRN YUKI Monroe      albuterol  2 puff Inhalation Q4H PRN YUKI Monroe      amLODIPine  10 mg Oral Daily YUKI Monroe      ascorbic acid  1,000 mg Oral Daily Jessa Peace DO      benzonatate  100 mg Oral Q8H Erlene Santana [place], [address]      cholecalciferol  2,000 Units Oral Daily Daniele Kirkland DO      dexamethasone  6 mg Intravenous Q24H Chaparro Ghotra MD      enoxaparin  40 mg Subcutaneous Q12H [address] YUKI Monroe      famotidine  20 mg Oral BID Chaparro Ghotra MD      fluticasone  1 spray Each Nare Daily YUKI Monroe      guaiFENesin  200 mg Oral Q4H PRN Chaparro Ghotra MD      hydrocodone-chlorpheniramine polistirex  5 mL Oral Q12H PRN [employer] Kristin Simeon Duran MD      insulin lispro  2-12 Units Subcutaneous TID [facility] Lamberto Rahman MD      insulin lispro  2-12 Units Subcutaneous HS Lamberto Rahman MD      insulin lispro  5 Units Subcutaneous TID [employer] Lamberto Rahman MD      insulin NPH  20 Units Subcutaneous BID AC Lamberto Rahman MD      lisinopril  10 mg Oral Daily Antonella Massy, CRNP      melatonin  6 mg Oral HS Lamberto Rahman MD      metoprolol tartrate  50 mg Oral Q12H [address] Antonella Massy, CRNP      montelukast  10 mg Oral HS Antonella Massy, CRNP      ondansetron  4 mg Intravenous Q6H PRN Antonella Massy, CRNP      remdesivir  100 mg Intravenous Q24H Antonella Massy, CRNP 0 mg (12/30/20 6458)    zinc sulfate  220 mg Oral Daily Guadalupe Homans, DO          Today, Patient Was Seen By: Lamberto Rahman MD    ** Please Note: Dictation voice to text software may have been used in the creation of this document   **

## 2020-12-31 NOTE — ASSESSMENT & PLAN NOTE
· Diagnosed with pneumonia 12/25/2020  · Patient is a 5 L of oxygen  Her CRP is elevated  I also discussed with her the convalescent plasma on the indications the UA and also the side effects patient is in agreement consent is signed  · Convalescent Plasma was ordered on 12/31/2020  The Fact Sheet for Patients and Parents/Caregivers was provided to the patient and/or healthcare agent  The option to accept or refuse administration was offered  The risks, benefits, and alternatives to treatment were reviewed, and all questions addressed  Disclosure that this treatment is authorized for use under EUA and not FDA approved for treatment was discussed  · Chest x-ray 12/27/2020: Patchy bilateral peripheral groundglass opacity compatible with Covid 19 pneumonia  · CTA chest 12/27/2020: No evidence of pulmonary artery embolus  Diffuse airspace opacities throughout the lungs which may represent infection such as COVID pneumonia  Short-term follow-up chest CT scan in 3 months is recommended to evaluate for resolution  Mediastinal lymphadenopathy  Follow-up is recommended  · Chest x-ray 12/29/2020: Slight worsening of bilateral bilateral groundglass opacity due to Covid 19 pneumonia  This corresponds with the preliminary interpretation by the emergency department clinician  · Troponin: <0 02, , check CT  · Inflammatory markers slowly coming down  But patient is still short of breath she is still coughing  Still on 5 L  Will give her a dose of Lasix 40 mg IV x1 will recheck inflammatory markers tomorrow as well    · Continue Decadron  ·   · Pepcid  · Procalcitonin x2 is negative discontinue antibiotics  · Oxygen protocol  · Respiratory protocol  · Enoxaparin for anticoagulation  · Remdesivir

## 2021-01-01 PROBLEM — E87.1 HYPONATREMIA: Status: RESOLVED | Noted: 2020-12-29 | Resolved: 2021-01-01

## 2021-01-01 PROBLEM — E87.6 HYPOKALEMIA: Status: RESOLVED | Noted: 2020-12-29 | Resolved: 2021-01-01

## 2021-01-01 PROBLEM — J96.01 ACUTE RESPIRATORY FAILURE WITH HYPOXIA (HCC): Status: RESOLVED | Noted: 2020-12-30 | Resolved: 2021-01-01

## 2021-01-01 LAB
ALBUMIN SERPL BCP-MCNC: 2.3 G/DL (ref 3.5–5)
ALP SERPL-CCNC: 64 U/L (ref 46–116)
ALT SERPL W P-5'-P-CCNC: 23 U/L (ref 12–78)
ANION GAP SERPL CALCULATED.3IONS-SCNC: 10 MMOL/L (ref 4–13)
AST SERPL W P-5'-P-CCNC: 21 U/L (ref 5–45)
BASOPHILS # BLD MANUAL: 0 THOUSAND/UL (ref 0–0.1)
BASOPHILS NFR MAR MANUAL: 0 % (ref 0–1)
BILIRUB SERPL-MCNC: 0.5 MG/DL (ref 0.2–1)
BUN SERPL-MCNC: 16 MG/DL (ref 5–25)
CALCIUM ALBUM COR SERPL-MCNC: 9.7 MG/DL (ref 8.3–10.1)
CALCIUM SERPL-MCNC: 8.3 MG/DL (ref 8.3–10.1)
CHLORIDE SERPL-SCNC: 101 MMOL/L (ref 100–108)
CO2 SERPL-SCNC: 28 MMOL/L (ref 21–32)
CREAT SERPL-MCNC: 0.69 MG/DL (ref 0.6–1.3)
CRP SERPL QL: 25.7 MG/L
D DIMER PPP FEU-MCNC: 0.42 UG/ML FEU
EOSINOPHIL # BLD MANUAL: 0 THOUSAND/UL (ref 0–0.4)
EOSINOPHIL NFR BLD MANUAL: 0 % (ref 0–6)
ERYTHROCYTE [DISTWIDTH] IN BLOOD BY AUTOMATED COUNT: 12.1 % (ref 11.6–15.1)
FERRITIN SERPL-MCNC: 435 NG/ML (ref 8–388)
GFR SERPL CREATININE-BSD FRML MDRD: 103 ML/MIN/1.73SQ M
GLUCOSE SERPL-MCNC: 168 MG/DL (ref 65–140)
GLUCOSE SERPL-MCNC: 181 MG/DL (ref 65–140)
GLUCOSE SERPL-MCNC: 250 MG/DL (ref 65–140)
GLUCOSE SERPL-MCNC: 302 MG/DL (ref 65–140)
GLUCOSE SERPL-MCNC: 378 MG/DL (ref 65–140)
HCT VFR BLD AUTO: 36.2 % (ref 34.8–46.1)
HGB BLD-MCNC: 12.1 G/DL (ref 11.5–15.4)
LYMPHOCYTES # BLD AUTO: 0.98 THOUSAND/UL (ref 0.6–4.47)
LYMPHOCYTES # BLD AUTO: 17 % (ref 14–44)
MCH RBC QN AUTO: 28.1 PG (ref 26.8–34.3)
MCHC RBC AUTO-ENTMCNC: 33.4 G/DL (ref 31.4–37.4)
MCV RBC AUTO: 84 FL (ref 82–98)
MONOCYTES # BLD AUTO: 0.35 THOUSAND/UL (ref 0–1.22)
MONOCYTES NFR BLD: 6 % (ref 4–12)
NEUTROPHILS # BLD MANUAL: 4.43 THOUSAND/UL (ref 1.85–7.62)
NEUTS SEG NFR BLD AUTO: 77 % (ref 43–75)
NRBC BLD AUTO-RTO: 0 /100 WBCS
PLATELET # BLD AUTO: 231 THOUSANDS/UL (ref 149–390)
PLATELET BLD QL SMEAR: ADEQUATE
PMV BLD AUTO: 11.3 FL (ref 8.9–12.7)
POTASSIUM SERPL-SCNC: 3.2 MMOL/L (ref 3.5–5.3)
PROT SERPL-MCNC: 6.1 G/DL (ref 6.4–8.2)
RBC # BLD AUTO: 4.3 MILLION/UL (ref 3.81–5.12)
SODIUM SERPL-SCNC: 139 MMOL/L (ref 136–145)
TOTAL CELLS COUNTED SPEC: 100
WBC # BLD AUTO: 5.75 THOUSAND/UL (ref 4.31–10.16)

## 2021-01-01 PROCEDURE — 94760 N-INVAS EAR/PLS OXIMETRY 1: CPT

## 2021-01-01 PROCEDURE — 86140 C-REACTIVE PROTEIN: CPT | Performed by: FAMILY MEDICINE

## 2021-01-01 PROCEDURE — 99232 SBSQ HOSP IP/OBS MODERATE 35: CPT | Performed by: PHYSICIAN ASSISTANT

## 2021-01-01 PROCEDURE — 80053 COMPREHEN METABOLIC PANEL: CPT | Performed by: FAMILY MEDICINE

## 2021-01-01 PROCEDURE — 85379 FIBRIN DEGRADATION QUANT: CPT | Performed by: FAMILY MEDICINE

## 2021-01-01 PROCEDURE — 85007 BL SMEAR W/DIFF WBC COUNT: CPT | Performed by: FAMILY MEDICINE

## 2021-01-01 PROCEDURE — 82728 ASSAY OF FERRITIN: CPT | Performed by: FAMILY MEDICINE

## 2021-01-01 PROCEDURE — 82948 REAGENT STRIP/BLOOD GLUCOSE: CPT

## 2021-01-01 PROCEDURE — 85027 COMPLETE CBC AUTOMATED: CPT | Performed by: FAMILY MEDICINE

## 2021-01-01 RX ORDER — METOPROLOL TARTRATE 50 MG/1
50 TABLET, FILM COATED ORAL EVERY 12 HOURS SCHEDULED
Refills: 0 | Status: CANCELLED
Start: 2021-01-01

## 2021-01-01 RX ORDER — FUROSEMIDE 10 MG/ML
20 INJECTION INTRAMUSCULAR; INTRAVENOUS ONCE
Status: COMPLETED | OUTPATIENT
Start: 2021-01-01 | End: 2021-01-01

## 2021-01-01 RX ORDER — LISINOPRIL 10 MG/1
10 TABLET ORAL DAILY
Refills: 0 | Status: CANCELLED
Start: 2021-01-01

## 2021-01-01 RX ORDER — QUETIAPINE FUMARATE 25 MG/1
25 TABLET, FILM COATED ORAL
Status: DISCONTINUED | OUTPATIENT
Start: 2021-01-01 | End: 2021-01-02 | Stop reason: HOSPADM

## 2021-01-01 RX ORDER — MONTELUKAST SODIUM 10 MG/1
10 TABLET ORAL
Refills: 0 | Status: CANCELLED
Start: 2021-01-01

## 2021-01-01 RX ORDER — AMLODIPINE BESYLATE 10 MG/1
10 TABLET ORAL DAILY
Refills: 0 | Status: CANCELLED
Start: 2021-01-01

## 2021-01-01 RX ORDER — POTASSIUM CHLORIDE 20 MEQ/1
40 TABLET, EXTENDED RELEASE ORAL ONCE
Status: COMPLETED | OUTPATIENT
Start: 2021-01-01 | End: 2021-01-01

## 2021-01-01 RX ADMIN — FAMOTIDINE 20 MG: 20 TABLET, FILM COATED ORAL at 09:26

## 2021-01-01 RX ADMIN — GUAIFENESIN 200 MG: 100 SOLUTION ORAL at 17:27

## 2021-01-01 RX ADMIN — ENOXAPARIN SODIUM 40 MG: 40 INJECTION SUBCUTANEOUS at 20:17

## 2021-01-01 RX ADMIN — INSULIN HUMAN 20 UNITS: 100 INJECTION, SUSPENSION SUBCUTANEOUS at 09:03

## 2021-01-01 RX ADMIN — BENZONATATE 100 MG: 100 CAPSULE ORAL at 22:33

## 2021-01-01 RX ADMIN — QUETIAPINE FUMARATE 25 MG: 25 TABLET ORAL at 00:39

## 2021-01-01 RX ADMIN — DEXAMETHASONE SODIUM PHOSPHATE 6 MG: 4 INJECTION, SOLUTION INTRAMUSCULAR; INTRAVENOUS at 09:09

## 2021-01-01 RX ADMIN — MELATONIN TAB 3 MG 6 MG: 3 TAB at 22:33

## 2021-01-01 RX ADMIN — GUAIFENESIN 200 MG: 100 SOLUTION ORAL at 23:06

## 2021-01-01 RX ADMIN — INSULIN LISPRO 2 UNITS: 100 INJECTION, SOLUTION INTRAVENOUS; SUBCUTANEOUS at 09:05

## 2021-01-01 RX ADMIN — QUETIAPINE FUMARATE 25 MG: 25 TABLET ORAL at 22:59

## 2021-01-01 RX ADMIN — INSULIN LISPRO 5 UNITS: 100 INJECTION, SOLUTION INTRAVENOUS; SUBCUTANEOUS at 09:05

## 2021-01-01 RX ADMIN — INSULIN LISPRO 8 UNITS: 100 INJECTION, SOLUTION INTRAVENOUS; SUBCUTANEOUS at 12:34

## 2021-01-01 RX ADMIN — BENZONATATE 100 MG: 100 CAPSULE ORAL at 17:35

## 2021-01-01 RX ADMIN — HYDROCODONE POLISTIREX AND CHLORPHENIRAMINE POLISTIREX 5 ML: 10; 8 SUSPENSION, EXTENDED RELEASE ORAL at 10:31

## 2021-01-01 RX ADMIN — REMDESIVIR 100 MG: 100 INJECTION, POWDER, LYOPHILIZED, FOR SOLUTION INTRAVENOUS at 09:06

## 2021-01-01 RX ADMIN — FUROSEMIDE 20 MG: 10 INJECTION, SOLUTION INTRAMUSCULAR; INTRAVENOUS at 09:26

## 2021-01-01 RX ADMIN — INSULIN LISPRO 5 UNITS: 100 INJECTION, SOLUTION INTRAVENOUS; SUBCUTANEOUS at 17:28

## 2021-01-01 RX ADMIN — MONTELUKAST 10 MG: 10 TABLET, FILM COATED ORAL at 22:33

## 2021-01-01 RX ADMIN — OXYCODONE HYDROCHLORIDE AND ACETAMINOPHEN 1000 MG: 500 TABLET ORAL at 09:09

## 2021-01-01 RX ADMIN — ZINC SULFATE 220 MG (50 MG) CAPSULE 220 MG: CAPSULE at 09:07

## 2021-01-01 RX ADMIN — INSULIN LISPRO 5 UNITS: 100 INJECTION, SOLUTION INTRAVENOUS; SUBCUTANEOUS at 12:35

## 2021-01-01 RX ADMIN — FLUTICASONE PROPIONATE 1 SPRAY: 50 SPRAY, METERED NASAL at 09:09

## 2021-01-01 RX ADMIN — FAMOTIDINE 20 MG: 20 TABLET, FILM COATED ORAL at 17:27

## 2021-01-01 RX ADMIN — METOPROLOL TARTRATE 50 MG: 50 TABLET, FILM COATED ORAL at 09:08

## 2021-01-01 RX ADMIN — METOPROLOL TARTRATE 50 MG: 50 TABLET, FILM COATED ORAL at 20:17

## 2021-01-01 RX ADMIN — POTASSIUM CHLORIDE 40 MEQ: 1500 TABLET, EXTENDED RELEASE ORAL at 09:26

## 2021-01-01 RX ADMIN — AMLODIPINE BESYLATE 10 MG: 10 TABLET ORAL at 09:09

## 2021-01-01 RX ADMIN — INSULIN LISPRO 6 UNITS: 100 INJECTION, SOLUTION INTRAVENOUS; SUBCUTANEOUS at 22:34

## 2021-01-01 RX ADMIN — GUAIFENESIN 200 MG: 100 SOLUTION ORAL at 05:38

## 2021-01-01 RX ADMIN — Medication 2000 UNITS: at 09:06

## 2021-01-01 RX ADMIN — LISINOPRIL 10 MG: 10 TABLET ORAL at 09:07

## 2021-01-01 RX ADMIN — ENOXAPARIN SODIUM 40 MG: 40 INJECTION SUBCUTANEOUS at 09:06

## 2021-01-01 RX ADMIN — INSULIN LISPRO 10 UNITS: 100 INJECTION, SOLUTION INTRAVENOUS; SUBCUTANEOUS at 17:28

## 2021-01-01 RX ADMIN — INSULIN HUMAN 20 UNITS: 100 INJECTION, SUSPENSION SUBCUTANEOUS at 17:27

## 2021-01-01 RX ADMIN — BENZONATATE 100 MG: 100 CAPSULE ORAL at 09:09

## 2021-01-01 NOTE — ASSESSMENT & PLAN NOTE
Admitted under moderate pathway  Diagnostics:  Troponin: <0 02 x3  Procalcitonin: 0 18 --> 0 15  D-dimer: 1 13 --> 0 42  CRP: 62 6 --> 25 7  Ferritin: 435  ABO/Rh: O Positive  Chest x-ray 12/27/2020: Patchy bilateral peripheral groundglass opacity compatible with Covid 19 pneumonia  CTA chest 12/27/2020: No evidence of pulmonary artery embolus  Diffuse airspace opacities throughout the lungs which may represent infection such as COVID pneumonia  Short-term follow-up chest CT scan in 3 months is recommended to evaluate for resolution  Mediastinal lymphadenopathy  Chest x-ray 12/29/2020: Slight worsening of bilateral bilateral groundglass opacity due to Covid 19 pneumonia  Treatment:  Vitamin-C, vitamin-D and zinc (continue through 01/04/2021)  Dexamethasone  Transition to oral prednisone at discharge  (continue through 01/07/2021)  Remdesivir (continue through 01/02/2021)  Patient has negative procalcitonin x2  Will discontinue antibiotics  Anticoagulation:  Lovenox  Received convalescent plasma on 12/26/20

## 2021-01-01 NOTE — ASSESSMENT & PLAN NOTE
Lab Results   Component Value Date    HGBA1C 10 2 (H) 12/29/2020     Recent Labs     12/31/20  1634 12/31/20  2132 01/01/21  0816 01/01/21  1229   POCGLU 314* 268* 168* 302*     Blood Sugar Average: Last 72 hrs:  (P) 268 9  · Uncontrolled with hemoglobin A1c 6 of 10 6 and on steroids with hyperglycemia  · Started on NPH 20 units BID and humolog 5 units TID  Anticipate blood sugars will continue to trend down as we wean her off steroids

## 2021-01-01 NOTE — PROGRESS NOTES
Progress Note Anastacio Delacruz 1970, 53 y o  female MRN: [id number]  Unit/Bed#: -01 Encounter: [id number]  Primary Care Provider: Lisette Leiva DO   Date and time admitted to hospital: 12/29/2020  5:27 AM    * Pneumonia due to COVID-19 virus  Assessment & Plan  Admitted under moderate pathway  Diagnostics:  Troponin: <0 02 x3  Procalcitonin: 0 18 --> 0 15  D-dimer: 1 13 --> 0 42  CRP: 62 6 --> 25 7  Ferritin: 435  ABO/Rh: O Positive  Chest x-ray 12/27/2020: Patchy bilateral peripheral groundglass opacity compatible with Covid 19 pneumonia  CTA chest 12/27/2020: No evidence of pulmonary artery embolus  Diffuse airspace opacities throughout the lungs which may represent infection such as COVID pneumonia  Short-term follow-up chest CT scan in 3 months is recommended to evaluate for resolution  Mediastinal lymphadenopathy  Chest x-ray 12/29/2020: Slight worsening of bilateral bilateral groundglass opacity due to Covid 19 pneumonia  Treatment:  Vitamin-C, vitamin-D and zinc (continue through 01/04/2021)  Dexamethasone  Transition to oral prednisone at discharge  (continue through 01/07/2021)  Remdesivir (continue through 01/02/2021)  Patient has negative procalcitonin x2  Will discontinue antibiotics  Anticoagulation:  Lovenox  Received convalescent plasma on 12/26/20  Acute respiratory failure with hypoxia (HCC)  Assessment & Plan  · 2/2 covid 19 pneumonia  · Patient has been successfully weaned off of supplemental oxygen as of this morning  Continue to monitor closely  If she again requires oxygen she will need a home oxygen qualifying study prior to discharge  · Oxygen requirements have improved following IV Lasix      Non-insulin dependent type 2 diabetes mellitus [facility])  Assessment & Plan  Lab Results   Component Value Date    HGBA1C 10 2 (H) 12/29/2020     Recent Labs     12/31/20  1634 12/31/20  2132 01/01/21  0816 01/01/21  1229   POCGLU 314* 268* 168* 302*     Blood Sugar Average: Last 72 hrs:  (P) 268 9  · Uncontrolled with hemoglobin A1c 6 of 10 6 and on steroids with hyperglycemia  · Started on NPH 20 units BID and humolog 5 units TID  Anticipate blood sugars will continue to trend down as we wean her off steroids  Class 3 severe obesity due to excess calories with serious comorbidity and body mass index (BMI) of 40 0 to 44 9 in adult [facility])  Assessment & Plan  · Encourage intensive therapeutic lifestyle modification    Hypokalemia  Assessment & Plan  · Potassium this AM: 3 2  · Replete and monitor  Essential hypertension  Assessment & Plan  · BPs acceptable  · Discontinued hydrochlorothiazide in terms of med electrolyte abnormality but continue lisinopril and metoprolol  VTE Pharmacologic Prophylaxis:   Pharmacologic: Enoxaparin (Lovenox)  Mechanical: Mechanical VTE prophylaxis in place  Patient Centered Rounds: I have performed bedside rounds with nursing staff today  Discussions with Specialists or Other Care Team Provider:  Critical care  Education and Discussions with Family / Patient:  All patient questions answered to the best my ability  Called patient's mother to provide an update  Time Spent for Care: 30 minutes  More than 50% of total time spent on counseling and coordination of care as described above  Current Length of Stay: 3 day(s)  Current Patient Status: Inpatient   Certification Statement: The patient will continue to require additional inpatient hospital stay due to continued need to monitor oxygen demands    Discharge Plan: Patient is not yet medically stable for discharge  Anticipate discharge home in the next 24 hours as long she continues to improve and remains off oxygen  If however she requires supplemental oxygen again we can consider a home oxygen qualifying study prior to discharge  Code Status: Level 1 - Full Code    Subjective:   Patient is feeling much better today overall and has been weaned off her supplemental oxygen    She does feel short of breath at times but this is mild  She has a dry cough on occasion  She denies any pleuritic chest pain  She denies any diarrhea  Objective:   Vitals:   Temp (24hrs), Av 7 °F (35 9 °C), Min:96 6 °F (35 9 °C), Max:97 °F (36 1 °C)    Temp:  [96 6 °F (35 9 °C)-97 °F (36 1 °C)] 96 6 °F (35 9 °C)  HR:  [68-80] 68  Resp:  [20-22] 20  BP: (120-152)/(79-95) 137/80  SpO2:  [91 %-97 %] 97 %  Body mass index is 42 24 kg/m²  Input and Output Summary (last 24 hours): Intake/Output Summary (Last 24 hours) at 2021 1506  Last data filed at 2021 0800  Gross per 24 hour   Intake 460 ml   Output --   Net 460 ml       Physical Exam:     Physical Exam  Vitals signs reviewed  HENT:      Head: Normocephalic and atraumatic  Eyes:      General: No scleral icterus  Cardiovascular:      Rate and Rhythm: Normal rate and regular rhythm  Heart sounds: No murmur  Pulmonary:      Breath sounds: Normal breath sounds  No wheezing or rales  Chest:      Chest wall: No tenderness  Abdominal:      General: Bowel sounds are normal  There is no distension  Palpations: Abdomen is soft  Tenderness: There is no abdominal tenderness  Musculoskeletal: Normal range of motion  Skin:     General: Skin is warm and dry  Findings: No rash         Additional Data:   Labs:  Results from last 7 days   Lab Units 21  0524 20  0530   WBC Thousand/uL 5 75 5 13   HEMOGLOBIN g/dL 12 1 12 4   HEMATOCRIT % 36 2 37 8   PLATELETS Thousands/uL 231 231   NEUTROS PCT %  --  75   LYMPHS PCT %  --  17   LYMPHO PCT % 17  --    MONOS PCT %  --  7   MONO PCT % 6  --    EOS PCT % 0 0     Results from last 7 days   Lab Units 21  0524   POTASSIUM mmol/L 3 2*   CHLORIDE mmol/L 101   CO2 mmol/L 28   BUN mg/dL 16   CREATININE mg/dL 0 69   CALCIUM mg/dL 8 3   ALK PHOS U/L 64   ALT U/L 23   AST U/L 21     Results from last 7 days   Lab Units 20  0557   INR  1 01       * I Have Reviewed All Lab Data Listed Above  * Additional Pertinent Lab Tests Reviewed:  All Labs Within Last 24 Hours Reviewed    Imaging:    Imaging Reports Reviewed Today Include: None new    Cultures:   Blood Culture:   Lab Results   Component Value Date    BLOODCX No Growth at 72 hrs  12/29/2020    BLOODCX No Growth at 72 hrs  12/29/2020     Urine Culture: No results found for: URINECX  Sputum Culture: No components found for: SPUTUMCX  Wound Culture: No results found for: WOUNDCULT    Last 24 Hours Medication List:   Current Facility-Administered Medications   Medication Dose Route Frequency Provider Last Rate    acetaminophen  650 mg Oral Q6H PRN YUKI Magallon      albuterol  2 puff Inhalation Q4H PRN YUKI Magallon      amLODIPine  10 mg Oral Daily YUKI Magallon      ascorbic acid  1,000 mg Oral Daily Yuli Franklin Peace, DO      benzonatate  100 mg Oral Q8H YUKI Magallon      cholecalciferol  2,000 Units Oral Daily Ramond Maddison, DO      dexamethasone  6 mg Intravenous Q24H Michael Casper MD      enoxaparin  40 mg Subcutaneous Q12H [facility] YUKI Magallon      famotidine  20 mg Oral BID Michael Casper MD      fluticasone  1 spray Each Nare Daily YUKI Magallon      guaiFENesin  200 mg Oral Q4H PRN Michael Casper MD      hydrocodone-chlorpheniramine polistirex  5 mL Oral Q12H PRN Michael Casper MD      insulin lispro  2-12 Units Subcutaneous TID [facility] Michael Casper MD      insulin lispro  2-12 Units Subcutaneous HS Michael Casper MD      insulin lispro  5 Units Subcutaneous TID With Meals Mihcael Casper MD      insulin NPH  20 Units Subcutaneous BID AC Michael Casper MD      lisinopril  10 mg Oral Daily YUKI Magallon      melatonin  6 mg Oral HS Michael Casper MD      metoprolol tartrate  50 mg Oral Q12H [facility] YUKI Magallon      montelukast  10 mg Oral HS YUKI Magallon      ondansetron  4 mg Intravenous Q6H PRN YUKI Magallon      QUEtiapine 25 mg Oral HS PRN Teodora ConleyxYUKI      remdesivir  100 mg Intravenous Q24H YUKI Caraballo 0 mg (12/30/20 9731)    zinc sulfate  220 mg Oral Daily Fabiola Camargo DO          Today, Patient Was Seen By: Shirley Hicks PA-C    ** Please Note: Dragon 360 Dictation voice to text software may have been used in the creation of this document   **

## 2021-01-01 NOTE — ASSESSMENT & PLAN NOTE
· BPs acceptable  · Discontinued hydrochlorothiazide in terms of med electrolyte abnormality but continue lisinopril and metoprolol

## 2021-01-01 NOTE — ASSESSMENT & PLAN NOTE
· 2/2 covid 19 pneumonia  · Patient has been successfully weaned off of supplemental oxygen as of this morning  Continue to monitor closely  If she again requires oxygen she will need a home oxygen qualifying study prior to discharge  · Oxygen requirements have improved following IV Lasix

## 2021-01-02 VITALS
DIASTOLIC BLOOD PRESSURE: 69 MMHG | HEART RATE: 65 BPM | TEMPERATURE: 96.3 F | RESPIRATION RATE: 18 BRPM | BODY MASS INDEX: 42.1 KG/M2 | OXYGEN SATURATION: 90 % | HEIGHT: 68 IN | SYSTOLIC BLOOD PRESSURE: 144 MMHG | WEIGHT: 277.78 LBS

## 2021-01-02 LAB
ANION GAP SERPL CALCULATED.3IONS-SCNC: 4 MMOL/L (ref 4–13)
BUN SERPL-MCNC: 13 MG/DL (ref 5–25)
CALCIUM SERPL-MCNC: 8.5 MG/DL (ref 8.3–10.1)
CHLORIDE SERPL-SCNC: 101 MMOL/L (ref 100–108)
CO2 SERPL-SCNC: 31 MMOL/L (ref 21–32)
CREAT SERPL-MCNC: 0.83 MG/DL (ref 0.6–1.3)
ERYTHROCYTE [DISTWIDTH] IN BLOOD BY AUTOMATED COUNT: 12.1 % (ref 11.6–15.1)
GFR SERPL CREATININE-BSD FRML MDRD: 83 ML/MIN/1.73SQ M
GLUCOSE SERPL-MCNC: 174 MG/DL (ref 65–140)
GLUCOSE SERPL-MCNC: 186 MG/DL (ref 65–140)
GLUCOSE SERPL-MCNC: 192 MG/DL (ref 65–140)
HCT VFR BLD AUTO: 37.7 % (ref 34.8–46.1)
HGB BLD-MCNC: 12.7 G/DL (ref 11.5–15.4)
MCH RBC QN AUTO: 28.6 PG (ref 26.8–34.3)
MCHC RBC AUTO-ENTMCNC: 33.7 G/DL (ref 31.4–37.4)
MCV RBC AUTO: 85 FL (ref 82–98)
NRBC BLD AUTO-RTO: 0 /100 WBCS
PLATELET # BLD AUTO: 320 THOUSANDS/UL (ref 149–390)
PMV BLD AUTO: 11 FL (ref 8.9–12.7)
POTASSIUM SERPL-SCNC: 3.9 MMOL/L (ref 3.5–5.3)
RBC # BLD AUTO: 4.44 MILLION/UL (ref 3.81–5.12)
SODIUM SERPL-SCNC: 136 MMOL/L (ref 136–145)
WBC # BLD AUTO: 8.18 THOUSAND/UL (ref 4.31–10.16)

## 2021-01-02 PROCEDURE — 80048 BASIC METABOLIC PNL TOTAL CA: CPT | Performed by: FAMILY MEDICINE

## 2021-01-02 PROCEDURE — 85027 COMPLETE CBC AUTOMATED: CPT | Performed by: FAMILY MEDICINE

## 2021-01-02 PROCEDURE — 94761 N-INVAS EAR/PLS OXIMETRY MLT: CPT

## 2021-01-02 PROCEDURE — 82948 REAGENT STRIP/BLOOD GLUCOSE: CPT

## 2021-01-02 PROCEDURE — 99239 HOSP IP/OBS DSCHRG MGMT >30: CPT | Performed by: PHYSICIAN ASSISTANT

## 2021-01-02 RX ORDER — MULTIVITAMIN
1 CAPSULE ORAL DAILY
Qty: 30 CAPSULE | Refills: 0 | Status: SHIPPED | OUTPATIENT
Start: 2021-01-02 | End: 2021-05-03 | Stop reason: ALTCHOICE

## 2021-01-02 RX ORDER — ALBUTEROL SULFATE 90 UG/1
2 AEROSOL, METERED RESPIRATORY (INHALATION) EVERY 6 HOURS PRN
Qty: 18 G | Refills: 0 | Status: SHIPPED | OUTPATIENT
Start: 2021-01-02 | End: 2021-12-01 | Stop reason: ALTCHOICE

## 2021-01-02 RX ORDER — INSULIN LISPRO 100 [IU]/ML
10 INJECTION, SOLUTION INTRAVENOUS; SUBCUTANEOUS
Qty: 5 PEN | Refills: 0 | Status: SHIPPED | OUTPATIENT
Start: 2021-01-02 | End: 2021-01-26 | Stop reason: CLARIF

## 2021-01-02 RX ORDER — MELATONIN
2000 DAILY
Qty: 60 TABLET | Refills: 0 | Status: SHIPPED | OUTPATIENT
Start: 2021-01-03

## 2021-01-02 RX ORDER — FAMOTIDINE 20 MG/1
20 TABLET, FILM COATED ORAL 2 TIMES DAILY
Qty: 24 TABLET | Refills: 0 | Status: SHIPPED | OUTPATIENT
Start: 2021-01-02 | End: 2021-12-01 | Stop reason: ALTCHOICE

## 2021-01-02 RX ORDER — PREDNISONE 10 MG/1
40 TABLET ORAL DAILY
Qty: 24 TABLET | Refills: 0 | Status: SHIPPED | OUTPATIENT
Start: 2021-01-02 | End: 2021-01-08

## 2021-01-02 RX ADMIN — METOPROLOL TARTRATE 50 MG: 50 TABLET, FILM COATED ORAL at 08:02

## 2021-01-02 RX ADMIN — FAMOTIDINE 20 MG: 20 TABLET, FILM COATED ORAL at 08:02

## 2021-01-02 RX ADMIN — FLUTICASONE PROPIONATE 1 SPRAY: 50 SPRAY, METERED NASAL at 08:04

## 2021-01-02 RX ADMIN — DEXAMETHASONE SODIUM PHOSPHATE 6 MG: 4 INJECTION, SOLUTION INTRAMUSCULAR; INTRAVENOUS at 12:07

## 2021-01-02 RX ADMIN — HYDROCODONE POLISTIREX AND CHLORPHENIRAMINE POLISTIREX 5 ML: 10; 8 SUSPENSION, EXTENDED RELEASE ORAL at 08:02

## 2021-01-02 RX ADMIN — INSULIN LISPRO 4 UNITS: 100 INJECTION, SOLUTION INTRAVENOUS; SUBCUTANEOUS at 12:54

## 2021-01-02 RX ADMIN — AMLODIPINE BESYLATE 10 MG: 10 TABLET ORAL at 08:04

## 2021-01-02 RX ADMIN — GUAIFENESIN 200 MG: 100 SOLUTION ORAL at 12:54

## 2021-01-02 RX ADMIN — LISINOPRIL 10 MG: 10 TABLET ORAL at 08:02

## 2021-01-02 RX ADMIN — REMDESIVIR 100 MG: 100 INJECTION, POWDER, LYOPHILIZED, FOR SOLUTION INTRAVENOUS at 12:06

## 2021-01-02 RX ADMIN — INSULIN LISPRO 5 UNITS: 100 INJECTION, SOLUTION INTRAVENOUS; SUBCUTANEOUS at 07:57

## 2021-01-02 RX ADMIN — ENOXAPARIN SODIUM 40 MG: 40 INJECTION SUBCUTANEOUS at 07:59

## 2021-01-02 RX ADMIN — GUAIFENESIN 200 MG: 100 SOLUTION ORAL at 08:04

## 2021-01-02 RX ADMIN — OXYCODONE HYDROCHLORIDE AND ACETAMINOPHEN 1000 MG: 500 TABLET ORAL at 08:02

## 2021-01-02 RX ADMIN — BENZONATATE 100 MG: 100 CAPSULE ORAL at 08:04

## 2021-01-02 RX ADMIN — INSULIN HUMAN 20 UNITS: 100 INJECTION, SUSPENSION SUBCUTANEOUS at 07:56

## 2021-01-02 RX ADMIN — INSULIN LISPRO 5 UNITS: 100 INJECTION, SOLUTION INTRAVENOUS; SUBCUTANEOUS at 12:53

## 2021-01-02 RX ADMIN — Medication 2000 UNITS: at 08:02

## 2021-01-02 RX ADMIN — ZINC SULFATE 220 MG (50 MG) CAPSULE 220 MG: CAPSULE at 08:02

## 2021-01-02 RX ADMIN — INSULIN LISPRO 4 UNITS: 100 INJECTION, SOLUTION INTRAVENOUS; SUBCUTANEOUS at 07:57

## 2021-01-02 NOTE — NURSING NOTE
AVS printed and reviewed with pt  Copy given  Home oxygen tanks given to pt and instructed on how to use device

## 2021-01-02 NOTE — PLAN OF CARE
Pt to receive last dose of remdesivir and to be d/c'ed  Pt remains on RA with minimal dyspnea with ambulation  Labs drawn and results within normal limits        Problem: RESPIRATORY - ADULT  Goal: Achieves optimal ventilation and oxygenation  Description: INTERVENTIONS:  - Assess for changes in respiratory status  - Assess for changes in mentation and behavior  - Position to facilitate oxygenation and minimize respiratory effort  - Oxygen administered by appropriate delivery if ordered  - Initiate smoking cessation education as indicated  - Encourage broncho-pulmonary hygiene including cough, deep breathe, Incentive Spirometry  - Assess the need for suctioning and aspirate as needed  - Assess and instruct to report SOB or any respiratory difficulty  - Respiratory Therapy support as indicated  Outcome: Adequate for Discharge     Problem: METABOLIC, FLUID AND ELECTROLYTES - ADULT  Goal: Electrolytes maintained within normal limits  Description: INTERVENTIONS:  - Monitor labs and assess patient for signs and symptoms of electrolyte imbalances  - Administer electrolyte replacement as ordered  - Monitor response to electrolyte replacements, including repeat lab results as appropriate  - Instruct patient on fluid and nutrition as appropriate  Outcome: Adequate for Discharge  Goal: Fluid balance maintained  Description: INTERVENTIONS:  - Monitor labs   - Monitor I/O and WT  - Instruct patient on fluid and nutrition as appropriate  - Assess for signs & symptoms of volume excess or deficit  Outcome: Adequate for Discharge  Goal: Glucose maintained within target range  Description: INTERVENTIONS:  - Monitor Blood Glucose as ordered  - Assess for signs and symptoms of hyperglycemia and hypoglycemia  - Administer ordered medications to maintain glucose within target range  - Assess nutritional intake and initiate nutrition service referral as needed  Outcome: Adequate for Discharge     Problem: Potential for Falls  Goal: Patient will remain free of falls  Description: INTERVENTIONS:  - Assess patient frequently for physical needs  -  Identify cognitive and physical deficits and behaviors that affect risk of falls    -  [place] fall precautions as indicated by assessment   - Educate patient/family on patient safety including physical limitations  - Instruct patient to call for assistance with activity based on assessment  - Modify environment to reduce risk of injury  - Consider OT/PT consult to assist with strengthening/mobility  Outcome: Adequate for Discharge     Problem: PAIN - ADULT  Goal: Verbalizes/displays adequate comfort level or baseline comfort level  Description: Interventions:  - Encourage patient to monitor pain and request assistance  - Assess pain using appropriate pain scale  - Administer analgesics based on type and severity of pain and evaluate response  - Implement non-pharmacological measures as appropriate and evaluate response  - Consider cultural and social influences on pain and pain management  - Notify physician/advanced practitioner if interventions unsuccessful or patient reports new pain  Outcome: Adequate for Discharge     Problem: INFECTION - ADULT  Goal: Absence or prevention of progression during hospitalization  Description: INTERVENTIONS:  - Assess and monitor for signs and symptoms of infection  - Monitor lab/diagnostic results  - Monitor all insertion sites, i e  indwelling lines, tubes, and drains  - Monitor endotracheal if appropriate and nasal secretions for changes in amount and color  - [place] appropriate cooling/warming therapies per order  - Administer medications as ordered  - Instruct and encourage patient and family to use good hand hygiene technique  - Identify and instruct in appropriate isolation precautions for identified infection/condition  Outcome: Adequate for Discharge     Problem: SAFETY ADULT  Goal: Patient will remain free of falls  Description: INTERVENTIONS:  - Assess patient frequently for physical needs  -  Identify cognitive and physical deficits and behaviors that affect risk of falls    -  [place] fall precautions as indicated by assessment   - Educate patient/family on patient safety including physical limitations  - Instruct patient to call for assistance with activity based on assessment  - Modify environment to reduce risk of injury  - Consider OT/PT consult to assist with strengthening/mobility  Outcome: Adequate for Discharge  Goal: Maintain or return to baseline ADL function  Description: INTERVENTIONS:  -  Assess patient's ability to carry out ADLs; assess patient's baseline for ADL function and identify physical deficits which impact ability to perform ADLs (bathing, care of mouth/teeth, toileting, grooming, dressing, etc )  - Assess/evaluate cause of self-care deficits   - Assess range of motion  - Assess patient's mobility; develop plan if impaired  - Assess patient's need for assistive devices and provide as appropriate  - Encourage maximum independence but intervene and supervise when necessary  - Involve family in performance of ADLs  - Assess for home care needs following discharge   - Consider OT consult to assist with ADL evaluation and planning for discharge  - Provide patient education as appropriate  Outcome: Adequate for Discharge  Goal: Maintain or return mobility status to optimal level  Description: INTERVENTIONS:  - Assess patient's baseline mobility status (ambulation, transfers, stairs, etc )    - Identify cognitive and physical deficits and behaviors that affect mobility  - Identify mobility aids required to assist with transfers and/or ambulation (gait belt, sit-to-stand, lift, walker, cane, etc )  - [place] fall precautions as indicated by assessment  - Record patient progress and toleration of activity level on Mobility SBAR; progress patient to next Phase/Stage  - Instruct patient to call for assistance with activity based on assessment  - Consider rehabilitation consult to assist with strengthening/weightbearing, etc   Outcome: Adequate for Discharge     Problem: DISCHARGE PLANNING  Goal: Discharge to home or other facility with appropriate resources  Description: INTERVENTIONS:  - Identify barriers to discharge w/patient and caregiver  - Arrange for needed discharge resources and transportation as appropriate  - Identify discharge learning needs (meds, wound care, etc )  - Arrange for interpretive services to assist at discharge as needed  - Refer to Case Management Department for coordinating discharge planning if the patient needs post-hospital services based on physician/advanced practitioner order or complex needs related to functional status, cognitive ability, or social support system  Outcome: Adequate for Discharge     Problem: Knowledge Deficit  Goal: Patient/family/caregiver demonstrates understanding of disease process, treatment plan, medications, and discharge instructions  Description: Complete learning assessment and assess knowledge base    Interventions:  - Provide teaching at level of understanding  - Provide teaching via preferred learning methods  Outcome: Adequate for Discharge

## 2021-01-02 NOTE — RESPIRATORY THERAPY NOTE
Home Oxygen Qualifying Test       Patient name: Arsen Calabrese        : 1970   Date of Test:  2021  Diagnosis:      Home Oxygen Test:    **Medicare Guidelines require item(s) 1-5 on all ambulatory patients or 1 and 2 on non-ambulatory patients  1   Baseline SPO2 on Room Air at rest 89 %  2   SPO2 during exercise on Room Air 82 %  During exercise monitor SpO2  If SPO2 increases >=89% with ambulation do not add supplemental             oxygen  If <= 88% on room air add O2 via NC and titrate patient  Patient must be ambulated with O2 and titrated to > 88% with exertion  3   SPO2 on Oxygen at rest 94 % 6 lpm     4   SPO2 during exercise on Oxygen  90% a liter flow of 6 lpm     5   Exercise performed:          walking          [x]  Supplemental Home Oxygen is indicated  []  Client does not qualify for home oxygen  Respiratory Additional Notes- Pt required 6LPM NC to maintain SpO2>88%     Maciej Cole, RT

## 2021-01-02 NOTE — DISCHARGE SUMMARY
Discharge- Sunny Bradley 1970, 53 y o  female MRN: [id number]  Unit/Bed#: -01 Encounter: [id number]  Primary Care Provider: Sid Bello DO   Date and time admitted to hospital: 12/29/2020  5:27 AM    * Pneumonia due to COVID-19 virus  Assessment & Plan  Admitted under moderate pathway  Diagnostics:  Troponin: <0 02 x3  Procalcitonin: 0 18 --> 0 15  D-dimer: 1 13 --> 0 42  CRP: 62 6 --> 25 7  Ferritin: 435  ABO/Rh: O Positive  Chest x-ray 12/27/2020: Patchy bilateral peripheral groundglass opacity compatible with Covid 19 pneumonia  CTA chest 12/27/2020: No evidence of pulmonary artery embolus  Diffuse airspace opacities throughout the lungs which may represent infection such as COVID pneumonia  Short-term follow-up chest CT scan in 3 months is recommended to evaluate for resolution  Mediastinal lymphadenopathy  Chest x-ray 12/29/2020: Slight worsening of bilateral bilateral groundglass opacity due to Covid 19 pneumonia  Treatment:  Vitamin-C, vitamin-D and zinc (continue through 01/04/2021)  Dexamethasone  Transition to oral prednisone at discharge  (continue through 01/07/2021)  Remdesivir (continue through 01/02/2021)  Patient has negative procalcitonin x2  Will discontinue antibiotics  Anticoagulation:  Lovenox  Received convalescent plasma on 12/26/20  Qualifies for home O2 4-6L at discharge - delivered to bedside prior to discharge    Non-insulin dependent type 2 diabetes mellitus [facility])  Assessment & Plan  Lab Results   Component Value Date    HGBA1C 10 2 (H) 12/29/2020     Recent Labs     12/31/20  2132 01/01/21  0816 01/01/21  1229 01/01/21  1608   POCGLU 268* 168* 302* 378*     Blood Sugar Average: Last 72 hrs:  (P) 278  · Uncontrolled with hemoglobin A1c 6 of 10 6 with a component of steroid-induced hyperglycemia  · Started on NPH 30 units BID and humolog 10 units TID  Anticipate blood sugars will continue to trend down as we wean her off steroids    · Patient states she took Januvia at home  Had bad reaction to metformin in the past  · Will discharge with NPH insulin and insulin lispro given elevated hemoglobin A1c    Acute respiratory failure with hypoxia (HCC)  Assessment & Plan  · Secondary to COVID-19 pneumonia  · Patient was weaned off of supplemental oxygen at rest s/p IV Lasix however with exertion, patient does require oxygen  She has been qualify for home oxygen which has been delivered to bedside prior to discharge  Class 3 severe obesity due to excess calories with serious comorbidity and body mass index (BMI) of 40 0 to 44 9 in adult [facility])  Assessment & Plan  · Encourage intensive therapeutic lifestyle modification    Essential hypertension  Assessment & Plan  · Continue lisinopril and metoprolol  Hypokalemia-resolved as of 1/1/2021  Assessment & Plan  · Resolved    Hyponatremia-resolved as of 1/1/2021  Assessment & Plan  · resolved    Discharging Physician / Practitioner: Favio Chapman PA-C  PCP: Dequan Steel DO  Admission Date:   Admission Orders (From admission, onward)     Ordered        12/29/20 0646  Inpatient Admission  Once                   Discharge Date: 01/02/21    Resolved Problems  Date Reviewed: 1/2/2021          Resolved    Hyponatremia 1/1/2021     Resolved by  Favio Chapman PA-C    Hypokalemia 1/1/2021     Resolved by  Favio Chapman PA-C          Consultations During Hospital Stay:  · None    Procedures Performed:   · Chest CT (12/25/2020):  No PE  Diffuse airspace opacities throughout the lungs which may represent infection such as COVID pneumonia  Mediastinal lymphadenopathy  · Chest x-ray (12/27/2020) :  Patchy bilateral peripheral ground-glass opacities compatible with COVID-19 pneumonia  · Chest x-ray (12/29/2020):  Slightly worsening of bilateral ground-glass opacities secondary to COVID-19 pneumonia      Significant Findings / Test Results:   · See above    Incidental Findings:   · None     Test Results Pending at Discharge (will require follow up): · None     Outpatient Tests Requested:  · None    Complications:  Acute hypoxic respiratory failure    Reason for Admission:  Acute respiratory failure with hypoxia  Hospital Course:     Kaya Spencer is a 53 y o  female patient with past medical history of essential hypertension, non-insulin-dependent DM type 2, and obesity who originally presented to the hospital on 12/29/2020 due to shortness of breath and cough  The patient had been diagnosed with COVID-19 pneumonia on 12/25/2020  Due to worsening of symptoms, she had presented again on 12/27/2020 and was discharged home on vitamins and steroids as her oxygen saturations were greater than 95%  The patient presented again on 12/29/2020 due to worsening cough and shortness of breath  She also complained of fever, abdominal pain, and diarrhea at the time  Upon presentation, the patient was mildly hyponatremic with sodium 130  S/p fluid bolus this resolved  She was also hypokalemic and received supplementation  She initially required 1 L nasal cannula to maintain saturations, however this quickly increased to 5 L of oxygen  Chest x-ray showed worsening of infiltrates  She was started on remdesivir and moderate distress pathway  She received a dose of Lasix during this admission  Antibiotics were discontinued as procalcitonin was negative x2  The patient's serum glucose was elevated and she was started on regular insulin  Her hemoglobin A1c was elevated at 10 2  At home, she did not take insulin, only Januvia  The patient had gradual improvement  She is afebrile  Her vital signs are stable  She was weaned to room air however when a respiratory therapist assessed patient has home oxygen needs, she actually qualified for 6 L of nasal cannula with exertion although the patient herself said she felt that this was "too much"   01/02/2021, the patient is eager for discharge home    She is agreeable to restarting insulin  Oxygen has been delivered to patient's bedside  Please see above list of diagnoses and related plan for additional information  Condition at Discharge: stable     Discharge Day Visit / Exam:     Subjective:  Patient feels very well  She believes 6 L nasal cannula is too much  Agreeable to starting insulin at discharge  Took metformin in the past and had bad reaction to it  Took Januvia at home  Does not feel short of breath  Denies chest pain  Vitals: Blood Pressure: 144/69 (01/02/21 0750)  Pulse: 65 (01/02/21 0750)  Temperature: (!) 96 3 °F (35 7 °C) (01/01/21 2300)  Temp Source: Oral (01/02/21 0750)  Respirations: 18 (01/02/21 0750)  Height: 5' 8" (172 7 cm) (12/30/20 1000)  Weight - Scale: 126 kg (277 lb 12 5 oz) (12/29/20 0659)  SpO2: 90 % (01/02/21 0830)  Exam:   Physical Exam  Vitals signs and nursing note reviewed  Constitutional:       General: She is not in acute distress  Appearance: She is well-developed  She is obese  HENT:      Head: Normocephalic and atraumatic  Eyes:      Conjunctiva/sclera: Conjunctivae normal    Neck:      Musculoskeletal: Neck supple  Cardiovascular:      Rate and Rhythm: Normal rate and regular rhythm  Heart sounds: No murmur  Pulmonary:      Effort: Pulmonary effort is normal  No respiratory distress  Breath sounds: Normal breath sounds  Abdominal:      Palpations: Abdomen is soft  Tenderness: There is no abdominal tenderness  Skin:     General: Skin is warm and dry  Neurological:      Mental Status: She is alert  Discussion with Family:  Patient's mother has been updated during this admission however on 01/02/2021, patient said she did not need her mother to be updated  Discharge instructions/Information to patient and family:   See after visit summary for information provided to patient and family        Provisions for Follow-Up Care:  See after visit summary for information related to follow-up care and any pertinent home health orders  Disposition:     Home    For Discharges to [address] [place] [address] SNF:   · Not Applicable to this Patient - Not Applicable to this Patient    Planned Readmission: No     Discharge Statement:  I spent 45 minutes discharging the patient  This time was spent on the day of discharge  I had direct contact with the patient on the day of discharge  Greater than 50% of the total time was spent examining patient, answering all patient questions, arranging and discussing plan of care with patient as well as directly providing post-discharge instructions  Additional time then spent on discharge activities  Discharge Medications:  See after visit summary for reconciled discharge medications provided to patient and family        ** Please Note: This note has been constructed using a voice recognition system **

## 2021-01-02 NOTE — SOCIAL WORK
CM faxed coupon for one free month of Elquis to pts pharmacy, [address] [place] [address]  Pt made aware  [address] [place] [address] can accept pt for home o2  CM delivered two portable tanks outside of pts hospital door, bedside nurse made aware  CM also giving pt a pulse oximeter to measure her O2 sats at home

## 2021-01-02 NOTE — ASSESSMENT & PLAN NOTE
Admitted under moderate pathway  Diagnostics:  Troponin: <0 02 x3  Procalcitonin: 0 18 --> 0 15  D-dimer: 1 13 --> 0 42  CRP: 62 6 --> 25 7  Ferritin: 435  ABO/Rh: O Positive  Chest x-ray 12/27/2020: Patchy bilateral peripheral groundglass opacity compatible with Covid 19 pneumonia  CTA chest 12/27/2020: No evidence of pulmonary artery embolus  Diffuse airspace opacities throughout the lungs which may represent infection such as COVID pneumonia  Short-term follow-up chest CT scan in 3 months is recommended to evaluate for resolution  Mediastinal lymphadenopathy  Chest x-ray 12/29/2020: Slight worsening of bilateral bilateral groundglass opacity due to Covid 19 pneumonia  Treatment:  Vitamin-C, vitamin-D and zinc (continue through 01/04/2021)  Dexamethasone  Transition to oral prednisone at discharge  (continue through 01/07/2021)  Remdesivir (continue through 01/02/2021)  Patient has negative procalcitonin x2  Will discontinue antibiotics  Anticoagulation:  Lovenox  Received convalescent plasma on 12/26/20     Qualifies for home O2 4-6L at discharge - delivered to bedside prior to discharge

## 2021-01-02 NOTE — SOCIAL WORK
Pt requires supplemental O2 for home at time of discharge  CM uploading Rx and O2 studies into [facility], will made referral to [facility] for pts o2 needs    Awaiting reply

## 2021-01-02 NOTE — ASSESSMENT & PLAN NOTE
· Secondary to COVID-19 pneumonia  · Patient was weaned off of supplemental oxygen at rest s/p IV Lasix however with exertion, patient does require oxygen  She has been qualify for home oxygen which has been delivered to bedside prior to discharge

## 2021-01-02 NOTE — ASSESSMENT & PLAN NOTE
Lab Results   Component Value Date    HGBA1C 10 2 (H) 12/29/2020     Recent Labs     12/31/20  2132 01/01/21  0816 01/01/21  1229 01/01/21  1608   POCGLU 268* 168* 302* 378*     Blood Sugar Average: Last 72 hrs:  (P) 278  · Uncontrolled with hemoglobin A1c 6 of 10 6 with a component of steroid-induced hyperglycemia  · Started on NPH 30 units BID and humolog 10 units TID  Anticipate blood sugars will continue to trend down as we wean her off steroids  · Patient states she took Januvia at home    Had bad reaction to metformin in the past  · Will discharge with NPH insulin and insulin lispro given elevated hemoglobin A1c

## 2021-01-03 LAB
BACTERIA BLD CULT: NORMAL
BACTERIA BLD CULT: NORMAL

## 2021-01-07 LAB — GLUCOSE SERPL-MCNC: 353 MG/DL (ref 65–140)

## 2021-01-26 ENCOUNTER — OFFICE VISIT (OUTPATIENT)
Dept: CARDIOLOGY CLINIC | Facility: CLINIC | Age: 51
End: 2021-01-26
Payer: COMMERCIAL

## 2021-01-26 ENCOUNTER — APPOINTMENT (OUTPATIENT)
Dept: LAB | Facility: HOSPITAL | Age: 51
End: 2021-01-26
Attending: INTERNAL MEDICINE
Payer: COMMERCIAL

## 2021-01-26 DIAGNOSIS — E87.6 HYPOKALEMIA: ICD-10-CM

## 2021-01-26 DIAGNOSIS — E66.01 CLASS 3 SEVERE OBESITY DUE TO EXCESS CALORIES WITH SERIOUS COMORBIDITY AND BODY MASS INDEX (BMI) OF 40.0 TO 44.9 IN ADULT (HCC): ICD-10-CM

## 2021-01-26 DIAGNOSIS — I10 ESSENTIAL HYPERTENSION: Primary | ICD-10-CM

## 2021-01-26 DIAGNOSIS — I10 ESSENTIAL HYPERTENSION: ICD-10-CM

## 2021-01-26 PROBLEM — J96.01 ACUTE RESPIRATORY FAILURE WITH HYPOXIA (HCC): Status: RESOLVED | Noted: 2020-12-30 | Resolved: 2021-01-26

## 2021-01-26 PROBLEM — U07.1 PNEUMONIA DUE TO COVID-19 VIRUS: Status: RESOLVED | Noted: 2020-12-29 | Resolved: 2021-01-26

## 2021-01-26 PROBLEM — J12.82 PNEUMONIA DUE TO COVID-19 VIRUS: Status: RESOLVED | Noted: 2020-12-29 | Resolved: 2021-01-26

## 2021-01-26 LAB
ANION GAP SERPL CALCULATED.3IONS-SCNC: 8 MMOL/L (ref 4–13)
BUN SERPL-MCNC: 12 MG/DL (ref 5–25)
CALCIUM SERPL-MCNC: 9.2 MG/DL (ref 8.3–10.1)
CHLORIDE SERPL-SCNC: 105 MMOL/L (ref 100–108)
CO2 SERPL-SCNC: 29 MMOL/L (ref 21–32)
CREAT SERPL-MCNC: 0.79 MG/DL (ref 0.6–1.3)
GFR SERPL CREATININE-BSD FRML MDRD: 88 ML/MIN/1.73SQ M
GLUCOSE SERPL-MCNC: 182 MG/DL (ref 65–140)
POTASSIUM SERPL-SCNC: 4.1 MMOL/L (ref 3.5–5.3)
SODIUM SERPL-SCNC: 142 MMOL/L (ref 136–145)

## 2021-01-26 PROCEDURE — 99214 OFFICE O/P EST MOD 30 MIN: CPT | Performed by: INTERNAL MEDICINE

## 2021-01-26 PROCEDURE — 36415 COLL VENOUS BLD VENIPUNCTURE: CPT

## 2021-01-26 PROCEDURE — 80048 BASIC METABOLIC PNL TOTAL CA: CPT

## 2021-01-26 RX ORDER — SITAGLIPTIN 100 MG/1
100 TABLET, FILM COATED ORAL DAILY
COMMUNITY
Start: 2020-12-08 | End: 2021-10-28 | Stop reason: ALTCHOICE

## 2021-01-26 RX ORDER — GLIPIZIDE 5 MG/1
5 TABLET ORAL 2 TIMES DAILY
COMMUNITY
Start: 2021-01-04 | End: 2022-08-04 | Stop reason: SDUPTHER

## 2021-01-26 NOTE — PATIENT INSTRUCTIONS
Continue current medications  Updated blood work  If potassium level is ok we will restart hydrochlorothiazide  If hydrochlorothiazide is restarted I would recommend updated blood work in 1 month  Will plan quick follow-up in 3 months and if everything is stable resume yearly visits

## 2021-01-26 NOTE — PROGRESS NOTES
Cardiology Office Visit    Antionette Morris  [id number]  1970     [facility]  [id number] [facility] [address] [facility] [address] [facility]      Dear Rachael Boss DO,    I had the pleasure of seeing your patient at our [address] [place] [address] office today 1/26/2021  As you know she is a pleasant 53y o  year old female with a medical history as described below  Reason for office visit:  Follow-up hypertension  1  Essential hypertension  Assessment & Plan:  Patient with longstanding hypertension  Blood pressure is mildly elevated on exam   Will check BMP and if potassium and kidney function are normal would recommend resuming hydrochlorothiazide 25 mg daily  I have also recommended repeat BMP in 1 month  Orders:  -     Basic metabolic panel; Future; Expected date: 01/26/2021  -     Basic metabolic panel; Future; Expected date: 02/26/2021    2  Hypokalemia  -     Basic metabolic panel; Future; Expected date: 01/26/2021  -     Basic metabolic panel; Future; Expected date: 02/26/2021    3  Class 3 severe obesity due to excess calories with serious comorbidity and body mass index (BMI) of 40 0 to 44 9 in [facility])  Assessment & Plan:  We discussed the importance of diet, exercise and weight loss  HPI   Patient previously followed with Dr John Washburn who has since left the practice  Patient has a history of hypertension and has been on blood pressure medications since around the year 2000 (in her late 20's)  Echocardiogram 05/2019 showed normal LV function with an EF of 60% and grade 2 diastolic dysfunction  Family history of premature coronary artery disease in her father who had an MI in his 46s  Patient was last seen 09/16/2019 at which time she was doing well  1/26/2021:  Patient was noted to have [place] in December and was hospitalized in the ICU from 12/29/20-1/2/21    She was discharged home on her normal blood pressure medications with the exception of HCTZ being stopped  It appears that she was hypokalemic during her stay and I suspect this is why this was discontinued  She was discharged home on oxygen  She was also discharged home on steroids  She is currently only using oxygen if needed and admits that over the last few days she has not needed to use this  She is using an incentive spirometer at home  She does continue to have dyspnea on exertion  She does continue to have cough  She has returned to work  She denies any overt chest pain      Patient Active Problem List   Diagnosis    Essential hypertension    Class 3 severe obesity due to excess calories with serious comorbidity and body mass index (BMI) of 40 0 to 44 9 in adult ([place] [id number] )    Non-insulin dependent type 2 diabetes mellitus ([place] [address] )    Von Willebrand disease ([place] [address] )     Past Medical History:   Diagnosis Date    COVID-19     Diverticulosis     Dry eye     Hypertension     Seasonal allergies     Thyroid nodule     Von Willebrand disease ([place] [address] )      Social History     Socioeconomic History    Marital status: Single     Spouse name: Not on file    Number of children: Not on file    Years of education: Not on file    Highest education level: Not on file   Occupational History    Not on file   Social Needs    Financial resource strain: Not on file    Food insecurity     Worry: Not on file     Inability: Not on file    Transportation needs     Medical: Not on file     Non-medical: Not on file   Tobacco Use    Smoking status: Never Smoker    Smokeless tobacco: Never Used   Substance and Sexual Activity    Alcohol use: Not Currently    Drug use: Never    Sexual activity: Not on file     Comment: Defer   Lifestyle    Physical activity     Days per week: Not on file     Minutes per session: Not on file    Stress: Not on file   Relationships    Social connections     Talks on phone: Not on file     Gets together: Not on file     Attends Bahai service: Not on file     Active member of club or organization: Not on file     Attends meetings of clubs or organizations: Not on file     Relationship status: Not on file    Intimate partner violence     Fear of current or ex partner: Not on file     Emotionally abused: Not on file     Physically abused: Not on file     Forced sexual activity: Not on file   Other Topics Concern    Not on file   Social History Narrative    Not on file      Family History   Problem Relation Age of Onset    Hypertension Mother     Hyperlipidemia Mother     Diabetes type II Mother     Breast cancer Mother     Thyroid cancer Mother     Hypertension Father     Heart attack Father     Hyperlipidemia Father     Lung cancer Father     Hypertension Maternal Grandmother     Cancer Maternal Grandmother     No Known Problems Maternal Grandfather     No Known Problems Paternal Grandmother     No Known Problems Paternal Grandfather     Skin cancer Maternal Aunt     No Known Problems Paternal Aunt      Past Surgical History:   Procedure Laterality Date    ARTHROSCOPIC REPAIR ACL Left     CHOLECYSTECTOMY      HYSTERECTOMY  2010    partial hysterectomy    KNEE ARTHROSCOPY Right     REDUCTION MAMMAPLASTY Bilateral 2016       Current Outpatient Medications:     albuterol (Ventolin HFA) 90 mcg/act inhaler, Inhale 2 puffs every 6 (six) hours as needed for wheezing, Disp: 18 g, Rfl: 0    amLODIPine (NORVASC) 10 mg tablet, , Disp: , Rfl: 2    apixaban (ELIQUIS) 2 5 mg, Take 1 tablet (2 5 mg total) by mouth 2 (two) times a day for 28 days, Disp: 56 tablet, Rfl: 0    ascorbic acid (VITAMIN C) 1000 MG tablet, Take 1 tablet (1,000 mg total) by mouth daily, Disp: 7 tablet, Rfl: 0    cholecalciferol (VITAMIN D3) 1,000 units tablet, Take 2 tablets (2,000 Units total) by mouth daily, Disp: 60 tablet, Rfl: 0    cycloSPORINE (RESTASIS) 0 05 % ophthalmic emulsion, 1 drop 2 (two) times a day, Disp: , Rfl:     famotidine (PEPCID) 20 mg tablet, Take 1 tablet (20 mg total) by mouth 2 (two) times a day for 12 days, Disp: 24 tablet, Rfl: 0    fluticasone (FLONASE) 50 mcg/act nasal spray, , Disp: , Rfl: 4    Januvia 100 MG tablet, Take 100 mg by mouth daily, Disp: , Rfl:     lisinopril (ZESTRIL) 10 mg tablet, , Disp: , Rfl: 4    meloxicam (MOBIC) 15 mg tablet, , Disp: , Rfl: 4    metoprolol tartrate (LOPRESSOR) 50 mg tablet, , Disp: , Rfl: 2    montelukast (SINGULAIR) 10 mg tablet, , Disp: , Rfl: 2    Multiple Vitamin (multivitamin) capsule, Take 1 capsule by mouth daily, Disp: 30 capsule, Rfl: 0    omeprazole (PriLOSEC) 20 mg delayed release capsule, , Disp: , Rfl: 4    glipiZIDE (GLUCOTROL) 5 mg tablet, 5 mg 2 (two) times a day, Disp: , Rfl:   No Known Allergies      Test Results:     Echocardiogram 05/17/2019:  EF 60%  Grade 2 diastolic dysfunction  Left atrial size upper limit of normal     Review of Systems:    Review of Systems   Constitutional: Positive for fatigue  Negative for activity change and appetite change  HENT: Negative for congestion, hearing loss, tinnitus and trouble swallowing  Eyes: Negative for visual disturbance  Respiratory: Positive for cough and shortness of breath  Negative for chest tightness and wheezing  Cardiovascular: Negative for chest pain, palpitations and leg swelling  Gastrointestinal: Negative for abdominal distention, abdominal pain, nausea and vomiting  Genitourinary: Negative for difficulty urinating  Musculoskeletal: Negative for arthralgias  Skin: Negative for rash  Neurological: Negative for dizziness, syncope and light-headedness  Hematological: Does not bruise/bleed easily  Psychiatric/Behavioral: Negative for confusion  The patient is not nervous/anxious  All other systems reviewed and are negative          Vitals:    01/26/21 1102 01/26/21 1115   BP: 148/100 138/94   BP Location:  Left arm   Patient Position:  Sitting   Pulse: 77    Resp: 18    Temp: (!) 97 2 °F (36 2 °C)    SpO2: 97%    Weight: 124 kg (273 lb)    Height: 5' 8" (1 727 m)      Vitals:    01/26/21 1102   Weight: 124 kg (273 lb)     Height: 5' 8" (172 7 cm)     Physical Exam   Constitutional: She is oriented to person, place, and time  She appears well-developed and well-nourished  HENT:   Head: Normocephalic and atraumatic  Eyes: Pupils are equal, round, and reactive to light  Conjunctivae are normal    Neck: Normal range of motion  No JVD present  Cardiovascular: Normal rate, regular rhythm and normal heart sounds  Exam reveals no gallop and no friction rub  No murmur heard  Pulmonary/Chest: Effort normal  She has decreased breath sounds in the left lower field  Abdominal: Soft  Bowel sounds are normal    Musculoskeletal:         General: No edema  Neurological: She is alert and oriented to person, place, and time  Skin: Skin is warm and dry  Psychiatric: She has a normal mood and affect  Her behavior is normal    Vitals reviewed

## 2021-01-27 ENCOUNTER — TELEPHONE (OUTPATIENT)
Dept: CARDIOLOGY CLINIC | Facility: CLINIC | Age: 51
End: 2021-01-27

## 2021-01-27 VITALS
HEART RATE: 77 BPM | OXYGEN SATURATION: 97 % | SYSTOLIC BLOOD PRESSURE: 138 MMHG | RESPIRATION RATE: 18 BRPM | BODY MASS INDEX: 41.37 KG/M2 | WEIGHT: 273 LBS | DIASTOLIC BLOOD PRESSURE: 94 MMHG | HEIGHT: 68 IN | TEMPERATURE: 97.2 F

## 2021-01-27 NOTE — ASSESSMENT & PLAN NOTE
Patient with longstanding hypertension  Blood pressure is mildly elevated on exam   Will check BMP and if potassium and kidney function are normal would recommend resuming hydrochlorothiazide 25 mg daily  I have also recommended repeat BMP in 1 month

## 2021-02-17 LAB
LEFT EYE DIABETIC RETINOPATHY: NORMAL
RIGHT EYE DIABETIC RETINOPATHY: NORMAL

## 2021-03-09 ENCOUNTER — OFFICE VISIT (OUTPATIENT)
Dept: PULMONOLOGY | Facility: CLINIC | Age: 51
End: 2021-03-09
Payer: COMMERCIAL

## 2021-03-09 VITALS
HEART RATE: 74 BPM | WEIGHT: 276 LBS | DIASTOLIC BLOOD PRESSURE: 110 MMHG | SYSTOLIC BLOOD PRESSURE: 174 MMHG | BODY MASS INDEX: 41.83 KG/M2 | OXYGEN SATURATION: 96 % | HEIGHT: 68 IN

## 2021-03-09 DIAGNOSIS — U07.1 PNEUMONIA DUE TO COVID-19 VIRUS: ICD-10-CM

## 2021-03-09 DIAGNOSIS — J12.82 PNEUMONIA DUE TO COVID-19 VIRUS: ICD-10-CM

## 2021-03-09 DIAGNOSIS — I10 ESSENTIAL HYPERTENSION: Primary | ICD-10-CM

## 2021-03-09 DIAGNOSIS — J96.01 ACUTE RESPIRATORY FAILURE WITH HYPOXIA (HCC): ICD-10-CM

## 2021-03-09 DIAGNOSIS — R09.02 HYPOXIA: ICD-10-CM

## 2021-03-09 PROBLEM — K21.9 ACID REFLUX: Status: ACTIVE | Noted: 2021-03-09

## 2021-03-09 PROCEDURE — 99244 OFF/OP CNSLTJ NEW/EST MOD 40: CPT | Performed by: INTERNAL MEDICINE

## 2021-03-09 RX ORDER — AMOXICILLIN AND CLAVULANATE POTASSIUM 875; 125 MG/1; MG/1
TABLET, FILM COATED ORAL
COMMUNITY
Start: 2021-01-26 | End: 2021-05-03 | Stop reason: ALTCHOICE

## 2021-03-09 RX ORDER — HYDROCHLOROTHIAZIDE 25 MG/1
25 TABLET ORAL DAILY
COMMUNITY
End: 2021-05-03 | Stop reason: SDUPTHER

## 2021-03-09 RX ORDER — FENOFIBRATE 145 MG/1
145 TABLET, COATED ORAL DAILY
COMMUNITY
End: 2021-12-01 | Stop reason: ALTCHOICE

## 2021-03-09 NOTE — ASSESSMENT & PLAN NOTE
I reviewed blocks chest x-rays and CT scan on the PACS  She does have resolution of her symptoms but given her severe illness will order follow-up CT scan in April  If this is clear she needs no further follow-up from Pulmonary standpoint  We discussed all the possible post COVID complications particularly with regards to her lungs  I asked her to take the albuterol only at night before bed for a week if she notices no change in her symptoms she can discontinue the albuterol  If she notices she needs the albuterol more frequently than we would like to put her on maintenance inhaled therapy instead  Most likely she will no longer need any  Inhalers  We talked about the recommendation of getting the coronavirus was made available to her ninety days after her initial infection

## 2021-03-09 NOTE — PROGRESS NOTES
Assessment/Plan:    Pneumonia due to COVID-19 virus     I reviewed blocks chest x-rays and CT scan on the PACS  She does have resolution of her symptoms but given her severe illness will order follow-up CT scan in April  If this is clear she needs no further follow-up from Pulmonary standpoint  We discussed all the possible post COVID complications particularly with regards to her lungs  I asked her to take the albuterol only at night before bed for a week if she notices no change in her symptoms she can discontinue the albuterol  If she notices she needs the albuterol more frequently than we would like to put her on maintenance inhaled therapy instead  Most likely she will no longer need any  Inhalers  We talked about the recommendation of getting the coronavirus was made available to her ninety days after her initial infection  Acid reflux   I suggested she take Pepcid instead of omeprazole for maintenance therapy of her acid reflux  Diagnoses and all orders for this visit:    Essential hypertension    Hypoxia  -     Ambulatory referral to Pulmonology    Pneumonia due to COVID-19 virus  -     Ambulatory referral to Pulmonology  -     CT chest without contrast; Future    Acute respiratory failure with hypoxia ([facility] [address] )  -     Ambulatory referral to Pulmonology    Other orders  -     amoxicillin-clavulanate (AUGMENTIN) 875-125 mg per tablet; Take by mouth  -     hydrochlorothiazide (HYDRODIURIL) 25 mg tablet; Take 25 mg by mouth daily  -     fenofibrate (TRICOR) 145 mg tablet; Take 145 mg by mouth daily          Subjective:      Patient ID: Heladio Min is a 49 y o  female  Gordon Lone is here for follow-up of her hospitalization for COVID pneumonia and her hypoxic respiratory failure  She was discharged from the hospital after 5 days at the end of December on oxygen  She remain on oxygen for about a month until she had discontinued by her primary doctor a month ago    She feels she is gradually improving with regards to her breathing and in [place]  She takes the albuterol that was prescribed to her 2 to 3 times a day  She denies any wheezing or cough  She is able to go back to work and denies any significant respiratory compromise at this point  She denies any chest pain or leg swelling  She had a history of pneumonia once in her life but denies any other respiratory history, no asthma or emphysema  She is a lifelong nonsmoker  She has lived in this area aside from 5 years that she lives in [place]  She denies any pets or environmental exposures  The following portions of the patient's history were reviewed and updated as appropriate: allergies, current medications, past family history, past medical history, past social history, past surgical history and problem list     Review of Systems   Constitutional: Positive for chills and fatigue  Respiratory: Negative for shortness of breath  Objective:      BP (!) 174/110 (BP Location: Left arm, Patient Position: Sitting)   Pulse 74   Ht 5' 8" (1 727 m)   Wt 125 kg (276 lb)   SpO2 96%   BMI 41 97 kg/m²          Physical Exam  Constitutional:       Appearance: She is well-developed  HENT:      Head: Normocephalic  Eyes:      Pupils: Pupils are equal, round, and reactive to light  Neck:      Musculoskeletal: Normal range of motion and neck supple  Cardiovascular:      Rate and Rhythm: Normal rate  Heart sounds: No murmur  Pulmonary:      Effort: Pulmonary effort is normal  No respiratory distress  Breath sounds: Normal breath sounds  No wheezing or rales  Abdominal:      Palpations: Abdomen is soft  Musculoskeletal: Normal range of motion  Skin:     General: Skin is warm and dry  Neurological:      Mental Status: She is alert and oriented to person, place, and time

## 2021-03-10 DIAGNOSIS — Z23 ENCOUNTER FOR IMMUNIZATION: ICD-10-CM

## 2021-04-08 ENCOUNTER — HOSPITAL ENCOUNTER (OUTPATIENT)
Dept: CT IMAGING | Facility: HOSPITAL | Age: 51
Discharge: HOME | End: 2021-04-08
Attending: INTERNAL MEDICINE
Payer: COMMERCIAL

## 2021-04-08 DIAGNOSIS — U07.1 PNEUMONIA DUE TO COVID-19 VIRUS: ICD-10-CM

## 2021-04-08 DIAGNOSIS — J12.82 PNEUMONIA DUE TO COVID-19 VIRUS: ICD-10-CM

## 2021-04-08 PROCEDURE — G1004 CDSM NDSC: HCPCS

## 2021-04-08 PROCEDURE — 71250 CT THORAX DX C-: CPT

## 2021-04-13 ENCOUNTER — TELEPHONE (OUTPATIENT)
Dept: PULMONOLOGY | Facility: CLINIC | Age: 51
End: 2021-04-13

## 2021-04-14 ENCOUNTER — OFFICE VISIT (OUTPATIENT)
Dept: PULMONOLOGY | Facility: CLINIC | Age: 51
End: 2021-04-14
Payer: COMMERCIAL

## 2021-04-14 VITALS
OXYGEN SATURATION: 94 % | DIASTOLIC BLOOD PRESSURE: 80 MMHG | WEIGHT: 276.2 LBS | HEIGHT: 68 IN | HEART RATE: 85 BPM | BODY MASS INDEX: 41.86 KG/M2 | SYSTOLIC BLOOD PRESSURE: 148 MMHG

## 2021-04-14 DIAGNOSIS — R91.1 PULMONARY NODULE: ICD-10-CM

## 2021-04-14 DIAGNOSIS — U07.1 PNEUMONIA DUE TO COVID-19 VIRUS: Primary | ICD-10-CM

## 2021-04-14 DIAGNOSIS — J12.82 PNEUMONIA DUE TO COVID-19 VIRUS: Primary | ICD-10-CM

## 2021-04-14 PROCEDURE — 99214 OFFICE O/P EST MOD 30 MIN: CPT | Performed by: INTERNAL MEDICINE

## 2021-04-14 NOTE — PROGRESS NOTES
Pulmonary Follow Up Note   Keren Echeverria 49 y o  female MRN: [id number]  4/14/2021    Assessment:    COVID-19 pneumonia  · Appears to be resolved, no symptoms  · Follow-up CT chest showing resolution of the pulmonary infiltrates  · Received the COVID-19 vaccine few days ago  · No need for further treatment or follow-up from that standpoint    Pulmonary nodule  · 6 5 mm at the RUL  · No tobacco abuse history, has exposure to paper dust positive family history for lung cancer in father    Plan:   · Patient states that she had a CT chest done in 2012 at [facility], signed a release to obtain the images for comparison  · As per the criteria, will repeat CT chest in 612 months follow-up after the    Return in about 7 months (around 11/1/2021)  History of Present Illness   60-year-old female with a history of HTN, diverticulosis, phone Willebrand's disease, seasonal allergy  Patient was hospitalized for hypoxic respiratory failure secondary to COVID-19 pneumonia in 12/2020  She was evaluated by Pulmonary for hospital discharge follow-up last month  Appear to be stable  Recommended continue p r n  albuterol      Interval history  Follow-up CT chest 04/08/2021- noted improving mediastinal lymphadenopathy suggesting reactive etiology  Bilateral infiltrates of the lung parenchyma resolved  Noted 6 5 mm RUL nodule, recommended 6-12 months follow-up    She states that she feels back to normal, no respiratory symptoms and does not use the albuterol for more than 2 weeks  Currently with some rhinorrhea from noon seasonal allergies, take montelukast does not appear to help a lost an also has a Flonase  She is a lifelong nonsmoker, nonalcoholic  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, 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, 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  Positive family history for cancer in her father who was a smoker      Review of Systems  As per hpi, all other systems reviewed and were negative    Past medical, surgical, social and family histories reviewed  Medications/Allergies: Reviewed      Vitals: Blood pressure 148/80, pulse 85, height 5' 8" (1 727 m), weight 125 kg (276 lb 3 2 oz), SpO2 94 %  Body mass index is 42 kg/m²  Oxygen Therapy  SpO2: 94 %      Physical Exam  Body mass index is 42 kg/m²  Gen: not in acute distress  HEENT: supple, no adenopathy, PERRLA  Chest: normal respiratory efforts, clear breath sounds bilaterally  CV: RRR, no murmurs appreciated  Abdomen: soft, non tender, normal bowel sounds  Extremities: no edema  Skin: unremarkable  Neuro: AAO X3, no focal motor deficit      Labs:  Lab Results   Component Value Date    WBC 8 18 01/02/2021    HGB 12 7 01/02/2021    HCT 37 7 01/02/2021    MCV 85 01/02/2021     01/02/2021     Lab Results   Component Value Date    CALCIUM 9 2 01/26/2021    K 4 1 01/26/2021    CO2 29 01/26/2021     01/26/2021    BUN 12 01/26/2021    CREATININE 0 79 01/26/2021     No results found for: IGE  Lab Results   Component Value Date    ALT 23 01/01/2021    AST 21 01/01/2021    ALKPHOS 64 01/01/2021         Imaging and other studies: I have personally reviewed pertinent films in PACS      Pulmonary function testing:       EKG, Pathology, and Other Studies: I have personally reviewed pertinent reports  Portions of the record may have been created with voice recognition software  Occasional wrong word or "sound a like" substitutions may have occurred due to the inherent limitations of voice recognition software  Read the chart carefully and recognize, using context, where substitutions have occurred    MELISA Fuentes    [facility]

## 2021-04-29 ENCOUNTER — APPOINTMENT (OUTPATIENT)
Dept: LAB | Facility: HOSPITAL | Age: 51
End: 2021-04-29
Attending: INTERNAL MEDICINE
Payer: COMMERCIAL

## 2021-04-29 DIAGNOSIS — I10 ESSENTIAL HYPERTENSION: ICD-10-CM

## 2021-04-29 DIAGNOSIS — E87.6 HYPOKALEMIA: ICD-10-CM

## 2021-04-29 LAB
ANION GAP SERPL CALCULATED.3IONS-SCNC: 6 MMOL/L (ref 4–13)
BUN SERPL-MCNC: 8 MG/DL (ref 5–25)
CALCIUM SERPL-MCNC: 8.7 MG/DL (ref 8.3–10.1)
CHLORIDE SERPL-SCNC: 109 MMOL/L (ref 100–108)
CO2 SERPL-SCNC: 28 MMOL/L (ref 21–32)
CREAT SERPL-MCNC: 0.82 MG/DL (ref 0.6–1.3)
GFR SERPL CREATININE-BSD FRML MDRD: 84 ML/MIN/1.73SQ M
GLUCOSE P FAST SERPL-MCNC: 131 MG/DL (ref 65–99)
POTASSIUM SERPL-SCNC: 4.6 MMOL/L (ref 3.5–5.3)
SODIUM SERPL-SCNC: 143 MMOL/L (ref 136–145)

## 2021-04-29 PROCEDURE — 80048 BASIC METABOLIC PNL TOTAL CA: CPT

## 2021-04-29 PROCEDURE — 36415 COLL VENOUS BLD VENIPUNCTURE: CPT

## 2021-05-03 ENCOUNTER — OFFICE VISIT (OUTPATIENT)
Dept: CARDIOLOGY CLINIC | Facility: CLINIC | Age: 51
End: 2021-05-03
Payer: COMMERCIAL

## 2021-05-03 VITALS
BODY MASS INDEX: 41.98 KG/M2 | HEART RATE: 73 BPM | HEIGHT: 68 IN | DIASTOLIC BLOOD PRESSURE: 80 MMHG | WEIGHT: 277 LBS | SYSTOLIC BLOOD PRESSURE: 140 MMHG

## 2021-05-03 DIAGNOSIS — E78.1 HYPERTRIGLYCERIDEMIA: ICD-10-CM

## 2021-05-03 DIAGNOSIS — E66.01 CLASS 3 SEVERE OBESITY DUE TO EXCESS CALORIES WITH SERIOUS COMORBIDITY AND BODY MASS INDEX (BMI) OF 40.0 TO 44.9 IN ADULT (HCC): ICD-10-CM

## 2021-05-03 DIAGNOSIS — I10 ESSENTIAL HYPERTENSION: Primary | ICD-10-CM

## 2021-05-03 DIAGNOSIS — E11.9 NON-INSULIN DEPENDENT TYPE 2 DIABETES MELLITUS (HCC): ICD-10-CM

## 2021-05-03 PROCEDURE — 99213 OFFICE O/P EST LOW 20 MIN: CPT | Performed by: INTERNAL MEDICINE

## 2021-05-03 RX ORDER — HYDROCHLOROTHIAZIDE 25 MG/1
25 TABLET ORAL DAILY
Qty: 90 TABLET | Refills: 3 | Status: SHIPPED | OUTPATIENT
Start: 2021-05-03 | End: 2022-02-07

## 2021-05-03 NOTE — PROGRESS NOTES
Cardiology Office Visit    Antionette Morris  [id number]  1970     [facility]  [address] [place] [address] [phone]      Dear Dione Robles DO,    I had the pleasure of seeing your patient at our [address] [facility] [address] office today 5/3/2021  As you know she is a pleasant  female with a medical history as described below  Reason for office visit:  Follow-up hypertension  1  Essential hypertension  Assessment & Plan:  Patient with longstanding hypertension  Blood pressure is mildly elevated on exam  140/80  I had resumed her HCTZ 25 mg daily after labs were obtained after her last office visit  Labs stable 4/2021  Continue current medications without change  Patient was asked to monitor intermittently at home and to call if consistently > 521 systolic  2  Class 3 severe obesity due to excess calories with serious comorbidity and body mass index (BMI) of 40 0 to 44 9 in [facility])  Assessment & Plan: Body mass index is 42 12 kg/m²  We discussed the importance of diet, exercise and weight loss  3  Non-insulin dependent type 2 diabetes mellitus ([address] )  Assessment & Plan:  Previously poorly controlled with A1c of 10  Currently managed by PCP  4  Hypertriglyceridemia  -     Lipid panel; Future           HPI   Patient previously followed with Dr John Washburn who has since left the practice  Patient has a history of hypertension and has been on blood pressure medications since around the year 2000 (in her late 20's)  Echocardiogram 05/2019 showed normal LV function with an EF of 60% and grade 2 diastolic dysfunction  Family history of premature coronary artery disease in her father who had an MI in his 46s  Patient was last seen 09/16/2019 at which time she was doing well  1/26/2021:  Patient was noted to have [place] in December and was hospitalized in the ICU from 12/29/20-1/2/21    She was discharged home on her normal blood pressure medications with the exception of HCTZ being stopped  It appears that she was hypokalemic during her stay and I suspect this is why this was discontinued  She was discharged home on oxygen  She was also discharged home on steroids  She is currently only using oxygen if needed and admits that over the last few days she has not needed to use this  She is using an incentive spirometer at home  She does continue to have dyspnea on exertion  She does continue to have cough  She has returned to work  She denies any overt chest pain  5/3/2021: Patient presents for follow up  She is no longer on oxygen  She did have a repeat CT scan 4/8/2021 which showed resolved infiltrates and improved mediastinal adenopathy  Scattered lung nodules noted  9 month follow up was recommended       Patient Active Problem List   Diagnosis    Essential hypertension    Pneumonia due to COVID-19 virus    Morbid obesity ([facility] [address] )    Type 2 diabetes mellitus without complication, without long-term current use of insulin ([facility])    [facility]    Acid reflux    Hypertriglyceridemia    Multiple thyroid nodules    Pulmonary nodule     Past Medical History:   Diagnosis Date    COVID-19     Diverticulosis     Dry eye     Hypertension     Seasonal allergies     Thyroid nodule     Von Willebrand disease ([facility] )      Social History     Socioeconomic History    Marital status: Single     Spouse name: Not on file    Number of children: Not on file    Years of education: Not on file    Highest education level: Not on file   Occupational History    Not on file   Tobacco Use    Smoking status: Never Smoker    Smokeless tobacco: Never Used   Vaping Use    Vaping Use: Never used   Substance and Sexual Activity    Alcohol use: Not Currently     Comment: rare, holidays    Drug use: Never    Sexual activity: Not on file     Comment: Defer   Other Topics Concern    Not on file   Social History Narrative    Not on file     Social Determinants of Health     Financial Resource Strain: Not on file   Food Insecurity: Not on file   Transportation Needs: Not on file   Physical Activity: Not on file   Stress: Not on file   Social Connections: Not on file   Intimate Partner Violence: Not on file   Housing Stability: Not on file      Family History   Problem Relation Age of Onset    Hypertension Mother     Hyperlipidemia Mother     Diabetes type II Mother     Breast cancer Mother     Thyroid cancer Mother     Hypertension Father     Heart attack Father     Hyperlipidemia Father     Lung cancer Father     Hypertension Maternal Grandmother     Cancer Maternal Grandmother     No Known Problems Maternal Grandfather     No Known Problems Paternal Grandmother     No Known Problems Paternal Grandfather     Skin cancer Maternal Aunt     No Known Problems Paternal Aunt      Past Surgical History:   Procedure Laterality Date    ARTHROSCOPIC REPAIR ACL Left     CHOLECYSTECTOMY      HYSTERECTOMY  2010    partial hysterectomy    KNEE ARTHROSCOPY Right     REDUCTION MAMMAPLASTY Bilateral 2016       *Current medications reviewed  No Known Allergies      Cardiac Test Results:     Lipid panel 9/11/2019: C 174  T 299  H 36  L 78  Echocardiogram 05/17/2019:  EF 60%  Grade 2 diastolic dysfunction  Left atrial size upper limit of normal     Review of Systems:    Review of Systems   Constitutional: Positive for fatigue (improved)  Negative for activity change and appetite change  HENT: Negative for congestion, hearing loss, tinnitus and trouble swallowing  Eyes: Negative for visual disturbance  Respiratory: Positive for shortness of breath (improved)  Negative for cough, chest tightness and wheezing  Cardiovascular: Negative for chest pain, palpitations and leg swelling  Gastrointestinal: Negative for abdominal distention, abdominal pain, nausea and vomiting  Genitourinary: Negative for difficulty urinating  Musculoskeletal: Negative for arthralgias  Skin: Negative for rash  Neurological: Negative for dizziness, syncope and light-headedness  Hematological: Does not bruise/bleed easily  Psychiatric/Behavioral: Negative for confusion  The patient is not nervous/anxious  All other systems reviewed and are negative  Vitals:    05/03/21 1503 05/03/21 1543   BP: 126/72 140/80   BP Location: Left arm    Patient Position: Sitting    Pulse: 73    Weight: 126 kg (277 lb)    Height: 5' 8" (1 727 m)      Vitals:    05/03/21 1503   Weight: 126 kg (277 lb)     Height: 5' 8" (172 7 cm)     Body mass index is 42 12 kg/m²  Physical Exam   Constitutional: She is oriented to person, place, and time  She appears well-developed and well-nourished  HENT:   Head: Normocephalic and atraumatic  Eyes: Pupils are equal, round, and reactive to light  Conjunctivae are normal    Neck: Normal range of motion  No JVD present  Cardiovascular: Normal rate, regular rhythm and normal heart sounds  Exam reveals no gallop and no friction rub  No murmur heard  Pulmonary/Chest: Effort normal  She has decreased breath sounds in the left lower field  Abdominal: Soft  Bowel sounds are normal    Musculoskeletal:         General: No edema  Neurological: She is alert and oriented to person, place, and time  Skin: Skin is warm and dry  Psychiatric: She has a normal mood and affect  Her behavior is normal    Vitals reviewed

## 2021-05-03 NOTE — PATIENT INSTRUCTIONS
Recommend continuing current medications without change  Monitor blood pressure intermittently at home but always 1-2 hours after medications in the morning  I would recommend update lipid panel given significant triglyceride elevation noted during COVID

## 2021-07-13 DIAGNOSIS — R10.31 RIGHT LOWER QUADRANT PAIN: ICD-10-CM

## 2021-07-19 ENCOUNTER — HOSPITAL ENCOUNTER (OUTPATIENT)
Dept: CT IMAGING | Facility: HOSPITAL | Age: 51
Discharge: HOME | End: 2021-07-19
Attending: SURGERY
Payer: COMMERCIAL

## 2021-07-19 DIAGNOSIS — R10.31 RIGHT LOWER QUADRANT PAIN: ICD-10-CM

## 2021-07-19 PROCEDURE — 74177 CT ABD & PELVIS W/CONTRAST: CPT

## 2021-07-19 RX ADMIN — IOHEXOL 100 ML: 350 INJECTION, SOLUTION INTRAVENOUS at 08:03

## 2021-08-02 ENCOUNTER — TELEPHONE (OUTPATIENT)
Dept: CARDIOLOGY CLINIC | Facility: CLINIC | Age: 51
End: 2021-08-02

## 2021-10-18 ENCOUNTER — HOSPITAL ENCOUNTER (OUTPATIENT)
Dept: CT IMAGING | Facility: HOSPITAL | Age: 51
Discharge: HOME | End: 2021-10-18
Attending: INTERNAL MEDICINE
Payer: COMMERCIAL

## 2021-10-18 DIAGNOSIS — R91.1 PULMONARY NODULE: ICD-10-CM

## 2021-10-18 PROCEDURE — G1004 CDSM NDSC: HCPCS

## 2021-10-18 PROCEDURE — 71250 CT THORAX DX C-: CPT

## 2021-10-19 ENCOUNTER — APPOINTMENT (OUTPATIENT)
Dept: LAB | Facility: HOSPITAL | Age: 51
End: 2021-10-19
Attending: INTERNAL MEDICINE
Payer: COMMERCIAL

## 2021-10-19 ENCOUNTER — APPOINTMENT (OUTPATIENT)
Dept: LAB | Facility: HOSPITAL | Age: 51
End: 2021-10-19
Payer: COMMERCIAL

## 2021-10-19 DIAGNOSIS — E55.9 VITAMIN D DEFICIENCY DISEASE: Primary | ICD-10-CM

## 2021-10-19 DIAGNOSIS — E55.9 VITAMIN D DEFICIENCY DISEASE: ICD-10-CM

## 2021-10-19 DIAGNOSIS — E78.1 HYPERTRIGLYCERIDEMIA: ICD-10-CM

## 2021-10-19 DIAGNOSIS — E11.649 UNCONTROLLED TYPE 2 DIABETES MELLITUS WITH HYPOGLYCEMIA, UNSPECIFIED HYPOGLYCEMIA COMA STATUS (HCC): ICD-10-CM

## 2021-10-19 DIAGNOSIS — E83.42 HYPOMAGNESEMIA: ICD-10-CM

## 2021-10-19 DIAGNOSIS — E78.5 HYPERLIPIDEMIA, UNSPECIFIED HYPERLIPIDEMIA TYPE: ICD-10-CM

## 2021-10-19 DIAGNOSIS — I10 ESSENTIAL HYPERTENSION, MALIGNANT: ICD-10-CM

## 2021-10-19 LAB
25(OH)D3 SERPL-MCNC: 31.5 NG/ML (ref 30–100)
ALBUMIN SERPL BCP-MCNC: 3.4 G/DL (ref 3.5–5)
ALP SERPL-CCNC: 96 U/L (ref 46–116)
ALT SERPL W P-5'-P-CCNC: 34 U/L (ref 12–78)
ANION GAP SERPL CALCULATED.3IONS-SCNC: 8 MMOL/L (ref 4–13)
AST SERPL W P-5'-P-CCNC: 14 U/L (ref 5–45)
BASOPHILS # BLD AUTO: 0.07 THOUSANDS/ΜL (ref 0–0.1)
BASOPHILS NFR BLD AUTO: 1 % (ref 0–1)
BILIRUB SERPL-MCNC: 0.67 MG/DL (ref 0.2–1)
BUN SERPL-MCNC: 9 MG/DL (ref 5–25)
CALCIUM ALBUM COR SERPL-MCNC: 9.1 MG/DL (ref 8.3–10.1)
CALCIUM SERPL-MCNC: 8.6 MG/DL (ref 8.3–10.1)
CHLORIDE SERPL-SCNC: 102 MMOL/L (ref 100–108)
CHOLEST SERPL-MCNC: 191 MG/DL (ref 50–200)
CO2 SERPL-SCNC: 30 MMOL/L (ref 21–32)
CREAT SERPL-MCNC: 0.73 MG/DL (ref 0.6–1.3)
EOSINOPHIL # BLD AUTO: 0.22 THOUSAND/ΜL (ref 0–0.61)
EOSINOPHIL NFR BLD AUTO: 2 % (ref 0–6)
ERYTHROCYTE [DISTWIDTH] IN BLOOD BY AUTOMATED COUNT: 13.4 % (ref 11.6–15.1)
EST. AVERAGE GLUCOSE BLD GHB EST-MCNC: 131 MG/DL
GFR SERPL CREATININE-BSD FRML MDRD: 96 ML/MIN/1.73SQ M
GLUCOSE P FAST SERPL-MCNC: 114 MG/DL (ref 65–99)
HBA1C MFR BLD: 6.2 %
HCT VFR BLD AUTO: 41.2 % (ref 34.8–46.1)
HDLC SERPL-MCNC: 46 MG/DL
HGB BLD-MCNC: 13.8 G/DL (ref 11.5–15.4)
IMM GRANULOCYTES # BLD AUTO: 0.26 THOUSAND/UL (ref 0–0.2)
IMM GRANULOCYTES NFR BLD AUTO: 2 % (ref 0–2)
LDLC SERPL CALC-MCNC: 91 MG/DL (ref 0–100)
LYMPHOCYTES # BLD AUTO: 2.81 THOUSANDS/ΜL (ref 0.6–4.47)
LYMPHOCYTES NFR BLD AUTO: 20 % (ref 14–44)
MAGNESIUM SERPL-MCNC: 1.8 MG/DL (ref 1.6–2.6)
MCH RBC QN AUTO: 29.7 PG (ref 26.8–34.3)
MCHC RBC AUTO-ENTMCNC: 33.5 G/DL (ref 31.4–37.4)
MCV RBC AUTO: 89 FL (ref 82–98)
MONOCYTES # BLD AUTO: 0.75 THOUSAND/ΜL (ref 0.17–1.22)
MONOCYTES NFR BLD AUTO: 5 % (ref 4–12)
NEUTROPHILS # BLD AUTO: 10.26 THOUSANDS/ΜL (ref 1.85–7.62)
NEUTS SEG NFR BLD AUTO: 70 % (ref 43–75)
NONHDLC SERPL-MCNC: 145 MG/DL
NRBC BLD AUTO-RTO: 0 /100 WBCS
PLATELET # BLD AUTO: 341 THOUSANDS/UL (ref 149–390)
PMV BLD AUTO: 11.1 FL (ref 8.9–12.7)
POTASSIUM SERPL-SCNC: 4.3 MMOL/L (ref 3.5–5.3)
PROT SERPL-MCNC: 6.8 G/DL (ref 6.4–8.2)
RBC # BLD AUTO: 4.65 MILLION/UL (ref 3.81–5.12)
SODIUM SERPL-SCNC: 140 MMOL/L (ref 136–145)
TRIGL SERPL-MCNC: 272 MG/DL
WBC # BLD AUTO: 14.37 THOUSAND/UL (ref 4.31–10.16)

## 2021-10-19 PROCEDURE — 83735 ASSAY OF MAGNESIUM: CPT

## 2021-10-19 PROCEDURE — 82306 VITAMIN D 25 HYDROXY: CPT

## 2021-10-19 PROCEDURE — 80053 COMPREHEN METABOLIC PANEL: CPT

## 2021-10-19 PROCEDURE — 36415 COLL VENOUS BLD VENIPUNCTURE: CPT

## 2021-10-19 PROCEDURE — 85025 COMPLETE CBC W/AUTO DIFF WBC: CPT

## 2021-10-19 PROCEDURE — 80061 LIPID PANEL: CPT

## 2021-10-19 PROCEDURE — 83036 HEMOGLOBIN GLYCOSYLATED A1C: CPT

## 2021-10-27 ENCOUNTER — TELEPHONE (OUTPATIENT)
Dept: PULMONOLOGY | Facility: CLINIC | Age: 51
End: 2021-10-27

## 2021-10-28 ENCOUNTER — TELEPHONE (OUTPATIENT)
Dept: ADMINISTRATIVE | Facility: OTHER | Age: 51
End: 2021-10-28

## 2021-10-28 ENCOUNTER — OFFICE VISIT (OUTPATIENT)
Dept: ENDOCRINOLOGY | Facility: CLINIC | Age: 51
End: 2021-10-28
Payer: COMMERCIAL

## 2021-10-28 VITALS
HEIGHT: 68 IN | HEART RATE: 69 BPM | SYSTOLIC BLOOD PRESSURE: 130 MMHG | BODY MASS INDEX: 42.19 KG/M2 | WEIGHT: 278.4 LBS | DIASTOLIC BLOOD PRESSURE: 82 MMHG

## 2021-10-28 DIAGNOSIS — E66.01 CLASS 3 SEVERE OBESITY DUE TO EXCESS CALORIES WITH SERIOUS COMORBIDITY AND BODY MASS INDEX (BMI) OF 40.0 TO 44.9 IN ADULT (HCC): ICD-10-CM

## 2021-10-28 DIAGNOSIS — E04.2 MULTIPLE THYROID NODULES: ICD-10-CM

## 2021-10-28 DIAGNOSIS — E78.1 HYPERTRIGLYCERIDEMIA: ICD-10-CM

## 2021-10-28 DIAGNOSIS — I10 ESSENTIAL HYPERTENSION: ICD-10-CM

## 2021-10-28 DIAGNOSIS — E11.9 NON-INSULIN DEPENDENT TYPE 2 DIABETES MELLITUS (HCC): Primary | ICD-10-CM

## 2021-10-28 PROCEDURE — 99244 OFF/OP CNSLTJ NEW/EST MOD 40: CPT | Performed by: STUDENT IN AN ORGANIZED HEALTH CARE EDUCATION/TRAINING PROGRAM

## 2021-10-28 RX ORDER — SEMAGLUTIDE 1.34 MG/ML
1 INJECTION, SOLUTION SUBCUTANEOUS WEEKLY
Qty: 9 ML | Refills: 1 | Status: SHIPPED | OUTPATIENT
Start: 2021-10-28 | End: 2021-10-29

## 2021-10-28 RX ORDER — ATORVASTATIN CALCIUM 10 MG/1
10 TABLET, FILM COATED ORAL DAILY
Qty: 90 TABLET | Refills: 1 | Status: SHIPPED | OUTPATIENT
Start: 2021-10-28 | End: 2022-04-27 | Stop reason: SDUPTHER

## 2021-10-29 DIAGNOSIS — E11.9 NON-INSULIN DEPENDENT TYPE 2 DIABETES MELLITUS (HCC): ICD-10-CM

## 2021-10-29 RX ORDER — SEMAGLUTIDE 1.34 MG/ML
1 INJECTION, SOLUTION SUBCUTANEOUS WEEKLY
Qty: 9 ML | Refills: 1 | Status: SHIPPED | OUTPATIENT
Start: 2021-10-29 | End: 2021-12-21 | Stop reason: SDUPTHER

## 2021-11-05 ENCOUNTER — TELEPHONE (OUTPATIENT)
Dept: DIABETES SERVICES | Facility: CLINIC | Age: 51
End: 2021-11-05

## 2021-11-10 ENCOUNTER — TELEPHONE (OUTPATIENT)
Dept: DIABETES SERVICES | Facility: CLINIC | Age: 51
End: 2021-11-10

## 2021-11-12 ENCOUNTER — TELEPHONE (OUTPATIENT)
Dept: DIABETES SERVICES | Facility: CLINIC | Age: 51
End: 2021-11-12

## 2021-11-30 ENCOUNTER — OFFICE VISIT (OUTPATIENT)
Dept: PULMONOLOGY | Facility: CLINIC | Age: 51
End: 2021-11-30
Payer: COMMERCIAL

## 2021-11-30 VITALS
HEART RATE: 82 BPM | DIASTOLIC BLOOD PRESSURE: 80 MMHG | BODY MASS INDEX: 41.98 KG/M2 | WEIGHT: 277 LBS | OXYGEN SATURATION: 96 % | SYSTOLIC BLOOD PRESSURE: 118 MMHG | HEIGHT: 68 IN

## 2021-11-30 DIAGNOSIS — J12.82 PNEUMONIA DUE TO COVID-19 VIRUS: Primary | ICD-10-CM

## 2021-11-30 DIAGNOSIS — R91.8 PULMONARY NODULES: ICD-10-CM

## 2021-11-30 DIAGNOSIS — E66.01 MORBID OBESITY (HCC): ICD-10-CM

## 2021-11-30 DIAGNOSIS — U07.1 PNEUMONIA DUE TO COVID-19 VIRUS: Primary | ICD-10-CM

## 2021-11-30 PROBLEM — R91.1 PULMONARY NODULE: Status: ACTIVE | Noted: 2021-11-30

## 2021-11-30 PROCEDURE — 99214 OFFICE O/P EST MOD 30 MIN: CPT | Performed by: INTERNAL MEDICINE

## 2021-12-01 ENCOUNTER — OFFICE VISIT (OUTPATIENT)
Dept: CARDIOLOGY CLINIC | Facility: CLINIC | Age: 51
End: 2021-12-01
Payer: COMMERCIAL

## 2021-12-01 VITALS
HEIGHT: 68 IN | WEIGHT: 276 LBS | BODY MASS INDEX: 41.83 KG/M2 | OXYGEN SATURATION: 93 % | HEART RATE: 83 BPM | SYSTOLIC BLOOD PRESSURE: 114 MMHG | DIASTOLIC BLOOD PRESSURE: 80 MMHG

## 2021-12-01 DIAGNOSIS — E78.1 HYPERTRIGLYCERIDEMIA: ICD-10-CM

## 2021-12-01 DIAGNOSIS — E11.9 NON-INSULIN DEPENDENT TYPE 2 DIABETES MELLITUS (HCC): ICD-10-CM

## 2021-12-01 DIAGNOSIS — I10 ESSENTIAL HYPERTENSION: Primary | ICD-10-CM

## 2021-12-01 DIAGNOSIS — E66.01 MORBID OBESITY (HCC): ICD-10-CM

## 2021-12-01 PROCEDURE — 99214 OFFICE O/P EST MOD 30 MIN: CPT | Performed by: NURSE PRACTITIONER

## 2021-12-01 RX ORDER — LISINOPRIL 10 MG/1
10 TABLET ORAL DAILY
Qty: 90 TABLET | Refills: 3 | Status: SHIPPED | OUTPATIENT
Start: 2021-12-01

## 2021-12-01 RX ORDER — AMLODIPINE BESYLATE 10 MG/1
10 TABLET ORAL DAILY
Qty: 90 TABLET | Refills: 3 | Status: SHIPPED | OUTPATIENT
Start: 2021-12-01

## 2021-12-01 RX ORDER — METOPROLOL TARTRATE 50 MG/1
50 TABLET, FILM COATED ORAL 2 TIMES DAILY
Qty: 180 TABLET | Refills: 3 | Status: SHIPPED | OUTPATIENT
Start: 2021-12-01

## 2021-12-15 ENCOUNTER — PATIENT MESSAGE (OUTPATIENT)
Dept: ENDOCRINOLOGY | Facility: CLINIC | Age: 51
End: 2021-12-15

## 2021-12-15 DIAGNOSIS — E11.9 NON-INSULIN DEPENDENT TYPE 2 DIABETES MELLITUS (HCC): ICD-10-CM

## 2021-12-21 DIAGNOSIS — E11.9 NON-INSULIN DEPENDENT TYPE 2 DIABETES MELLITUS (HCC): ICD-10-CM

## 2021-12-21 RX ORDER — SEMAGLUTIDE 1.34 MG/ML
1 INJECTION, SOLUTION SUBCUTANEOUS WEEKLY
Qty: 9 ML | Refills: 1 | Status: SHIPPED | OUTPATIENT
Start: 2021-12-21 | End: 2021-12-27 | Stop reason: SDUPTHER

## 2021-12-22 ENCOUNTER — TELEPHONE (OUTPATIENT)
Dept: ENDOCRINOLOGY | Facility: CLINIC | Age: 51
End: 2021-12-22

## 2021-12-27 ENCOUNTER — TELEPHONE (OUTPATIENT)
Dept: ENDOCRINOLOGY | Facility: CLINIC | Age: 51
End: 2021-12-27

## 2021-12-28 ENCOUNTER — TELEPHONE (OUTPATIENT)
Dept: ENDOCRINOLOGY | Facility: CLINIC | Age: 51
End: 2021-12-28

## 2021-12-28 RX ORDER — SEMAGLUTIDE 1.34 MG/ML
1 INJECTION, SOLUTION SUBCUTANEOUS WEEKLY
Qty: 0.75 ML | Refills: 1 | Status: SHIPPED | OUTPATIENT
Start: 2021-12-28 | End: 2022-01-13 | Stop reason: DRUGHIGH

## 2021-12-29 ENCOUNTER — PATIENT MESSAGE (OUTPATIENT)
Dept: ENDOCRINOLOGY | Facility: CLINIC | Age: 51
End: 2021-12-29

## 2021-12-29 DIAGNOSIS — E11.9 NON-INSULIN DEPENDENT TYPE 2 DIABETES MELLITUS (HCC): Primary | ICD-10-CM

## 2022-01-07 ENCOUNTER — OFFICE VISIT (OUTPATIENT)
Dept: ENDOCRINOLOGY | Facility: OTHER | Age: 52
End: 2022-01-07
Payer: COMMERCIAL

## 2022-01-07 VITALS — HEIGHT: 68 IN | BODY MASS INDEX: 43.35 KG/M2 | WEIGHT: 286 LBS

## 2022-01-07 DIAGNOSIS — E11.9 NON-INSULIN DEPENDENT TYPE 2 DIABETES MELLITUS (HCC): Primary | ICD-10-CM

## 2022-01-07 PROCEDURE — 97802 MEDICAL NUTRITION INDIV IN: CPT | Performed by: DIETITIAN, REGISTERED

## 2022-01-07 NOTE — PROGRESS NOTES
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  Her diet is inconsistent but she does try to get vegetables, fruits, and fish in her diet regularly  Few whole grains and diet is relatively high in sodium  Currently meals range from 0 to 60 grams of carbohydrate  Explained basic pathophysiology of diabetes and impact of diet on blood glucose levels  Together we discussed what foods contain CHO, reading a food label, and serving sizes  Used the portion booklet to teach Vaughn Soto more about food groups and basic carbohydrate counting  Created an individualized meal plan for Vaughn Soto with 3 meals and 2-3 snacks providing 45 g carb per meal and 15 g carb per snack  This plan will help promote weight loss  Put together sample meals for Chary's reference  Vaughn Soto demonstrated good understanding, Vaughn Soto will call with questions or for more education  Ht Readings from Last 1 Encounters:   01/07/22 5' 8" (1 727 m)     Wt Readings from Last 2 Encounters:   01/07/22 130 kg (286 lb)   12/01/21 125 kg (276 lb)     Weight Change: Yes +10#    Medical Diagnosis/ICD10: E11 9    Barriers to Learning: no barriers    Do you follow any special diet presently?: Yes - at the beginning, until Thanksgiving  Who shops: patient and mother  Who cooks: patient and mother    Food Log: Completed via the method of food recall    Breakfast:today had a piece of toast and 1 packet fruit flavored oatmeal, 1 coffee w/ sugar free creamer   Sleeps in if no appointment  If worked overnight, gets home 5:30-6:30, may have an oatmeal or a piece of toast or nothing, sleeps until 2: 30-3  Morning Snack:usually is sleeping  Lunch:12-1 pm, toasted cheese sandwich on white bread and tomato soup, or soup and crackers OR leftovers like pot pie w/ bowties or small veggie lasagna  Afternoon Snack: not much  Dinner:salmon or other seafood 2 times per week, grilled veggies and a salad  Evening Snack:at work, doesn't take official break  Eats in front of computer  Packs a salad or some soup, turkey pepperoni, string cheese, piece of fruit or applesauce cup, little cup of olives, occasional bag of chips in cafeteria or other snacks, usually nothing after 3-4 am  At home, snacking a lot  Beverages: coffee, unsweetened iced tea, Coke Zero once a day, water  Eating out/Take out:more than she should   Eats out when they've been out shopping, brings food home often  Exercise 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 work dates, 5000 steps    Calorie needs 1700 kcals/day Carbs: 45g/meal, 15g/snack         Nutrition Diagnosis:  Inconsistent carbohydrate intake  intake related to Physiological causes requiring careful timing and consistency in the amount of carbohydrate (i e  diabetes mellitus, hypoglycemia) as evidenced by  Estimated carbohydrate intake that is different from recommended types or ingested on an irregular basis    Intervention: carbohydrate counting, meal timing, meal planning, individualized meal plan, monitoring portion control, exercise guidelines, food diary and low sodium diet     Treatment Goals: Patient will monitor portion control, Patient will count carbohydrates and reduce sodium intake    Monitoring and evaluation:    Term code indicator  FH 1 6 3 Carbohydrate Intake Criteria: Follow meal plan  Term code indicator  FH 4 4 Mealtime Behavior Criteria: Keep 4-5 waking hours between meals and 2-3 hours between meals and snacks  Term code indicator  FH 1 7 2 Mineral/Element Intake Criteria: Choose lower sodium foods    Patients Response to Instruction:  Radha Granados  Expected Compliancegood    Thank you for coming to the [facility] for education today  Please feel free to call with any questions or concerns      Ottoniel Marshall  [phone] [phone]

## 2022-01-07 NOTE — PATIENT INSTRUCTIONS
1  Follow meal plan, aim for 30-45 g carb per meal  2  Increase physical activity  3   Keep 3 day food diary before next visit

## 2022-01-08 ENCOUNTER — APPOINTMENT (OUTPATIENT)
Dept: LAB | Facility: HOSPITAL | Age: 52
End: 2022-01-08
Payer: COMMERCIAL

## 2022-01-08 DIAGNOSIS — E04.2 MULTIPLE THYROID NODULES: ICD-10-CM

## 2022-01-08 DIAGNOSIS — E11.9 NON-INSULIN DEPENDENT TYPE 2 DIABETES MELLITUS (HCC): ICD-10-CM

## 2022-01-08 LAB
ANION GAP SERPL CALCULATED.3IONS-SCNC: 6 MMOL/L (ref 4–13)
BUN SERPL-MCNC: 6 MG/DL (ref 5–25)
CALCIUM SERPL-MCNC: 8.7 MG/DL (ref 8.3–10.1)
CHLORIDE SERPL-SCNC: 103 MMOL/L (ref 100–108)
CHOLEST SERPL-MCNC: 141 MG/DL
CO2 SERPL-SCNC: 29 MMOL/L (ref 21–32)
CREAT SERPL-MCNC: 0.69 MG/DL (ref 0.6–1.3)
EST. AVERAGE GLUCOSE BLD GHB EST-MCNC: 160 MG/DL
GFR SERPL CREATININE-BSD FRML MDRD: 101 ML/MIN/1.73SQ M
GLUCOSE P FAST SERPL-MCNC: 139 MG/DL (ref 65–99)
HBA1C MFR BLD: 7.2 %
HDLC SERPL-MCNC: 43 MG/DL
LDLC SERPL CALC-MCNC: 53 MG/DL (ref 0–100)
POTASSIUM SERPL-SCNC: 4.1 MMOL/L (ref 3.5–5.3)
SODIUM SERPL-SCNC: 138 MMOL/L (ref 136–145)
T4 FREE SERPL-MCNC: 1.05 NG/DL (ref 0.76–1.46)
TRIGL SERPL-MCNC: 227 MG/DL
TSH SERPL DL<=0.05 MIU/L-ACNC: 2.64 UIU/ML (ref 0.36–3.74)

## 2022-01-08 PROCEDURE — 80048 BASIC METABOLIC PNL TOTAL CA: CPT

## 2022-01-08 PROCEDURE — 80061 LIPID PANEL: CPT

## 2022-01-08 PROCEDURE — 36415 COLL VENOUS BLD VENIPUNCTURE: CPT

## 2022-01-08 PROCEDURE — 84439 ASSAY OF FREE THYROXINE: CPT

## 2022-01-08 PROCEDURE — 83036 HEMOGLOBIN GLYCOSYLATED A1C: CPT

## 2022-01-08 PROCEDURE — 84443 ASSAY THYROID STIM HORMONE: CPT

## 2022-01-13 ENCOUNTER — OFFICE VISIT (OUTPATIENT)
Dept: ENDOCRINOLOGY | Facility: CLINIC | Age: 52
End: 2022-01-13
Payer: COMMERCIAL

## 2022-01-13 VITALS
BODY MASS INDEX: 42.56 KG/M2 | HEIGHT: 68 IN | SYSTOLIC BLOOD PRESSURE: 100 MMHG | WEIGHT: 280.8 LBS | HEART RATE: 88 BPM | DIASTOLIC BLOOD PRESSURE: 82 MMHG

## 2022-01-13 DIAGNOSIS — E78.1 HYPERTRIGLYCERIDEMIA: ICD-10-CM

## 2022-01-13 DIAGNOSIS — E04.2 MULTIPLE THYROID NODULES: ICD-10-CM

## 2022-01-13 DIAGNOSIS — E11.9 TYPE 2 DIABETES MELLITUS WITHOUT COMPLICATION, WITHOUT LONG-TERM CURRENT USE OF INSULIN (HCC): ICD-10-CM

## 2022-01-13 DIAGNOSIS — E11.9 TYPE 2 DIABETES MELLITUS WITHOUT COMPLICATION, WITHOUT LONG-TERM CURRENT USE OF INSULIN (HCC): Primary | ICD-10-CM

## 2022-01-13 DIAGNOSIS — I10 ESSENTIAL HYPERTENSION: ICD-10-CM

## 2022-01-13 DIAGNOSIS — E11.9 NON-INSULIN DEPENDENT TYPE 2 DIABETES MELLITUS (HCC): ICD-10-CM

## 2022-01-13 PROCEDURE — 99214 OFFICE O/P EST MOD 30 MIN: CPT | Performed by: STUDENT IN AN ORGANIZED HEALTH CARE EDUCATION/TRAINING PROGRAM

## 2022-01-13 RX ORDER — SEMAGLUTIDE 1.34 MG/ML
0.5 INJECTION, SOLUTION SUBCUTANEOUS WEEKLY
Qty: 1.5 ML | Refills: 3 | Status: SHIPPED | OUTPATIENT
Start: 2022-01-13 | End: 2022-02-07

## 2022-01-13 NOTE — ASSESSMENT & PLAN NOTE
Currently on lipitor 10 mg daily  Triglycerides uncontrolled  In review of chart, may have previously been on fenofibrate but unsure if still active with patient  Will plan to follow up lipids in future  Can consider re-initiation of fibrates (if not active) +/- omega-3 FA

## 2022-01-13 NOTE — PROGRESS NOTES
Trinidad Babb 49 y o  female MRN: [id number]    Encounter: [id number]      Assessment/Plan     Problem List Items Addressed This Visit        Endocrine    Type 2 diabetes mellitus without complication, without long-term current use of insulin ([facility] [address] ) - Primary     Diabetes is worsening by A1c, but overall in reasonable control  She would benefit from GLP1 for diabetes control and weight loss  She was previously intolerant of metformin due to GI symptoms including abdominal discomfort and diarrhea  Will continue glipizide 5 mg bid for time being  Continue to follow with CDE for MNT counseling  Should check daily fingersticks and bring meter/log to future visits  Labs prior to next visit         Relevant Medications    Semaglutide,0 25 or 0 5MG/DOS, (Ozempic, 0 25 or 0 5 MG/DOSE,) 2 MG/1 5ML SOPN    Multiple thyroid nodules     Follow up thyroid ultrasound            Cardiovascular and Mediastinum    Essential hypertension     Controlled  Continue with present therapy  Other    Hypertriglyceridemia     Currently on lipitor 10 mg daily  Triglycerides uncontrolled  In review of chart, may have previously been on fenofibrate but unsure if still active with patient  Will plan to follow up lipids in future  Can consider re-initiation of fibrates (if not active) +/- omega-3 FA  CC: Diabetes, thyroid nodules    History of Present Illness     HPI:    Woody Jona returns for follow-up of diabetes and thyroid nodules  Prep is presently taking glipizide 5 mg b i d  and Ozempic  Prescribed Ozempic at the last visit and she was started on a sample pen which she tolerated well  She had increased the dose to 0 5 mg weekly and was tolerating this dose before the sample finished  She wasn't able to fill scripts for ozempic at her pharmacy due to insurance  She was provided an additional sample pen continue therapy, which she continues to use    She has a previous experience using metformin but this medication caused her diarrhea and upset stomach  Her symptoms resolved with stopping metformin therapy  In place of metformin, she was started on januvia, which she is no longer taking on account of starting ozempic  She has no BG for review today  She denies any hyperglycemic or hypoglycemic symptoms  Mahayde Bora states plan to update thyroid ultrasound  She has the requested order  Review of Systems   Constitutional: Negative for fatigue, fever and unexpected weight change  HENT: Negative for trouble swallowing and voice change  Eyes: Negative for photophobia and visual disturbance  Respiratory: Negative for cough and shortness of breath  Cardiovascular: Negative for chest pain and leg swelling  Gastrointestinal: Negative for abdominal pain, constipation and diarrhea  Endocrine: Negative for polydipsia, polyphagia and polyuria  Skin: Negative for color change and pallor  Neurological: Negative for weakness and headaches  Hematological: Negative for adenopathy  Does not bruise/bleed easily  Psychiatric/Behavioral: Negative for sleep disturbance  The patient is not nervous/anxious          Historical Information   Past Medical History:   Diagnosis Date    COVID-19     Diverticulosis     Dry eye     Hypertension     Seasonal allergies     Thyroid nodule     Von Willebrand disease ([address] )      Past Surgical History:   Procedure Laterality Date    ARTHROSCOPIC REPAIR ACL Left     CHOLECYSTECTOMY      HYSTERECTOMY  2010    partial hysterectomy    KNEE ARTHROSCOPY Right     REDUCTION MAMMAPLASTY Bilateral 2016     Social History   Social History     Substance and Sexual Activity   Alcohol Use Not Currently    Comment: rare, holidays     Social History     Substance and Sexual Activity   Drug Use Never     Social History     Tobacco Use   Smoking Status Never Smoker   Smokeless Tobacco Never Used     Family History:   Family History   Problem Relation Age of Onset    Hypertension Mother    Jacqueline Quintero Hyperlipidemia Mother     Diabetes type II Mother     Breast cancer Mother     Thyroid cancer Mother     Hypertension Father     Heart attack Father     Hyperlipidemia Father     Lung cancer Father     Hypertension Maternal Grandmother     Cancer Maternal Grandmother     No Known Problems Maternal Grandfather     No Known Problems Paternal Grandmother     No Known Problems Paternal Grandfather     Skin cancer Maternal Aunt     No Known Problems Paternal Aunt        Meds/Allergies   Current Outpatient Medications   Medication Sig Dispense Refill    amLODIPine (NORVASC) 10 mg tablet Take 1 tablet (10 mg total) by mouth daily 90 tablet 3    atorvastatin (LIPITOR) 10 mg tablet Take 1 tablet (10 mg total) by mouth daily 90 tablet 1    cholecalciferol (VITAMIN D3) 1,000 units tablet Take 2 tablets (2,000 Units total) by mouth daily (Patient taking differently: Take 1,000 Units by mouth daily ) 60 tablet 0    cycloSPORINE (RESTASIS) 0 05 % ophthalmic emulsion 1 drop 2 (two) times a day      glipiZIDE (GLUCOTROL) 5 mg tablet 5 mg 2 (two) times a day      hydrochlorothiazide (HYDRODIURIL) 25 mg tablet Take 1 tablet (25 mg total) by mouth daily 90 tablet 3    lisinopril (ZESTRIL) 10 mg tablet Take 1 tablet (10 mg total) by mouth daily 90 tablet 3    metoprolol tartrate (LOPRESSOR) 50 mg tablet Take 1 tablet (50 mg total) by mouth 2 (two) times a day 180 tablet 3    fluticasone (FLONASE) 50 mcg/act nasal spray   (Patient not taking: Reported on 1/13/2022 )  4    Semaglutide,0 25 or 0 5MG/DOS, (Ozempic, 0 25 or 0 5 MG/DOSE,) 2 MG/1 5ML SOPN Inject 0 5 mg under the skin once a week 1 5 mL 3     No current facility-administered medications for this visit  No Known Allergies    Objective   Vitals: Blood pressure 100/82, pulse 88, height 5' 8" (1 727 m), weight 127 kg (280 lb 12 8 oz)  Physical Exam  Vitals reviewed  Constitutional:       Appearance: She is not diaphoretic     HENT:      Head: Normocephalic and atraumatic  Nose: Nose normal    Eyes:      General: No scleral icterus  Cardiovascular:      Rate and Rhythm: Normal rate and regular rhythm  Pulmonary:      Effort: Pulmonary effort is normal  No respiratory distress  Abdominal:      Palpations: Abdomen is soft  Musculoskeletal:      Right lower leg: No edema  Left lower leg: No edema  Skin:     General: Skin is warm and dry  Neurological:      General: No focal deficit present  Mental Status: She is alert  Psychiatric:         Mood and Affect: Mood normal          Behavior: Behavior normal          The history was obtained from the review of the chart, patient and family  Lab Results:   Lab Results   Component Value Date/Time    Hemoglobin A1C 7 2 (H) 01/08/2022 10:14 AM    Hemoglobin A1C 6 2 (H) 10/19/2021 03:03 PM    WBC 14 37 (H) 10/19/2021 03:03 PM    Hemoglobin 13 8 10/19/2021 03:03 PM    Hematocrit 41 2 10/19/2021 03:03 PM    MCV 89 10/19/2021 03:03 PM    Platelets 009 27/35/4764 03:03 PM    BUN 6 01/08/2022 10:14 AM    BUN 9 10/19/2021 03:03 PM    BUN 8 04/29/2021 10:44 AM    Potassium 4 1 01/08/2022 10:14 AM    Potassium 4 3 10/19/2021 03:03 PM    Potassium 4 6 04/29/2021 10:44 AM    Chloride 103 01/08/2022 10:14 AM    Chloride 102 10/19/2021 03:03 PM    Chloride 109 (H) 04/29/2021 10:44 AM    CO2 29 01/08/2022 10:14 AM    CO2 30 10/19/2021 03:03 PM    CO2 28 04/29/2021 10:44 AM    Creatinine 0 69 01/08/2022 10:14 AM    Creatinine 0 73 10/19/2021 03:03 PM    Creatinine 0 82 04/29/2021 10:44 AM    AST 14 10/19/2021 03:03 PM    ALT 34 10/19/2021 03:03 PM    Albumin 3 4 (L) 10/19/2021 03:03 PM    HDL, Direct 43 (L) 01/08/2022 10:14 AM    HDL, Direct 46 10/19/2021 03:03 PM    Triglycerides 227 (H) 01/08/2022 10:14 AM    Triglycerides 272 (H) 10/19/2021 03:03 PM           Imaging Studies: I have personally reviewed pertinent reports        Portions of the record may have been created with voice recognition software  Occasional wrong word or "sound a like" substitutions may have occurred due to the inherent limitations of voice recognition software  Read the chart carefully and recognize, using context, where substitutions have occurred

## 2022-01-28 RX ORDER — SEMAGLUTIDE 1.34 MG/ML
0.5 INJECTION, SOLUTION SUBCUTANEOUS WEEKLY
Refills: 3 | OUTPATIENT
Start: 2022-01-28

## 2022-02-07 DIAGNOSIS — I10 ESSENTIAL HYPERTENSION: ICD-10-CM

## 2022-02-07 RX ORDER — SEMAGLUTIDE 1.34 MG/ML
0.5 INJECTION, SOLUTION SUBCUTANEOUS WEEKLY
Qty: 1.5 ML | Refills: 3 | Status: SHIPPED | OUTPATIENT
Start: 2022-02-07 | End: 2022-04-27 | Stop reason: SDUPTHER

## 2022-02-07 RX ORDER — HYDROCHLOROTHIAZIDE 25 MG/1
TABLET ORAL
Qty: 90 TABLET | Refills: 3 | Status: SHIPPED | OUTPATIENT
Start: 2022-02-07

## 2022-04-15 ENCOUNTER — APPOINTMENT (OUTPATIENT)
Dept: LAB | Facility: HOSPITAL | Age: 52
End: 2022-04-15
Payer: COMMERCIAL

## 2022-04-15 DIAGNOSIS — E11.9 NON-INSULIN DEPENDENT TYPE 2 DIABETES MELLITUS (HCC): ICD-10-CM

## 2022-04-15 LAB
ANION GAP SERPL CALCULATED.3IONS-SCNC: 7 MMOL/L (ref 4–13)
BUN SERPL-MCNC: 7 MG/DL (ref 5–25)
CALCIUM SERPL-MCNC: 8.3 MG/DL (ref 8.3–10.1)
CHLORIDE SERPL-SCNC: 105 MMOL/L (ref 100–108)
CO2 SERPL-SCNC: 27 MMOL/L (ref 21–32)
CREAT SERPL-MCNC: 0.69 MG/DL (ref 0.6–1.3)
CREAT UR-MCNC: 249 MG/DL
EST. AVERAGE GLUCOSE BLD GHB EST-MCNC: 148 MG/DL
GFR SERPL CREATININE-BSD FRML MDRD: 101 ML/MIN/1.73SQ M
GLUCOSE P FAST SERPL-MCNC: 91 MG/DL (ref 65–99)
HBA1C MFR BLD: 6.8 %
MICROALBUMIN UR-MCNC: 13.3 MG/L (ref 0–20)
MICROALBUMIN/CREAT 24H UR: 5 MG/G CREATININE (ref 0–30)
POTASSIUM SERPL-SCNC: 3.8 MMOL/L (ref 3.5–5.3)
SODIUM SERPL-SCNC: 139 MMOL/L (ref 136–145)

## 2022-04-15 PROCEDURE — 82043 UR ALBUMIN QUANTITATIVE: CPT

## 2022-04-15 PROCEDURE — 36415 COLL VENOUS BLD VENIPUNCTURE: CPT

## 2022-04-15 PROCEDURE — 82570 ASSAY OF URINE CREATININE: CPT

## 2022-04-15 PROCEDURE — 83036 HEMOGLOBIN GLYCOSYLATED A1C: CPT

## 2022-04-15 PROCEDURE — 80048 BASIC METABOLIC PNL TOTAL CA: CPT

## 2022-04-18 ENCOUNTER — TELEPHONE (OUTPATIENT)
Dept: ENDOCRINOLOGY | Facility: CLINIC | Age: 52
End: 2022-04-18

## 2022-04-25 NOTE — ASSESSMENT & PLAN NOTE
Patient with longstanding hypertension  Blood pressure is mildly elevated on exam  140/80  I had resumed her HCTZ 25 mg daily after labs were obtained after her last office visit  Labs stable 4/2021  Continue current medications without change  Patient was asked to monitor intermittently at home and to call if consistently > 111 systolic

## 2022-04-27 ENCOUNTER — OFFICE VISIT (OUTPATIENT)
Dept: ENDOCRINOLOGY | Facility: CLINIC | Age: 52
End: 2022-04-27
Payer: COMMERCIAL

## 2022-04-27 VITALS
BODY MASS INDEX: 42.62 KG/M2 | HEIGHT: 68 IN | HEART RATE: 69 BPM | DIASTOLIC BLOOD PRESSURE: 74 MMHG | SYSTOLIC BLOOD PRESSURE: 120 MMHG | WEIGHT: 281.2 LBS

## 2022-04-27 DIAGNOSIS — E78.1 HYPERTRIGLYCERIDEMIA: ICD-10-CM

## 2022-04-27 DIAGNOSIS — E04.2 MULTIPLE THYROID NODULES: ICD-10-CM

## 2022-04-27 DIAGNOSIS — E11.9 TYPE 2 DIABETES MELLITUS WITHOUT COMPLICATION, WITHOUT LONG-TERM CURRENT USE OF INSULIN (HCC): Primary | ICD-10-CM

## 2022-04-27 DIAGNOSIS — E11.9 NON-INSULIN DEPENDENT TYPE 2 DIABETES MELLITUS (HCC): ICD-10-CM

## 2022-04-27 DIAGNOSIS — E66.01 MORBID OBESITY (HCC): ICD-10-CM

## 2022-04-27 DIAGNOSIS — I10 ESSENTIAL HYPERTENSION: ICD-10-CM

## 2022-04-27 PROCEDURE — 99214 OFFICE O/P EST MOD 30 MIN: CPT | Performed by: STUDENT IN AN ORGANIZED HEALTH CARE EDUCATION/TRAINING PROGRAM

## 2022-04-27 RX ORDER — ATORVASTATIN CALCIUM 10 MG/1
10 TABLET, FILM COATED ORAL DAILY
Qty: 90 TABLET | Refills: 1 | Status: SHIPPED | OUTPATIENT
Start: 2022-04-27 | End: 2022-08-04

## 2022-04-27 RX ORDER — SEMAGLUTIDE 1.34 MG/ML
0.5 INJECTION, SOLUTION SUBCUTANEOUS WEEKLY
Qty: 1.5 ML | Refills: 3
Start: 2022-04-27 | End: 2022-05-09 | Stop reason: SDUPTHER

## 2022-04-27 NOTE — PATIENT INSTRUCTIONS
Try lipitor 10 mg every other day    Try increasing ozempic 0 5 mg weekly, contact me with any issues          For thyroid US, please call this number for scheduling: [phone]    Check sugars daily    Best times to monitor are on waking, before meals or bedtime  Ok to alternate different times of day    Goal Blood Sugars:   Premeal , even better <110  2hr after a meal <180, even better <140  A1C <7%, even better <6 5%      Aim for 45g carbohydrates with meals  15-20g with snacks    Goal exercise is 30 minutes daily, most days of the week    Please have labs done before next visit    Bring your meter or logbook with you each visit    Return appointment 3 months

## 2022-04-27 NOTE — ASSESSMENT & PLAN NOTE
Diabetes is improving  Will plan to retry 0 5 mg weekly dose of ozempic  If unable to tolerate, perhaps may consider a class switch, such as trulicity  My hope is to taper off of glipizide 5 mg bid  She was previously unable to tolerate metformin  Jeannette Scanlon is advised to let me know how she does with the increased dose  I did provide her some guidance for mitigation of symptoms  Encouraged daily to every other day fingersticks

## 2022-04-27 NOTE — ASSESSMENT & PLAN NOTE
Reporting some myalgias with lipitor, which improved after stopping therapy  I reviewed indications and role of statin therapy for ASCVD risk reduction  Offered her to try qOD dosing of lipitor 10 mg  Triglycerides improving on last check   Will follow in future

## 2022-04-27 NOTE — PROGRESS NOTES
Christelle Salazar 47 y o  female MRN: [id number]    Encounter: [id number]      Assessment/Plan     Problem List Items Addressed This Visit        Endocrine    Type 2 diabetes mellitus without complication, without long-term current use of insulin ([place] [address] ) - Primary     Diabetes is improving  Will plan to retry 0 5 mg weekly dose of ozempic  If unable to tolerate, perhaps may consider a class switch, such as trulicity  My hope is to taper off of glipizide 5 mg bid  She was previously unable to tolerate metformin  [place] Hemp is advised to let me know how she does with the increased dose  I did provide her some guidance for mitigation of symptoms  Encouraged daily to every other day fingersticks  Relevant Medications    atorvastatin (LIPITOR) 10 mg tablet    Semaglutide,0 25 or 0 5MG/DOS, (Ozempic, 0 25 or 0 5 MG/DOSE,) 2 MG/1 5ML SOPN    Other Relevant Orders    Basic metabolic panel Lab Collect    HEMOGLOBIN A1C W/ EAG ESTIMATION Lab Collect    Lipid panel Lab Collect Lab Collect    Multiple thyroid nodules     Has script for thyroid US  Provided central scheduling number to her to arrange appointment            Cardiovascular and Mediastinum    Essential hypertension     Controlled  Continue present therapy            Other    Morbid obesity ([place] [address] )     Saw CDE  Encourage healthy lifestyle  Increase ozempic 0 5 mg weekly  Hypertriglyceridemia     Reporting some myalgias with lipitor, which improved after stopping therapy  I reviewed indications and role of statin therapy for ASCVD risk reduction  Offered her to try qOD dosing of lipitor 10 mg  Triglycerides improving on last check   Will follow in future         Relevant Medications    atorvastatin (LIPITOR) 10 mg tablet      Other Visit Diagnoses     Non-insulin dependent type 2 diabetes mellitus (HCC)        Relevant Medications    atorvastatin (LIPITOR) 10 mg tablet    Semaglutide,0 25 or 0 5MG/DOS, (Ozempic, 0 25 or 0 5 MG/DOSE,) 2 MG/1 5ML SOPN        CC: Diabetes, thyroid nodules    History of Present Illness     HPI:    Ezio Patel returns for follow-up of diabetes and thyroid nodules  Ezio Patel is presently taking glipizide 5 mg b i d  and Ozempic 0 25 mg weekly  She has had difficulty with tolerating higher dose of ozempic  She decreased her dose back to 0 25 mg weekly a few weeks ago  She is not checking blood sugars  She denies hyperglycemic symptoms  She denies any hypos  She is up to date on diabetes screening  She has a previous experience using metformin but this medication caused her diarrhea and upset stomach  For cholesterol she takes lipitor 10 mg daily  She stopped therapy recently due to myalgias, which she states improved after stopping therapy  Ezio Patel has yet to update thyroid US  She denies any compressive complaints  Review of Systems   Constitutional: Negative for fatigue, fever and unexpected weight change  HENT: Negative for trouble swallowing and voice change  Eyes: Negative for photophobia and visual disturbance  Respiratory: Negative for cough and shortness of breath  Cardiovascular: Negative for chest pain and leg swelling  Gastrointestinal: Negative for abdominal pain, constipation and diarrhea  Endocrine: Negative for polydipsia, polyphagia and polyuria  Skin: Negative for color change and pallor  Neurological: Negative for weakness and headaches  Hematological: Negative for adenopathy  Does not bruise/bleed easily  Psychiatric/Behavioral: Negative for sleep disturbance  The patient is not nervous/anxious          Historical Information   Past Medical History:   Diagnosis Date    COVID-19     Diverticulosis     Dry eye     Hypertension     Seasonal allergies     Thyroid nodule     Von Willebrand disease ([facility] [address] )      Past Surgical History:   Procedure Laterality Date    ARTHROSCOPIC REPAIR ACL Left     CHOLECYSTECTOMY      HYSTERECTOMY  2010    partial hysterectomy    KNEE ARTHROSCOPY Right     REDUCTION MAMMAPLASTY Bilateral 2016     Social History   Social History     Substance and Sexual Activity   Alcohol Use Not Currently    Comment: rare, holidays     Social History     Substance and Sexual Activity   Drug Use Never     Social History     Tobacco Use   Smoking Status Never Smoker   Smokeless Tobacco Never Used     Family History:   Family History   Problem Relation Age of Onset    Hypertension Mother     Hyperlipidemia Mother     Diabetes type II Mother     Breast cancer Mother     Thyroid cancer Mother     Hypertension Father     Heart attack Father     Hyperlipidemia Father     Lung cancer Father     Hypertension Maternal Grandmother     Cancer Maternal Grandmother     No Known Problems Maternal Grandfather     No Known Problems Paternal Grandmother     No Known Problems Paternal Grandfather     Skin cancer Maternal Aunt     No Known Problems Paternal Aunt        Meds/Allergies   Current Outpatient Medications   Medication Sig Dispense Refill    amLODIPine (NORVASC) 10 mg tablet Take 1 tablet (10 mg total) by mouth daily 90 tablet 3    cholecalciferol (VITAMIN D3) 1,000 units tablet Take 2 tablets (2,000 Units total) by mouth daily (Patient taking differently: Take 1,000 Units by mouth daily ) 60 tablet 0    cycloSPORINE (RESTASIS) 0 05 % ophthalmic emulsion 1 drop 2 (two) times a day      glipiZIDE (GLUCOTROL) 5 mg tablet 5 mg 2 (two) times a day      hydrochlorothiazide (HYDRODIURIL) 25 mg tablet TAKE 1 TABLET BY MOUTH EVERY DAY 90 tablet 3    lisinopril (ZESTRIL) 10 mg tablet Take 1 tablet (10 mg total) by mouth daily 90 tablet 3    metoprolol tartrate (LOPRESSOR) 50 mg tablet Take 1 tablet (50 mg total) by mouth 2 (two) times a day 180 tablet 3    Semaglutide,0 25 or 0 5MG/DOS, (Ozempic, 0 25 or 0 5 MG/DOSE,) 2 MG/1 5ML SOPN Inject 0 5 mg under the skin once a week 1 5 mL 3    atorvastatin (LIPITOR) 10 mg tablet Take 1 tablet (10 mg total) by mouth daily 90 tablet 1    fluticasone (FLONASE) 50 mcg/act nasal spray   (Patient not taking: Reported on 1/13/2022 )  4     No current facility-administered medications for this visit  No Known Allergies    Objective   Vitals: Blood pressure 120/74, pulse 69, height 5' 8" (1 727 m), weight 128 kg (281 lb 3 2 oz)  Physical Exam  Vitals reviewed  Constitutional:       Appearance: She is not diaphoretic  HENT:      Head: Normocephalic and atraumatic  Nose: Nose normal    Eyes:      General: No scleral icterus  Cardiovascular:      Rate and Rhythm: Normal rate and regular rhythm  Pulmonary:      Effort: Pulmonary effort is normal  No respiratory distress  Abdominal:      Palpations: Abdomen is soft  Musculoskeletal:      Right lower leg: No edema  Left lower leg: No edema  Skin:     General: Skin is warm and dry  Neurological:      General: No focal deficit present  Mental Status: She is alert  Psychiatric:         Mood and Affect: Mood normal          Behavior: Behavior normal          The history was obtained from the review of the chart, patient and family      Lab Results:   Lab Results   Component Value Date/Time    Hemoglobin A1C 6 8 (H) 04/15/2022 08:02 AM    Hemoglobin A1C 7 2 (H) 01/08/2022 10:14 AM    Hemoglobin A1C 6 2 (H) 10/19/2021 03:03 PM    WBC 14 37 (H) 10/19/2021 03:03 PM    Hemoglobin 13 8 10/19/2021 03:03 PM    Hematocrit 41 2 10/19/2021 03:03 PM    MCV 89 10/19/2021 03:03 PM    Platelets 504 14/31/9365 03:03 PM    BUN 7 04/15/2022 08:02 AM    BUN 6 01/08/2022 10:14 AM    BUN 9 10/19/2021 03:03 PM    Potassium 3 8 04/15/2022 08:02 AM    Potassium 4 1 01/08/2022 10:14 AM    Potassium 4 3 10/19/2021 03:03 PM    Chloride 105 04/15/2022 08:02 AM    Chloride 103 01/08/2022 10:14 AM    Chloride 102 10/19/2021 03:03 PM    CO2 27 04/15/2022 08:02 AM    CO2 29 01/08/2022 10:14 AM    CO2 30 10/19/2021 03:03 PM    Creatinine 0 69 04/15/2022 08:02 AM    Creatinine 0 69 01/08/2022 10:14 AM Creatinine 0 73 10/19/2021 03:03 PM    AST 14 10/19/2021 03:03 PM    ALT 34 10/19/2021 03:03 PM    Albumin 3 4 (L) 10/19/2021 03:03 PM    HDL, Direct 43 (L) 01/08/2022 10:14 AM    HDL, Direct 46 10/19/2021 03:03 PM    Triglycerides 227 (H) 01/08/2022 10:14 AM    Triglycerides 272 (H) 10/19/2021 03:03 PM     Lab Results   Component Value Date    LDLCALC 53 01/08/2022         Imaging Studies: I have personally reviewed pertinent reports  Portions of the record may have been created with voice recognition software  Occasional wrong word or "sound a like" substitutions may have occurred due to the inherent limitations of voice recognition software  Read the chart carefully and recognize, using context, where substitutions have occurred

## 2022-05-09 ENCOUNTER — PATIENT MESSAGE (OUTPATIENT)
Dept: ENDOCRINOLOGY | Facility: CLINIC | Age: 52
End: 2022-05-09

## 2022-05-09 DIAGNOSIS — E11.9 NON-INSULIN DEPENDENT TYPE 2 DIABETES MELLITUS (HCC): ICD-10-CM

## 2022-05-09 RX ORDER — SEMAGLUTIDE 1.34 MG/ML
0.5 INJECTION, SOLUTION SUBCUTANEOUS WEEKLY
Qty: 1.5 ML | Refills: 3 | Status: SHIPPED | OUTPATIENT
Start: 2022-05-09 | End: 2022-07-26

## 2022-07-26 ENCOUNTER — APPOINTMENT (OUTPATIENT)
Dept: LAB | Facility: HOSPITAL | Age: 52
End: 2022-07-26
Attending: STUDENT IN AN ORGANIZED HEALTH CARE EDUCATION/TRAINING PROGRAM
Payer: COMMERCIAL

## 2022-07-26 DIAGNOSIS — E11.9 NON-INSULIN DEPENDENT TYPE 2 DIABETES MELLITUS (HCC): ICD-10-CM

## 2022-07-26 DIAGNOSIS — E11.9 TYPE 2 DIABETES MELLITUS WITHOUT COMPLICATION, WITHOUT LONG-TERM CURRENT USE OF INSULIN (HCC): ICD-10-CM

## 2022-07-26 LAB
ANION GAP SERPL CALCULATED.3IONS-SCNC: 7 MMOL/L (ref 4–13)
BUN SERPL-MCNC: 9 MG/DL (ref 5–25)
CALCIUM SERPL-MCNC: 8.9 MG/DL (ref 8.3–10.1)
CHLORIDE SERPL-SCNC: 103 MMOL/L (ref 96–108)
CHOLEST SERPL-MCNC: 140 MG/DL
CO2 SERPL-SCNC: 29 MMOL/L (ref 21–32)
CREAT SERPL-MCNC: 0.82 MG/DL (ref 0.6–1.3)
GFR SERPL CREATININE-BSD FRML MDRD: 83 ML/MIN/1.73SQ M
GLUCOSE P FAST SERPL-MCNC: 136 MG/DL (ref 65–99)
HDLC SERPL-MCNC: 39 MG/DL
LDLC SERPL CALC-MCNC: 54 MG/DL (ref 0–100)
NONHDLC SERPL-MCNC: 101 MG/DL
POTASSIUM SERPL-SCNC: 3.8 MMOL/L (ref 3.5–5.3)
SODIUM SERPL-SCNC: 139 MMOL/L (ref 135–147)
TRIGL SERPL-MCNC: 233 MG/DL

## 2022-07-26 PROCEDURE — 80048 BASIC METABOLIC PNL TOTAL CA: CPT

## 2022-07-26 PROCEDURE — 80061 LIPID PANEL: CPT

## 2022-07-26 PROCEDURE — 83036 HEMOGLOBIN GLYCOSYLATED A1C: CPT

## 2022-07-26 PROCEDURE — 36415 COLL VENOUS BLD VENIPUNCTURE: CPT

## 2022-07-26 RX ORDER — SEMAGLUTIDE 1.34 MG/ML
0.5 INJECTION, SOLUTION SUBCUTANEOUS WEEKLY
Qty: 4.5 ML | Refills: 1 | Status: SHIPPED | OUTPATIENT
Start: 2022-07-26 | End: 2022-08-04 | Stop reason: DRUGHIGH

## 2022-07-27 LAB
EST. AVERAGE GLUCOSE BLD GHB EST-MCNC: 146 MG/DL
HBA1C MFR BLD: 6.7 %

## 2022-08-04 ENCOUNTER — OFFICE VISIT (OUTPATIENT)
Dept: ENDOCRINOLOGY | Facility: CLINIC | Age: 52
End: 2022-08-04
Payer: COMMERCIAL

## 2022-08-04 VITALS
BODY MASS INDEX: 41.34 KG/M2 | WEIGHT: 272.8 LBS | DIASTOLIC BLOOD PRESSURE: 80 MMHG | HEIGHT: 68 IN | SYSTOLIC BLOOD PRESSURE: 118 MMHG | HEART RATE: 81 BPM

## 2022-08-04 DIAGNOSIS — I10 ESSENTIAL HYPERTENSION: ICD-10-CM

## 2022-08-04 DIAGNOSIS — E78.1 HYPERTRIGLYCERIDEMIA: ICD-10-CM

## 2022-08-04 DIAGNOSIS — E11.9 TYPE 2 DIABETES MELLITUS WITHOUT COMPLICATION, WITHOUT LONG-TERM CURRENT USE OF INSULIN (HCC): Primary | ICD-10-CM

## 2022-08-04 DIAGNOSIS — E04.2 MULTIPLE THYROID NODULES: ICD-10-CM

## 2022-08-04 PROCEDURE — 99214 OFFICE O/P EST MOD 30 MIN: CPT | Performed by: STUDENT IN AN ORGANIZED HEALTH CARE EDUCATION/TRAINING PROGRAM

## 2022-08-04 RX ORDER — PANTOPRAZOLE SODIUM 20 MG/1
20 TABLET, DELAYED RELEASE ORAL DAILY
COMMUNITY
Start: 2022-07-16

## 2022-08-04 RX ORDER — SEMAGLUTIDE 1.34 MG/ML
1 INJECTION, SOLUTION SUBCUTANEOUS WEEKLY
Qty: 9 ML | Refills: 1 | Status: SHIPPED | OUTPATIENT
Start: 2022-08-04

## 2022-08-04 RX ORDER — GLIPIZIDE 5 MG/1
5 TABLET ORAL DAILY
Start: 2022-08-04

## 2022-08-04 NOTE — PROGRESS NOTES
Tia Mckeon 47 y o  female MRN: [id number]    Encounter: [id number]      Assessment/Plan     Problem List Items Addressed This Visit        Endocrine    Type 2 diabetes mellitus without complication, without long-term current use of insulin ([facility] ) - Primary       Lab Results   Component Value Date    HGBA1C 6 7 (H) 07/26/2022 ·   Recent A1c from 7/26/22 reviewed  A1c is essentially stable compared to her last value of 6 8 in April 2022  · She is interested in decreasing her dose of glimepiride, which is reasonable  Ok to reduce dosing to once daily  · Continue Ozempic at current dose of 0 5mg weekly for now, with plans to increase to the Ozempic 1mg weekly in the next few months, once her current Ozempic pens have been depleted, and following planned vacation  Hopefully her adverse effects would remain tolerable  · Encouraged to follow up with more detailed blood glucose log, including post meal values, at variable times of the day, send log to us within the next few weeks for review  Relevant Medications    glipiZIDE (GLUCOTROL) 5 mg tablet    semaglutide, 1 mg/dose, (Ozempic, 1 MG/DOSE,) 4 MG/3ML SOPN injection pen    Other Relevant Orders    Basic metabolic panel Lab Collect    HEMOGLOBIN A1C W/ EAG ESTIMATION Lab Collect    Multiple thyroid nodules     · Has existing order for Thyroid ultrasound in Epic  · Reviewed how to schedule this appointment through central scheduling  Cardiovascular and Mediastinum    Essential hypertension     · BP reviewed and stable today - 118/80            Other    Hypertriglyceridemia     · Patient is able to tolerate statin again at daily dosing  · Her triglycerides are remaining elevated in the 230s range  Encouraged her to make dietary adjustments  Noted additional triglyceride lowering medication can be a consideration in future, especially if her cardiovascular disease risk increases                 CC: Diabetes  History of Present Illness HPI:  Nakita Ramires is here for a routine follow up for diabetes  At her last visit in April 2022 she was retrialed on Ozempic 0 5mg weekly, with hopes to taper off glipizide in near future  She was given guidance at the time for mitigation of potential adverse effects from GLP1-RA  Current she reports she is feeling well overall  No major changes  She is getting ready to leave for vacation to [facility] next week and looking forward to this  Presents with limited BG log  She is checking BG once daily only some days, however she is alternating times she is checking, except it appears she is avoiding checks in the evening hours  Blood glucose values are 104 to 194  She continues to work night shift  She reports Ozempic is working better for her this time  She feels it "dims" her appetite  She is tolerating the 0 5mg dose well so far  Regarding statin, she has been taking her Lipitor 10mg back at a daily schedule again, increased from every other day  She is tolerating this ok, with no myalgias  She is waiting until after vacation to schedule her thyroid ultrasound  Review of Systems   Constitutional: Negative for chills and fever  HENT: Negative for ear pain and sore throat  Eyes: Negative for pain and visual disturbance  Respiratory: Negative for cough and shortness of breath  Cardiovascular: Negative for chest pain and palpitations  Gastrointestinal: Negative for abdominal pain and vomiting  Genitourinary: Negative for dysuria and hematuria  Musculoskeletal: Negative for arthralgias and back pain  Skin: Negative for color change and rash  Neurological: Negative for seizures and syncope  All other systems reviewed and are negative        Historical Information   Past Medical History:   Diagnosis Date    COVID-19     Diverticulosis     Dry eye     Hypertension     Seasonal allergies     Thyroid nodule     Von Willebrand disease ([facility] [address] )      Past Surgical History: Procedure Laterality Date    ARTHROSCOPIC REPAIR ACL Left     CHOLECYSTECTOMY      HYSTERECTOMY  2010    partial hysterectomy    KNEE ARTHROSCOPY Right     REDUCTION MAMMAPLASTY Bilateral 2016     Social History   Social History     Substance and Sexual Activity   Alcohol Use Not Currently    Comment: rare, holidays     Social History     Substance and Sexual Activity   Drug Use Never     Social History     Tobacco Use   Smoking Status Never Smoker   Smokeless Tobacco Never Used     Family History:   Family History   Problem Relation Age of Onset    Hypertension Mother     Hyperlipidemia Mother     Diabetes type II Mother     Breast cancer Mother     Thyroid cancer Mother     Hypertension Father     Heart attack Father     Hyperlipidemia Father     Lung cancer Father     Hypertension Maternal Grandmother     Cancer Maternal Grandmother     No Known Problems Maternal Grandfather     No Known Problems Paternal Grandmother     No Known Problems Paternal Grandfather     Skin cancer Maternal Aunt     No Known Problems Paternal Aunt        Meds/Allergies   Current Outpatient Medications   Medication Sig Dispense Refill    amLODIPine (NORVASC) 10 mg tablet Take 1 tablet (10 mg total) by mouth daily 90 tablet 3    atorvastatin (LIPITOR) 10 mg tablet Take 1 tablet (10 mg total) by mouth daily 90 tablet 1    cholecalciferol (VITAMIN D3) 1,000 units tablet Take 2 tablets (2,000 Units total) by mouth daily (Patient taking differently: Take 1,000 Units by mouth daily ) 60 tablet 0    cycloSPORINE (RESTASIS) 0 05 % ophthalmic emulsion 1 drop 2 (two) times a day      fluticasone (FLONASE) 50 mcg/act nasal spray   (Patient not taking: Reported on 1/13/2022 )  4    glipiZIDE (GLUCOTROL) 5 mg tablet 5 mg 2 (two) times a day      hydrochlorothiazide (HYDRODIURIL) 25 mg tablet TAKE 1 TABLET BY MOUTH EVERY DAY 90 tablet 3    lisinopril (ZESTRIL) 10 mg tablet Take 1 tablet (10 mg total) by mouth daily 90 tablet 3    metoprolol tartrate (LOPRESSOR) 50 mg tablet Take 1 tablet (50 mg total) by mouth 2 (two) times a day 180 tablet 3    Semaglutide,0 25 or 0 5MG/DOS, (Ozempic, 0 25 or 0 5 MG/DOSE,) 2 MG/1 5ML SOPN Inject 0 5 mg under the skin once a week E11 9 4 5 mL 1     No current facility-administered medications for this visit  No Known Allergies    Objective   Vitals: Blood pressure 118/80, pulse 81, height 5' 8" (1 727 m), weight 124 kg (272 lb 12 8 oz)  Physical Exam  Vitals and nursing note reviewed  Constitutional:       General: She is not in acute distress  Appearance: She is not ill-appearing  Comments: Wears glasses  HENT:      Head: Normocephalic  Mouth/Throat:      Mouth: Mucous membranes are moist    Neck:      Thyroid: No thyroid mass, thyromegaly or thyroid tenderness  Cardiovascular:      Rate and Rhythm: Normal rate and regular rhythm  Pulses: no weak pulses          Dorsalis pedis pulses are 2+ on the right side and 2+ on the left side  Heart sounds: Normal heart sounds  Pulmonary:      Effort: Pulmonary effort is normal       Breath sounds: Normal breath sounds  Musculoskeletal:      Right lower leg: No edema  Left lower leg: No edema  Feet:      Right foot:      Skin integrity: No ulcer, skin breakdown, erythema, warmth, callus or dry skin  Left foot:      Skin integrity: No ulcer, skin breakdown, erythema, warmth, callus or dry skin  Skin:     General: Skin is warm and dry  Neurological:      Mental Status: She is alert  Psychiatric:         Mood and Affect: Mood normal      Diabetic Foot Exam    Patient's shoes and socks removed  Right Foot/Ankle   Right Foot Inspection  Skin Exam: skin normal and skin intact  No dry skin, no warmth, no callus, no erythema, no maceration, no abnormal color, no pre-ulcer, no ulcer and no callus  Sensory   Vibration: intact  Monofilament testing: intact    Vascular  The right DP pulse is 2+  Left Foot/Ankle  Left Foot Inspection  Skin Exam: skin normal and skin intact  No dry skin, no warmth, no erythema, no maceration, normal color, no pre-ulcer, no ulcer and no callus  Sensory   Vibration: intact  Monofilament testing: intact    Vascular  The left DP pulse is 2+  Assign Risk Category  No deformity present  Loss of protective sensation  No weak pulses  Risk: 1      The history was obtained from the review of the chart, patient  Lab Results:   Lab Results   Component Value Date/Time    Hemoglobin A1C 6 7 (H) 07/26/2022 10:50 AM    Hemoglobin A1C 6 8 (H) 04/15/2022 08:02 AM    Hemoglobin A1C 7 2 (H) 01/08/2022 10:14 AM    WBC 14 37 (H) 10/19/2021 03:03 PM    Hemoglobin 13 8 10/19/2021 03:03 PM    Hematocrit 41 2 10/19/2021 03:03 PM    MCV 89 10/19/2021 03:03 PM    Platelets 327 15/15/5479 03:03 PM    BUN 9 07/26/2022 10:50 AM    BUN 7 04/15/2022 08:02 AM    BUN 6 01/08/2022 10:14 AM    Potassium 3 8 07/26/2022 10:50 AM    Potassium 3 8 04/15/2022 08:02 AM    Potassium 4 1 01/08/2022 10:14 AM    Chloride 103 07/26/2022 10:50 AM    Chloride 105 04/15/2022 08:02 AM    Chloride 103 01/08/2022 10:14 AM    CO2 29 07/26/2022 10:50 AM    CO2 27 04/15/2022 08:02 AM    CO2 29 01/08/2022 10:14 AM    Creatinine 0 82 07/26/2022 10:50 AM    Creatinine 0 69 04/15/2022 08:02 AM    Creatinine 0 69 01/08/2022 10:14 AM    AST 14 10/19/2021 03:03 PM    ALT 34 10/19/2021 03:03 PM    Albumin 3 4 (L) 10/19/2021 03:03 PM    HDL, Direct 39 (L) 07/26/2022 10:50 AM    HDL, Direct 43 (L) 01/08/2022 10:14 AM    HDL, Direct 46 10/19/2021 03:03 PM    Triglycerides 233 (H) 07/26/2022 10:50 AM    Triglycerides 227 (H) 01/08/2022 10:14 AM    Triglycerides 272 (H) 10/19/2021 03:03 PM           Imaging Studies: n/a    Portions of the record may have been created with voice recognition software   Occasional wrong word or "sound a like" substitutions may have occurred due to the inherent limitations of voice recognition software  Read the chart carefully and recognize, using context, where substitutions have occurred

## 2022-08-04 NOTE — PATIENT INSTRUCTIONS
Follow up labwork (a1c and BMP) recommended in 3-4 months, just prior to your next follow up appointment  Please schedule your thyroid ultrasound prior to that time if possible, so that we can review this study at that time as well  Ok to reduce dosing of your glimepiride to once daily  Ok to increase your Ozempic from 0 5mg to 1mg weekly once your current supply has been depleted

## 2022-08-04 NOTE — ASSESSMENT & PLAN NOTE
· Has existing order for Thyroid ultrasound in Epic  · Reviewed how to schedule this appointment through central scheduling

## 2022-08-04 NOTE — ASSESSMENT & PLAN NOTE
· Patient is able to tolerate statin again at daily dosing  · Her triglycerides are remaining elevated in the 230s range  Encouraged her to make dietary adjustments  Noted additional triglyceride lowering medication can be a consideration in future, especially if her cardiovascular disease risk increases

## 2022-08-04 NOTE — ASSESSMENT & PLAN NOTE
Lab Results   Component Value Date    HGBA1C 6 7 (H) 07/26/2022   · Recent A1c from 7/26/22 reviewed  A1c is essentially stable compared to her last value of 6 8 in April 2022  · She is interested in decreasing her dose of glimepiride, which is reasonable  Ok to reduce dosing to once daily  · Continue Ozempic at current dose of 0 5mg weekly for now, with plans to increase to the Ozempic 1mg weekly in the next few months, once her current Ozempic pens have been depleted, and following planned vacation  Hopefully her adverse effects would remain tolerable  · Encouraged to follow up with more detailed blood glucose log, including post meal values, at variable times of the day, send log to us within the next few weeks for review

## 2022-10-03 ENCOUNTER — OFFICE VISIT (OUTPATIENT)
Dept: CARDIOLOGY CLINIC | Facility: CLINIC | Age: 52
End: 2022-10-03
Payer: COMMERCIAL

## 2022-10-03 ENCOUNTER — APPOINTMENT (OUTPATIENT)
Dept: LAB | Facility: HOSPITAL | Age: 52
End: 2022-10-03
Attending: INTERNAL MEDICINE
Payer: COMMERCIAL

## 2022-10-03 VITALS
DIASTOLIC BLOOD PRESSURE: 80 MMHG | SYSTOLIC BLOOD PRESSURE: 118 MMHG | OXYGEN SATURATION: 96 % | WEIGHT: 277 LBS | HEIGHT: 68 IN | BODY MASS INDEX: 41.98 KG/M2 | HEART RATE: 87 BPM

## 2022-10-03 DIAGNOSIS — E87.6 HYPOKALEMIA: Primary | ICD-10-CM

## 2022-10-03 DIAGNOSIS — E11.9 TYPE 2 DIABETES MELLITUS WITHOUT COMPLICATION, WITHOUT LONG-TERM CURRENT USE OF INSULIN (HCC): ICD-10-CM

## 2022-10-03 DIAGNOSIS — R25.2 LEG CRAMPS: ICD-10-CM

## 2022-10-03 DIAGNOSIS — E66.01 MORBID OBESITY (HCC): ICD-10-CM

## 2022-10-03 DIAGNOSIS — I10 ESSENTIAL HYPERTENSION: ICD-10-CM

## 2022-10-03 DIAGNOSIS — E87.6 HYPOKALEMIA: ICD-10-CM

## 2022-10-03 PROBLEM — E78.2 MIXED HYPERLIPIDEMIA: Status: ACTIVE | Noted: 2022-10-03

## 2022-10-03 LAB
ANION GAP SERPL CALCULATED.3IONS-SCNC: 6 MMOL/L (ref 4–13)
BUN SERPL-MCNC: 12 MG/DL (ref 5–25)
CALCIUM SERPL-MCNC: 8.8 MG/DL (ref 8.3–10.1)
CHLORIDE SERPL-SCNC: 103 MMOL/L (ref 96–108)
CO2 SERPL-SCNC: 32 MMOL/L (ref 21–32)
CREAT SERPL-MCNC: 0.63 MG/DL (ref 0.6–1.3)
GFR SERPL CREATININE-BSD FRML MDRD: 104 ML/MIN/1.73SQ M
GLUCOSE SERPL-MCNC: 141 MG/DL (ref 65–140)
MAGNESIUM SERPL-MCNC: 1.9 MG/DL (ref 1.6–2.6)
POTASSIUM SERPL-SCNC: 3.9 MMOL/L (ref 3.5–5.3)
SODIUM SERPL-SCNC: 141 MMOL/L (ref 135–147)

## 2022-10-03 PROCEDURE — 83735 ASSAY OF MAGNESIUM: CPT | Performed by: INTERNAL MEDICINE

## 2022-10-03 PROCEDURE — 99214 OFFICE O/P EST MOD 30 MIN: CPT | Performed by: INTERNAL MEDICINE

## 2022-10-03 PROCEDURE — 36415 COLL VENOUS BLD VENIPUNCTURE: CPT

## 2022-10-03 PROCEDURE — 80048 BASIC METABOLIC PNL TOTAL CA: CPT

## 2022-10-03 RX ORDER — METOPROLOL TARTRATE 50 MG/1
50 TABLET, FILM COATED ORAL 2 TIMES DAILY
Qty: 180 TABLET | Refills: 3 | Status: SHIPPED | OUTPATIENT
Start: 2022-10-03

## 2022-10-03 RX ORDER — MULTIVITAMIN WITH IRON
250 TABLET ORAL DAILY
Qty: 90 TABLET | Refills: 3
Start: 2022-10-03

## 2022-10-03 RX ORDER — LISINOPRIL 10 MG/1
10 TABLET ORAL DAILY
Qty: 90 TABLET | Refills: 3 | Status: SHIPPED | OUTPATIENT
Start: 2022-10-03

## 2022-10-03 RX ORDER — AMLODIPINE BESYLATE 10 MG/1
10 TABLET ORAL DAILY
Qty: 90 TABLET | Refills: 3 | Status: SHIPPED | OUTPATIENT
Start: 2022-10-03

## 2022-10-03 RX ORDER — HYDROCHLOROTHIAZIDE 25 MG/1
25 TABLET ORAL DAILY
Qty: 90 TABLET | Refills: 3 | Status: SHIPPED | OUTPATIENT
Start: 2022-10-03

## 2022-10-03 NOTE — PROGRESS NOTES
Cardiology Office Visit    Sae Score  [id number]  1970     [facility] [address] [facility] [place] [id number] [phone]      Dear Marti Rivas DO,    I had the pleasure of seeing your patient at our [address] [facility] office today 10/3/2022  As you know she is a pleasant 47 y o  female with a medical history as described below  Reason for office visit:  Follow-up hypertension  1  Hypokalemia  -     Basic metabolic panel; Future  -     Magnesium    2  Leg cramps  -     Basic metabolic panel; Future  -     Magnesium  -     Magnesium 250 MG TABS; Take 1 tablet (250 mg total) by mouth daily    3  Essential hypertension  -     POCT ECG  -     lisinopril (ZESTRIL) 10 mg tablet; Take 1 tablet (10 mg total) by mouth daily  -     amLODIPine (NORVASC) 10 mg tablet; Take 1 tablet (10 mg total) by mouth daily  -     metoprolol tartrate (LOPRESSOR) 50 mg tablet; Take 1 tablet (50 mg total) by mouth 2 (two) times a day  -     hydrochlorothiazide (HYDRODIURIL) 25 mg tablet; Take 1 tablet (25 mg total) by mouth daily    4  Morbid obesity ([address] )    5  Type 2 diabetes mellitus without complication, without long-term current use of insulin ([facility] [address] )       Plan:  ECG today is perfect  Blood pressure is perfect  Cholesterol overall is well controlled except for elevated triglycerides  I would try to watch diet for triglycerides  Refills sent to pharmacy  Blood pressure is well controlled  Add magnesium 250 mg daily for leg cramps  HPI   Patient previously followed with Dr Terrence Snowden who has since left the practice  Patient has a history of hypertension and has been on blood pressure medications since around the year 2000 (in her late 20's)  Echocardiogram 05/2019 showed normal LV function with an EF of 60% and grade 2 diastolic dysfunction    Family history of premature coronary artery disease in her father who had an MI in his 46s  Patient was last seen 09/16/2019 at which time she was doing well  1/26/2021:  Patient was noted to have [place] in December and was hospitalized in the ICU from 12/29/20-1/2/21  She was discharged home on her normal blood pressure medications with the exception of HCTZ being stopped  It appears that she was hypokalemic during her stay and I suspect this is why this was discontinued  She was discharged home on oxygen  She was also discharged home on steroids  She is currently only using oxygen if needed and admits that over the last few days she has not needed to use this  She is using an incentive spirometer at home  She does continue to have dyspnea on exertion  She does continue to have cough  She has returned to work  She denies any overt chest pain  5/3/2021: Patient presents for follow up  She is no longer on oxygen  She did have a repeat CT scan 4/8/2021 which showed resolved infiltrates and improved mediastinal adenopathy  Scattered lung nodules noted  9 month follow up was recommended  * Patient seen by [place]    10/3/2022: Patient presents to the office for follow up  She has been getting some leg cramps  Occasional palpitations  She is having some leg cramps up in her thigh  No lightheadedness or dizziness  She does not drink enough at work  Breathing is back to normal  She feels tired all the time   She does work 6 pm to 6 am      Patient Active Problem List   Diagnosis    Essential hypertension    Pneumonia due to COVID-19 virus    Morbid obesity ([address] )    Type 2 diabetes mellitus without complication, without long-term current use of insulin (HCC)    Von Willebrand disease    Acid reflux    Hypertriglyceridemia    Multiple thyroid nodules    Pulmonary nodule    Mixed hyperlipidemia     Past Medical History:   Diagnosis Date    COVID-19     Diverticulosis     Dry eye     Hypertension     Seasonal allergies     Thyroid nodule     Von Willebrand disease Social History     Socioeconomic History    Marital status: Single     Spouse name: Not on file    Number of children: Not on file    Years of education: Not on file    Highest education level: Not on file   Occupational History    Not on file   Tobacco Use    Smoking status: Never Smoker    Smokeless tobacco: Never Used   Vaping Use    Vaping Use: Never used   Substance and Sexual Activity    Alcohol use: Not Currently     Comment: rare, holidays    Drug use: Never    Sexual activity: Not on file     Comment: Defer   Other Topics Concern    Not on file   Social History Narrative    Not on file     Social Determinants of Health     Financial Resource Strain: Not on file   Food Insecurity: Not on file   Transportation Needs: Not on file   Physical Activity: Not on file   Stress: Not on file   Social Connections: Not on file   Intimate Partner Violence: Not on file   Housing Stability: Not on file      Family History   Problem Relation Age of Onset    Hypertension Mother     Hyperlipidemia Mother     Diabetes type II Mother     Breast cancer Mother     Thyroid cancer Mother     Hypertension Father     Heart attack Father     Hyperlipidemia Father     Lung cancer Father     Hypertension Maternal Grandmother     Cancer Maternal Grandmother     No Known Problems Maternal Grandfather     No Known Problems Paternal Grandmother     No Known Problems Paternal Grandfather     Skin cancer Maternal Aunt     No Known Problems Paternal Aunt      Past Surgical History:   Procedure Laterality Date    ARTHROSCOPIC REPAIR ACL Left     CHOLECYSTECTOMY      HYSTERECTOMY  2010    partial hysterectomy    KNEE ARTHROSCOPY Right     REDUCTION MAMMAPLASTY Bilateral 2016       Current Outpatient Medications:     amLODIPine (NORVASC) 10 mg tablet, Take 1 tablet (10 mg total) by mouth daily, Disp: 90 tablet, Rfl: 3    atorvastatin (LIPITOR) 10 mg tablet, Take 1 tablet (10 mg total) by mouth daily, Disp: 90 tablet, Rfl: 1    cycloSPORINE (RESTASIS) 0 05 % ophthalmic emulsion, 1 drop 2 (two) times a day, Disp: , Rfl:     fluticasone (FLONASE) 50 mcg/act nasal spray,  , Disp: , Rfl: 4    glipiZIDE (GLUCOTROL) 5 mg tablet, Take 1 tablet (5 mg total) by mouth in the morning, Disp: , Rfl:     hydrochlorothiazide (HYDRODIURIL) 25 mg tablet, Take 1 tablet (25 mg total) by mouth daily, Disp: 90 tablet, Rfl: 3    lisinopril (ZESTRIL) 10 mg tablet, Take 1 tablet (10 mg total) by mouth daily, Disp: 90 tablet, Rfl: 3    Magnesium 250 MG TABS, Take 1 tablet (250 mg total) by mouth daily, Disp: 90 tablet, Rfl: 3    metoprolol tartrate (LOPRESSOR) 50 mg tablet, Take 1 tablet (50 mg total) by mouth 2 (two) times a day, Disp: 180 tablet, Rfl: 3    pantoprazole (PROTONIX) 20 mg tablet, Take 20 mg by mouth daily, Disp: , Rfl:     semaglutide, 1 mg/dose, (Ozempic, 1 MG/DOSE,) 4 MG/3ML SOPN injection pen, Inject 0 75 mL (1 mg total) under the skin once a week, Disp: 9 mL, Rfl: 1    cholecalciferol (VITAMIN D3) 1,000 units tablet, Take 2 tablets (2,000 Units total) by mouth daily (Patient not taking: No sig reported), Disp: 60 tablet, Rfl: 0    No Known Allergies      Cardiac Test Results:     Lipid panel 7/26/2022: C 140  T 233  H 39  L 54  Lipid panel 9/11/2019: C 174  T 299  H 36  L 78  Echocardiogram 05/17/2019:  EF 60%  Grade 2 diastolic dysfunction  Left atrial size upper limit of normal     Review of Systems:    Review of Systems   Constitutional: Positive for fatigue (improved)  Negative for activity change and appetite change  HENT: Negative for congestion, hearing loss, tinnitus and trouble swallowing  Eyes: Negative for visual disturbance  Respiratory: Positive for shortness of breath (improved)  Negative for cough, chest tightness and wheezing  Cardiovascular: Negative for chest pain, palpitations and leg swelling     Gastrointestinal: Negative for abdominal distention, abdominal pain, nausea and vomiting  Genitourinary: Negative for difficulty urinating  Musculoskeletal: Negative for arthralgias  Skin: Negative for rash  Neurological: Negative for dizziness, syncope and light-headedness  Hematological: Does not bruise/bleed easily  Psychiatric/Behavioral: Negative for confusion  The patient is not nervous/anxious  All other systems reviewed and are negative  Vitals:    10/03/22 1013 10/03/22 1109   BP: 128/80 118/80   Pulse: 87    SpO2: 96%    Weight: 126 kg (277 lb)    Height: 5' 8" (1 727 m)      Vitals:    10/03/22 1013   Weight: 126 kg (277 lb)     Height: 5' 8" (172 7 cm)     Body mass index is 42 12 kg/m²  Physical Exam  Vitals reviewed  Constitutional:       Appearance: She is well-developed  HENT:      Head: Normocephalic and atraumatic  Eyes:      Conjunctiva/sclera: Conjunctivae normal       Pupils: Pupils are equal, round, and reactive to light  Neck:      Vascular: No JVD  Cardiovascular:      Rate and Rhythm: Normal rate and regular rhythm  Heart sounds: Normal heart sounds  No murmur heard  No friction rub  No gallop  Pulmonary:      Effort: Pulmonary effort is normal       Breath sounds: Examination of the left-lower field reveals decreased breath sounds  Decreased breath sounds present  Abdominal:      General: Bowel sounds are normal       Palpations: Abdomen is soft  Musculoskeletal:      Cervical back: Normal range of motion  Skin:     General: Skin is warm and dry  Neurological:      Mental Status: She is alert and oriented to person, place, and time     Psychiatric:         Behavior: Behavior normal

## 2022-10-03 NOTE — PATIENT INSTRUCTIONS
ECG today is perfect  Blood pressure is perfect  Cholesterol overall is well controlled except for elevated triglycerides  I would try to watch diet for triglycerides  Refills sent to pharmacy  Blood pressure is well controlled  Add magnesium 250 mg daily for leg cramps

## 2022-10-04 ENCOUNTER — TELEPHONE (OUTPATIENT)
Dept: CARDIOLOGY CLINIC | Facility: CLINIC | Age: 52
End: 2022-10-04

## 2022-10-04 NOTE — TELEPHONE ENCOUNTER
----- Message from Evangelina Bella 83 sent at 10/3/2022  5:44 PM EDT -----  Please let patient know that her blood work shows stable kidney function and potassium  Magnesium was 1 9, which is in normal range but slightly below our goal of 2  Continue with magnesium supplement as recommended by Dr Sarah Sánchez  Thank you!

## 2022-10-07 PROCEDURE — 93000 ELECTROCARDIOGRAM COMPLETE: CPT | Performed by: INTERNAL MEDICINE

## 2022-10-12 PROBLEM — U07.1 PNEUMONIA DUE TO COVID-19 VIRUS: Status: RESOLVED | Noted: 2020-12-29 | Resolved: 2022-10-12

## 2022-10-12 PROBLEM — J12.82 PNEUMONIA DUE TO COVID-19 VIRUS: Status: RESOLVED | Noted: 2020-12-29 | Resolved: 2022-10-12

## 2022-11-04 DIAGNOSIS — E11.9 NON-INSULIN DEPENDENT TYPE 2 DIABETES MELLITUS (HCC): ICD-10-CM

## 2022-11-04 DIAGNOSIS — E11.9 TYPE 2 DIABETES MELLITUS WITHOUT COMPLICATION, WITHOUT LONG-TERM CURRENT USE OF INSULIN (HCC): ICD-10-CM

## 2022-11-04 RX ORDER — ATORVASTATIN CALCIUM 10 MG/1
TABLET, FILM COATED ORAL
Qty: 90 TABLET | Refills: 1 | Status: SHIPPED | OUTPATIENT
Start: 2022-11-04

## 2022-11-29 ENCOUNTER — APPOINTMENT (OUTPATIENT)
Dept: LAB | Facility: HOSPITAL | Age: 52
End: 2022-11-29

## 2022-11-29 DIAGNOSIS — E11.9 TYPE 2 DIABETES MELLITUS WITHOUT COMPLICATION, WITHOUT LONG-TERM CURRENT USE OF INSULIN (HCC): ICD-10-CM

## 2022-11-29 LAB
ANION GAP SERPL CALCULATED.3IONS-SCNC: 5 MMOL/L (ref 4–13)
BUN SERPL-MCNC: 13 MG/DL (ref 5–25)
CALCIUM SERPL-MCNC: 9.2 MG/DL (ref 8.3–10.1)
CHLORIDE SERPL-SCNC: 103 MMOL/L (ref 96–108)
CO2 SERPL-SCNC: 28 MMOL/L (ref 21–32)
CREAT SERPL-MCNC: 0.82 MG/DL (ref 0.6–1.3)
EST. AVERAGE GLUCOSE BLD GHB EST-MCNC: 148 MG/DL
GFR SERPL CREATININE-BSD FRML MDRD: 83 ML/MIN/1.73SQ M
GLUCOSE P FAST SERPL-MCNC: 176 MG/DL (ref 65–99)
HBA1C MFR BLD: 6.8 %
POTASSIUM SERPL-SCNC: 4.1 MMOL/L (ref 3.5–5.3)
SODIUM SERPL-SCNC: 136 MMOL/L (ref 135–147)

## 2022-12-13 ENCOUNTER — OFFICE VISIT (OUTPATIENT)
Dept: ENDOCRINOLOGY | Facility: CLINIC | Age: 52
End: 2022-12-13

## 2022-12-13 VITALS
HEIGHT: 68 IN | BODY MASS INDEX: 44.41 KG/M2 | DIASTOLIC BLOOD PRESSURE: 74 MMHG | SYSTOLIC BLOOD PRESSURE: 130 MMHG | HEART RATE: 51 BPM | WEIGHT: 293 LBS

## 2022-12-13 DIAGNOSIS — E11.9 TYPE 2 DIABETES MELLITUS WITHOUT COMPLICATION, WITHOUT LONG-TERM CURRENT USE OF INSULIN (HCC): Primary | ICD-10-CM

## 2022-12-13 DIAGNOSIS — E04.2 MULTIPLE THYROID NODULES: ICD-10-CM

## 2022-12-13 DIAGNOSIS — E66.01 CLASS 3 SEVERE OBESITY DUE TO EXCESS CALORIES WITH SERIOUS COMORBIDITY AND BODY MASS INDEX (BMI) OF 40.0 TO 44.9 IN ADULT (HCC): ICD-10-CM

## 2022-12-13 DIAGNOSIS — E78.1 HYPERTRIGLYCERIDEMIA: ICD-10-CM

## 2022-12-13 DIAGNOSIS — I10 ESSENTIAL HYPERTENSION: ICD-10-CM

## 2022-12-13 RX ORDER — SEMAGLUTIDE 2.68 MG/ML
2 INJECTION, SOLUTION SUBCUTANEOUS WEEKLY
Qty: 3 ML | Refills: 3 | Status: SHIPPED | OUTPATIENT
Start: 2022-12-13

## 2022-12-13 NOTE — PATIENT INSTRUCTIONS
Look into [facility], which is a weekly treatment which we would use in place of ozempic if covered under your insurance  To schedule this appointment, please contact Central Scheduling at [phone]

## 2022-12-13 NOTE — PROGRESS NOTES
Lucien Cuenca 47 y o  female MRN: [id number]    Encounter: [id number]      Assessment/Plan     1  Type 2 diabetes - diabetes is stable on labs  Discussed increasing ozempic 2 mg weekly to support better BG control and also try to optimize weight  I also discussed tirzepatide (mounjaro) with Cam Aburto as I believe this agent may be considered as an alternative to ozempic for better BG control and weight loss support  She will look into this option with her insurance formulary  Hold glipizide  Recommend daily vs multiple times weekly fingerstick testing at scattering times  Follow up labs prior to next visit  2  Hypertension - stable  Continue current management  3  Hyperlipidemia - continue statin  Triglycerides high on last check  Continue to encourage optimal diet with focus on low saturated fat, also reduced refined carbs, modest EtOH, and better DM control  4  Obesity - continue to encourage optimized diet and lifestyle  Chary's night shift schedule may be contributory    5  Thyroid nodules - Cam Aburto was provided a new script to update her thyroid US  She has had thyroid US imaging dating back to as early as 2013   Will update current study, and attempt to corroborate its findings with prior records    Problem List Items Addressed This Visit        Endocrine    Type 2 diabetes mellitus without complication, without long-term current use of insulin ([facility] [address] ) - Primary    Relevant Medications    Semaglutide, 2 MG/DOSE, (Ozempic, 2 MG/DOSE,) 8 MG/3ML SOPN    Other Relevant Orders    Basic metabolic panel Lab Collect    HEMOGLOBIN A1C W/ EAG ESTIMATION Lab Collect    Multiple thyroid nodules    Relevant Orders    US thyroid       Cardiovascular and Mediastinum    Essential hypertension    Relevant Orders    Aldosterone/Renin Ratio       Other    Hypertriglyceridemia   Other Visit Diagnoses     Class 3 severe obesity due to excess calories with serious comorbidity and body mass index (BMI) of 40 0 to 44 9 in adult [facility])            RTC 4-mo    CC: Diabetes    History of Present Illness     HPI:    Nacho Felipe returns for follow up of diabetes  For diabetes she is taking ozempic 1 mg weekly, she ran out of glipizide recently  Sarah Dawn reports loss of interest now in processed sweets, which used to be a sore spot for her  She at least partially attributes this to her recent change in dosing of ozempic  Despite going up on ozempic, she has been gaining some weight  No meter or log today, but reported BG at home in the 130 range  No hyperglycemic symptoms, no hypoglycemia  For hypertension she takes amlodipine 10 mg daily, HCTZ 25 mg daily, lisinopril 10 mg daily, and metoprolol 50 mg bid  For hyperlipidemia she takes lipitor 10 mg daily  Chary's last thyroid US was in Nov 2020 and showed stable findings  Sarah Dawn denies any compressive neck complaints  Review of Systems   Constitutional: Negative for diaphoresis and unexpected weight change  HENT: Negative for trouble swallowing and voice change  Cardiovascular: Negative for palpitations  Gastrointestinal: Negative for nausea and vomiting  Endocrine: Negative for polydipsia and polyuria  Psychiatric/Behavioral: Negative for agitation and behavioral problems  All other systems reviewed and are negative        Historical Information   Past Medical History:   Diagnosis Date   • COVID-19    • Diverticulosis    • Dry eye    • Hypertension    • Seasonal allergies    • Thyroid nodule    • Von Willebrand disease      Past Surgical History:   Procedure Laterality Date   • ARTHROSCOPIC REPAIR ACL Left    • CHOLECYSTECTOMY     • HYSTERECTOMY  2010    partial hysterectomy   • KNEE ARTHROSCOPY Right    • REDUCTION MAMMAPLASTY Bilateral 2016     Social History   Social History     Substance and Sexual Activity   Alcohol Use Not Currently    Comment: rare, holidays     Social History     Substance and Sexual Activity   Drug Use Never     Social History     Tobacco Use   Smoking Status Never   Smokeless Tobacco Never     Family History:   Family History   Problem Relation Age of Onset   • Hypertension Mother    • Hyperlipidemia Mother    • Diabetes type II Mother    • Breast cancer Mother    • Thyroid cancer Mother    • Hypertension Father    • Heart attack Father    • Hyperlipidemia Father    • Lung cancer Father    • Hypertension Maternal Grandmother    • Cancer Maternal Grandmother    • No Known Problems Maternal Grandfather    • No Known Problems Paternal Grandmother    • No Known Problems Paternal Grandfather    • Skin cancer Maternal Aunt    • No Known Problems Paternal Aunt        Meds/Allergies   Current Outpatient Medications   Medication Sig Dispense Refill   • amLODIPine (NORVASC) 10 mg tablet Take 1 tablet (10 mg total) by mouth daily 90 tablet 3   • atorvastatin (LIPITOR) 10 mg tablet TAKE 1 TABLET BY MOUTH EVERY DAY 90 tablet 1   • hydrochlorothiazide (HYDRODIURIL) 25 mg tablet Take 1 tablet (25 mg total) by mouth daily 90 tablet 3   • lisinopril (ZESTRIL) 10 mg tablet Take 1 tablet (10 mg total) by mouth daily 90 tablet 3   • Magnesium 250 MG TABS Take 1 tablet (250 mg total) by mouth daily 90 tablet 3   • metoprolol tartrate (LOPRESSOR) 50 mg tablet Take 1 tablet (50 mg total) by mouth 2 (two) times a day 180 tablet 3   • Semaglutide, 2 MG/DOSE, (Ozempic, 2 MG/DOSE,) 8 MG/3ML SOPN Inject 2 mg under the skin once a week 3 mL 3   • cholecalciferol (VITAMIN D3) 1,000 units tablet Take 2 tablets (2,000 Units total) by mouth daily (Patient not taking: Reported on 8/4/2022) 60 tablet 0   • cycloSPORINE (RESTASIS) 0 05 % ophthalmic emulsion 1 drop 2 (two) times a day (Patient not taking: Reported on 12/13/2022)     • fluticasone (FLONASE) 50 mcg/act nasal spray   (Patient not taking: Reported on 12/13/2022)  4   • pantoprazole (PROTONIX) 20 mg tablet Take 20 mg by mouth daily (Patient not taking: Reported on 12/13/2022)       No current facility-administered medications for this visit  No Known Allergies    Objective   Vitals: Blood pressure 130/74, pulse (!) 51, height 5' 8" (1 727 m), weight 133 kg (294 lb)  Physical Exam  Vitals and nursing note reviewed  Constitutional:       General: She is not in acute distress  Appearance: Normal appearance  Comments: Wears glasses  HENT:      Head: Normocephalic  Mouth/Throat:      Mouth: Mucous membranes are moist    Eyes:      General: No scleral icterus  Conjunctiva/sclera: Conjunctivae normal    Neck:      Thyroid: No thyroid mass, thyromegaly or thyroid tenderness  Cardiovascular:      Rate and Rhythm: Normal rate and regular rhythm  Pulmonary:      Effort: Pulmonary effort is normal       Breath sounds: Normal breath sounds  Musculoskeletal:      Right lower leg: No edema  Left lower leg: No edema  Skin:     General: Skin is warm and dry  Neurological:      General: No focal deficit present  Mental Status: She is alert  Psychiatric:         Mood and Affect: Mood normal            The history was obtained from the review of the chart, patient      Lab Results:   Lab Results   Component Value Date/Time    Hemoglobin A1C 6 8 (H) 11/29/2022 08:42 AM    Hemoglobin A1C 6 7 (H) 07/26/2022 10:50 AM    Hemoglobin A1C 6 8 (H) 04/15/2022 08:02 AM    BUN 13 11/29/2022 08:42 AM    BUN 12 10/03/2022 11:32 AM    BUN 9 07/26/2022 10:50 AM    Potassium 4 1 11/29/2022 08:42 AM    Potassium 3 9 10/03/2022 11:32 AM    Potassium 3 8 07/26/2022 10:50 AM    Chloride 103 11/29/2022 08:42 AM    Chloride 103 10/03/2022 11:32 AM    Chloride 103 07/26/2022 10:50 AM    CO2 28 11/29/2022 08:42 AM    CO2 32 10/03/2022 11:32 AM    CO2 29 07/26/2022 10:50 AM    Creatinine 0 82 11/29/2022 08:42 AM    Creatinine 0 63 10/03/2022 11:32 AM    Creatinine 0 82 07/26/2022 10:50 AM    HDL, Direct 39 (L) 07/26/2022 10:50 AM    HDL, Direct 43 (L) 01/08/2022 10:14 AM    Triglycerides 233 (H) 07/26/2022 10:50 AM Triglycerides 227 (H) 01/08/2022 10:14 AM     Lab Results   Component Value Date    LDLCALC 54 07/26/2022           Imaging Studies:     11/6/2020    THYROID ULTRASOUND     INDICATION:    E04 1: Nontoxic single thyroid nodule  Follow-up nodules       COMPARISON:  4/9/2019 outside study; 7/24/2017; CT neck from 6/5/2020     TECHNIQUE:   Ultrasound of the thyroid was performed with a high frequency linear transducer in transverse and sagittal planes including volumetric imaging sweeps as well as traditional still imaging technique      FINDINGS:  Mildly heterogeneous gland     Right lobe:  4 x 1 6 x 1 7 cm   5 2 mL  Left lobe:  4 4 x 1 5 x 1 4 cm   4 5 mL  Isthmus:  0 2 cm      Nodule #1  Image 8  Right midpole measuring 1 1 x 0 7 x 1 cm  Previously 0 9 x 0 5 x 0 6   COMPOSITION:  0 points, spongiform  ECHOGENICITY:  Not applicable when spongiform composition  SHAPE:  0 points, wider-than-tall  MARGIN: 0 points, smooth  ECHOGENIC FOCI:  0 points, none or large comet-tail artifacts  TI-RADS Classification: TR 1 (0 points), Benign  No FNA      Nodule #2  Image 30  Left mid pole measuring 0 9 x 0 7 x 0 7 cm  This is visible on the prior study on volumetric sweeps and appears unchanged in size  This could also simply represent a lobule rather than a true nodule        COMPOSITION:  2 points, solid or almost completely solid   ECHOGENICITY:  1 point, hyperechoic or isoechoic  SHAPE:  0 points, wider-than-tall  MARGIN: 0 points, smooth  ECHOGENIC FOCI:  0 points, none or large comet-tail artifacts  TI-RADS Classification: TR 3 (3 points), FNA if >2 5 cm  Follow if >1 5 cm      Nodule #3  Image 35  Left lower pole measuring 1 0 x 0 9 x 0 7 cm   0 9 x 0 9 x 0 9 in 2019 (remeasured similarly to the current study)  0 9 x 0 8 x 0 7 cm in 2017  COMPOSITION:  2 points, solid or almost completely solid   ECHOGENICITY:  1 point, hyperechoic or isoechoic  SHAPE:  3 points, taller-than-wide  MARGIN: 0 points, smooth  ECHOGENIC FOCI:  0 points, none or large comet-tail artifacts  TI-RADS Classification: TR 4 (4-6 points), FNA if > 1 5 cm  Follow if > 1cm           IMPRESSION:     TR 4 lesion in the lower pole the left thyroid does not reach size criteria for biopsy and appears not significantly changed in size given differences in measurement technique  Follow-up in one year is advised      Reference: ACR Thyroid Imaging, Reporting and Data System (TI-RADS): White Paper of the [employer]  J AM Russ Radiol 1345;45:748-405  (additional recommendations based on American Thyroid Association 2015 guidelines )    Portions of the record may have been created with voice recognition software  Occasional wrong word or "sound a like" substitutions may have occurred due to the inherent limitations of voice recognition software  Read the chart carefully and recognize, using context, where substitutions have occurred

## 2022-12-29 DIAGNOSIS — E11.9 TYPE 2 DIABETES MELLITUS WITHOUT COMPLICATION, WITHOUT LONG-TERM CURRENT USE OF INSULIN (HCC): ICD-10-CM

## 2023-01-03 RX ORDER — SEMAGLUTIDE 2.68 MG/ML
INJECTION, SOLUTION SUBCUTANEOUS
Qty: 6 ML | Refills: 3 | Status: SHIPPED | OUTPATIENT
Start: 2023-01-03

## 2023-01-31 ENCOUNTER — HOSPITAL ENCOUNTER (OUTPATIENT)
Dept: ULTRASOUND IMAGING | Facility: HOSPITAL | Age: 53
Discharge: HOME | End: 2023-01-31
Attending: STUDENT IN AN ORGANIZED HEALTH CARE EDUCATION/TRAINING PROGRAM

## 2023-01-31 DIAGNOSIS — E04.2 MULTIPLE THYROID NODULES: ICD-10-CM

## 2023-02-07 ENCOUNTER — TELEPHONE (OUTPATIENT)
Dept: ENDOCRINOLOGY | Facility: CLINIC | Age: 53
End: 2023-02-07

## 2023-02-09 DIAGNOSIS — E04.2 MULTIPLE THYROID NODULES: Primary | ICD-10-CM

## 2023-03-06 ENCOUNTER — TELEPHONE (OUTPATIENT)
Dept: RADIOLOGY | Facility: HOSPITAL | Age: 53
End: 2023-03-06

## 2023-03-06 NOTE — NURSING NOTE
RN received telephone call back from pt to discuss upcoming appointment at [facility] and consultation completed  Pt is having a Thyroid Biopsy with Brain completed on 3/10/2023  Allergies reviewed and verified pt does not currently take any anticoagulant medications  Pre procedure instructions including diet and taking own medications discussed with pt  Pt instructed that she may eat normally and take medications as usual before the procedure  Pt verbalized understanding of instructions given  Reminded pt of the location, date and time of procedure  My number was given to call if any questions or concerns arise pre or post procedure

## 2023-03-10 ENCOUNTER — HOSPITAL ENCOUNTER (OUTPATIENT)
Dept: ULTRASOUND IMAGING | Facility: HOSPITAL | Age: 53
Discharge: HOME | End: 2023-03-10
Attending: STUDENT IN AN ORGANIZED HEALTH CARE EDUCATION/TRAINING PROGRAM

## 2023-03-10 DIAGNOSIS — E04.2 MULTIPLE THYROID NODULES: ICD-10-CM

## 2023-03-10 RX ORDER — LIDOCAINE HYDROCHLORIDE 10 MG/ML
5 INJECTION, SOLUTION EPIDURAL; INFILTRATION; INTRACAUDAL; PERINEURAL ONCE
Status: COMPLETED | OUTPATIENT
Start: 2023-03-10 | End: 2023-03-10

## 2023-03-10 RX ADMIN — LIDOCAINE HYDROCHLORIDE 5 ML: 10 INJECTION, SOLUTION EPIDURAL; INFILTRATION; INTRACAUDAL; PERINEURAL at 09:41

## 2023-03-31 ENCOUNTER — OFFICE VISIT (OUTPATIENT)
Dept: URGENT CARE | Facility: CLINIC | Age: 53
End: 2023-03-31

## 2023-03-31 VITALS
BODY MASS INDEX: 41.28 KG/M2 | DIASTOLIC BLOOD PRESSURE: 86 MMHG | WEIGHT: 263 LBS | OXYGEN SATURATION: 98 % | SYSTOLIC BLOOD PRESSURE: 133 MMHG | TEMPERATURE: 97.4 F | HEIGHT: 67 IN | HEART RATE: 92 BPM | RESPIRATION RATE: 18 BRPM

## 2023-03-31 DIAGNOSIS — J06.9 VIRAL URI WITH COUGH: Primary | ICD-10-CM

## 2023-03-31 RX ORDER — BENZONATATE 200 MG/1
200 CAPSULE ORAL 3 TIMES DAILY PRN
Qty: 20 CAPSULE | Refills: 0 | Status: SHIPPED | OUTPATIENT
Start: 2023-03-31 | End: 2023-04-14

## 2023-03-31 NOTE — PROGRESS NOTES
[id number]        NAME: Jose Luis Coon is a 47 y o  female  : 1970    MRN: [id number]  DATE: 2023  TIME: 5:07 PM    Assessment and Plan   Viral URI with cough [J06 9]  1  Viral URI with cough  benzonatate (TESSALON) 200 MG capsule            Patient Instructions   Drink plenty of fluids  May use over the counter cold medications for symptomatic treatment  Do not use medications with Pseudoephedrine or Phenylphrine if you have high blood pressure because it may worsen your blood pressure  Follow up with your PCP in 3-5 days if your symptoms do not improve or if you have any concerns  Go to the ER if symptoms become severe  Follow up with PCP in 3-5 days  Proceed to  ER if symptoms worsen  Chief Complaint     Chief Complaint   Patient presents with   • Cold Like Symptoms     Starting 3 days ago headache, sinus pressure, sneezing, cough  Home covid test negative today  History of Present Illness       Patient is a 56-year-old female having a past medical history of hypertension, von Willebrand disease, and seasonal allergies presents the office planing of headache, sinus pain and pressure, congestion, sneezing, and cough for 3 days  She denies fever, chills, sore throat, SOB, CP, nausea, vomiting, abdominal pain or rashes  At home COVID test negative  Review of Systems   Review of Systems   Constitutional: Negative for fever  HENT: Positive for congestion, postnasal drip and rhinorrhea  Negative for sore throat  Respiratory: Positive for cough  Negative for shortness of breath  Cardiovascular: Negative for chest pain and palpitations  Gastrointestinal: Negative for abdominal pain, diarrhea, nausea and vomiting  Skin: Negative for rash  Neurological: Positive for headaches  Negative for dizziness and light-headedness           Current Medications       Current Outpatient Medications:   •  amLODIPine (NORVASC) 10 mg tablet, Take 1 tablet (10 mg total) by mouth daily, Disp: 90 tablet, Rfl: 3  •  atorvastatin (LIPITOR) 10 mg tablet, TAKE 1 TABLET BY MOUTH EVERY DAY, Disp: 90 tablet, Rfl: 1  •  benzonatate (TESSALON) 200 MG capsule, Take 1 capsule (200 mg total) by mouth 3 (three) times a day as needed for cough for up to 14 days, Disp: 20 capsule, Rfl: 0  •  hydrochlorothiazide (HYDRODIURIL) 25 mg tablet, Take 1 tablet (25 mg total) by mouth daily, Disp: 90 tablet, Rfl: 3  •  lisinopril (ZESTRIL) 10 mg tablet, Take 1 tablet (10 mg total) by mouth daily, Disp: 90 tablet, Rfl: 3  •  Magnesium 250 MG TABS, Take 1 tablet (250 mg total) by mouth daily, Disp: 90 tablet, Rfl: 3  •  metoprolol tartrate (LOPRESSOR) 50 mg tablet, Take 1 tablet (50 mg total) by mouth 2 (two) times a day, Disp: 180 tablet, Rfl: 3  •  Ozempic, 2 MG/DOSE, 8 MG/3ML SOPN, INJECT 2 MG SUBCUTANEOUSLY ONE TIME PER WEEK, Disp: 6 mL, Rfl: 3  •  cholecalciferol (VITAMIN D3) 1,000 units tablet, Take 2 tablets (2,000 Units total) by mouth daily (Patient not taking: Reported on 8/4/2022), Disp: 60 tablet, Rfl: 0  •  cycloSPORINE (RESTASIS) 0 05 % ophthalmic emulsion, 1 drop 2 (two) times a day (Patient not taking: Reported on 12/13/2022), Disp: , Rfl:   •  fluticasone (FLONASE) 50 mcg/act nasal spray,   (Patient not taking: Reported on 12/13/2022), Disp: , Rfl: 4  •  pantoprazole (PROTONIX) 20 mg tablet, Take 20 mg by mouth daily (Patient not taking: Reported on 12/13/2022), Disp: , Rfl:     Current Allergies     Allergies as of 03/31/2023   • (No Known Allergies)            The following portions of the patient's history were reviewed and updated as appropriate: allergies, current medications, past family history, past medical history, past social history, past surgical history and problem list      Past Medical History:   Diagnosis Date   • COVID-19    • Diverticulosis    • Dry eye    • Hypertension    • Seasonal allergies    • Thyroid nodule    • Von Willebrand disease        Past Surgical History: "  Procedure Laterality Date   • ARTHROSCOPIC REPAIR ACL Left    • CHOLECYSTECTOMY     • HYSTERECTOMY  2010    partial hysterectomy   • IR BIOPSY THYROID     • KNEE ARTHROSCOPY Right    • REDUCTION MAMMAPLASTY Bilateral 2016   • US GUIDED THYROID BIOPSY  03/10/2023       Family History   Problem Relation Age of Onset   • Hypertension Mother    • Hyperlipidemia Mother    • Diabetes type II Mother    • Breast cancer Mother    • Thyroid cancer Mother    • Hypertension Father    • Heart attack Father    • Hyperlipidemia Father    • Lung cancer Father    • Hypertension Maternal Grandmother    • Cancer Maternal Grandmother    • No Known Problems Maternal Grandfather    • No Known Problems Paternal Grandmother    • No Known Problems Paternal Grandfather    • Skin cancer Maternal Aunt    • No Known Problems Paternal Aunt          Medications have been verified  Objective   /86   Pulse 92   Temp (!) 97 4 °F (36 3 °C)   Resp 18   Ht 5' 7\" (1 702 m)   Wt 119 kg (263 lb)   SpO2 98%   BMI 41 19 kg/m²   No LMP recorded  Patient has had a hysterectomy  Physical Exam     Physical Exam  Vitals and nursing note reviewed  Constitutional:       Appearance: Normal appearance  She is well-developed  HENT:      Head: Normocephalic and atraumatic  Right Ear: Tympanic membrane, ear canal and external ear normal       Left Ear: Tympanic membrane, ear canal and external ear normal       Nose: Congestion and rhinorrhea present  Mouth/Throat:      Pharynx: Uvula midline  Eyes:      General: Lids are normal       Conjunctiva/sclera: Conjunctivae normal       Pupils: Pupils are equal, round, and reactive to light  Cardiovascular:      Rate and Rhythm: Normal rate and regular rhythm  Pulses: Normal pulses  Heart sounds: Normal heart sounds  No murmur heard  No friction rub  No gallop  Pulmonary:      Effort: Pulmonary effort is normal       Breath sounds: Normal breath sounds   No wheezing, " rhonchi or rales  Musculoskeletal:         General: Normal range of motion  Cervical back: Neck supple  Lymphadenopathy:      Cervical: No cervical adenopathy  Skin:     General: Skin is warm and dry  Capillary Refill: Capillary refill takes less than 2 seconds  Neurological:      Mental Status: She is alert

## 2023-04-24 ENCOUNTER — APPOINTMENT (OUTPATIENT)
Dept: LAB | Facility: HOSPITAL | Age: 53
End: 2023-04-24

## 2023-04-24 DIAGNOSIS — I10 ESSENTIAL HYPERTENSION: ICD-10-CM

## 2023-04-24 DIAGNOSIS — E11.9 TYPE 2 DIABETES MELLITUS WITHOUT COMPLICATION, WITHOUT LONG-TERM CURRENT USE OF INSULIN (HCC): ICD-10-CM

## 2023-04-24 LAB
ANION GAP SERPL CALCULATED.3IONS-SCNC: 8 MMOL/L (ref 4–13)
BUN SERPL-MCNC: 6 MG/DL (ref 5–25)
CALCIUM SERPL-MCNC: 8.9 MG/DL (ref 8.4–10.2)
CHLORIDE SERPL-SCNC: 104 MMOL/L (ref 96–108)
CO2 SERPL-SCNC: 28 MMOL/L (ref 21–32)
CREAT SERPL-MCNC: 0.64 MG/DL (ref 0.6–1.3)
GFR SERPL CREATININE-BSD FRML MDRD: 102 ML/MIN/1.73SQ M
GLUCOSE P FAST SERPL-MCNC: 136 MG/DL (ref 65–99)
POTASSIUM SERPL-SCNC: 3.8 MMOL/L (ref 3.5–5.3)
SODIUM SERPL-SCNC: 140 MMOL/L (ref 135–147)

## 2023-04-25 ENCOUNTER — OFFICE VISIT (OUTPATIENT)
Dept: ENDOCRINOLOGY | Facility: CLINIC | Age: 53
End: 2023-04-25

## 2023-04-25 VITALS
BODY MASS INDEX: 41.81 KG/M2 | DIASTOLIC BLOOD PRESSURE: 82 MMHG | HEIGHT: 67 IN | SYSTOLIC BLOOD PRESSURE: 136 MMHG | HEART RATE: 90 BPM | WEIGHT: 266.4 LBS

## 2023-04-25 DIAGNOSIS — E11.9 TYPE 2 DIABETES MELLITUS WITHOUT COMPLICATION, WITHOUT LONG-TERM CURRENT USE OF INSULIN (HCC): Primary | ICD-10-CM

## 2023-04-25 DIAGNOSIS — E78.2 MIXED HYPERLIPIDEMIA: ICD-10-CM

## 2023-04-25 DIAGNOSIS — E04.1 THYROID NODULE: ICD-10-CM

## 2023-04-25 DIAGNOSIS — C73 THYROID CANCER (HCC): ICD-10-CM

## 2023-04-25 DIAGNOSIS — I10 ESSENTIAL HYPERTENSION: ICD-10-CM

## 2023-04-25 LAB
EST. AVERAGE GLUCOSE BLD GHB EST-MCNC: 180 MG/DL
HBA1C MFR BLD: 7.9 %

## 2023-04-25 NOTE — PROGRESS NOTES
Wendy Mayo 47 y o  female MRN: [id number]    Encounter: [id number]      Assessment/Plan     1  Type 2 diabetes - worsening, possibly on account of dietary indiscretions  No changes recommended to diabetes regimen, will focus on lifestyle interventions primarily  Encourage routine SMBG monitoring, carb consistent diet  Will plan monitoring of A1c in several months  Consider escalation of therapy at that time if no improvement of control  Call with concerns  2  Hypertension - stable  Continue current Rx      3  Hyperlipidemia - continue statin  Triglycerides high on last check  Continue to encourage optimal diet with focus on low saturated fat, also reduced refined carbs, modest EtOH, and better DM control  4  Obesity - continue to encourage optimized diet and lifestyle  Chary's night shift schedule may be contributory    5  Thyroid nodule / [address] suspicious - discussed case with Dr Tayler Palacios at [facility]  Hurthle cells can affect afirma results, but either way Hurthle cell adenoma can be precursor to follicular cancer later on  Will refer to surgical oncology for consideration of ipsilateral lobectomy vs total thyroidectomy   Will recommend lymph node mapping US as well    Problem List Items Addressed This Visit        Endocrine    Type 2 diabetes mellitus without complication, without long-term current use of insulin ([facility] [address] ) - Primary    Relevant Orders    Basic metabolic panel Lab Collect    HEMOGLOBIN A1C W/ EAG ESTIMATION Lab Collect    Lipid Panel with Direct LDL reflex Lab Collect    Microalbumin / creatinine urine ratio Lab Collect       Cardiovascular and Mediastinum    Essential hypertension       Other    Hypertriglyceridemia   Other Visit Diagnoses     Thyroid nodule        Relevant Orders    T4, free Lab Collect    TSH, 3rd generation Lab Collect    US head neck lymph node mapping    Thyroid cancer [facility])        Relevant Orders    Ambulatory referral to Surgical Oncology    US head neck lymph node mapping        RTC 3-mo    CC: Diabetes    History of Present Illness     HPI:    Jesús Scanlon returns for follow up of diabetes  As always, she is here today with her mother  For diabetes she is taking ozempic 2 mg weekly  She had prior intolerance to metformin  No meter or log  BG reportedly stable on home monitoring, however worsening A1c noted  Patient acknowledges changes in dietary patterns over past few months, which has since rectified  No hyperglycemic symptoms  For hypertension she takes amlodipine 10 mg daily, HCTZ 25 mg daily, lisinopril 10 mg daily, and metoprolol 50 mg bid  For hyperlipidemia she takes lipitor 10 mg daily  Since last visit, Nabila Linton completed thyroid US, revealing significant interval growth of left sided nodule from prior comparison, TR4 designation due to taller than wide appearance  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  Reflex afirma resulted suspicious for malignancy 50% risk  Review of Systems   Constitutional: Negative for diaphoresis and unexpected weight change  HENT: Negative for trouble swallowing and voice change  Cardiovascular: Negative for palpitations  Gastrointestinal: Negative for nausea and vomiting  Endocrine: Negative for polydipsia and polyuria  Psychiatric/Behavioral: Negative for agitation and behavioral problems  All other systems reviewed and are negative        Historical Information   Past Medical History:   Diagnosis Date   • COVID-19    • Diverticulosis    • Dry eye    • Hypertension    • Seasonal allergies    • Thyroid nodule    • Von Willebrand disease ([facility] [address] )      Past Surgical History:   Procedure Laterality Date   • ARTHROSCOPIC REPAIR ACL Left    • CHOLECYSTECTOMY     • HYSTERECTOMY  2010    partial hysterectomy   • IR BIOPSY THYROID     • KNEE ARTHROSCOPY Right    • REDUCTION MAMMAPLASTY Bilateral 2016   • US GUIDED THYROID BIOPSY  03/10/2023     Social History   Social History     Substance and Sexual Activity   Alcohol Use Not Currently    Comment: rare, holidays     Social History     Substance and Sexual Activity   Drug Use Never     Social History     Tobacco Use   Smoking Status Never   Smokeless Tobacco Never     Family History:   Family History   Problem Relation Age of Onset   • Hypertension Mother    • Hyperlipidemia Mother    • Diabetes type II Mother    • Breast cancer Mother    • Thyroid cancer Mother    • Hypertension Father    • Heart attack Father    • Hyperlipidemia Father    • Lung cancer Father    • Hypertension Maternal Grandmother    • Cancer Maternal Grandmother    • No Known Problems Maternal Grandfather    • No Known Problems Paternal Grandmother    • No Known Problems Paternal Grandfather    • Skin cancer Maternal Aunt    • No Known Problems Paternal Aunt        Meds/Allergies   Current Outpatient Medications   Medication Sig Dispense Refill   • amLODIPine (NORVASC) 10 mg tablet Take 1 tablet (10 mg total) by mouth daily 90 tablet 3   • atorvastatin (LIPITOR) 10 mg tablet TAKE 1 TABLET BY MOUTH EVERY DAY 90 tablet 1   • cholecalciferol (VITAMIN D3) 1,000 units tablet Take 2 tablets (2,000 Units total) by mouth daily (Patient taking differently: Take 1,000 Units by mouth daily) 60 tablet 0   • hydrochlorothiazide (HYDRODIURIL) 25 mg tablet Take 1 tablet (25 mg total) by mouth daily 90 tablet 3   • lisinopril (ZESTRIL) 10 mg tablet Take 1 tablet (10 mg total) by mouth daily 90 tablet 3   • Magnesium 250 MG TABS Take 1 tablet (250 mg total) by mouth daily 90 tablet 3   • metoprolol tartrate (LOPRESSOR) 50 mg tablet Take 1 tablet (50 mg total) by mouth 2 (two) times a day 180 tablet 3   • Ozempic, 2 MG/DOSE, 8 MG/3ML SOPN INJECT 2 MG SUBCUTANEOUSLY ONE TIME PER WEEK 6 mL 3   • cycloSPORINE (RESTASIS) 0 05 % ophthalmic emulsion 1 drop 2 (two) times a day (Patient not taking: Reported on 12/13/2022)     • fluticasone (FLONASE) 50 mcg/act nasal spray   (Patient "not taking: Reported on 12/13/2022)  4   • pantoprazole (PROTONIX) 20 mg tablet Take 20 mg by mouth daily (Patient not taking: Reported on 12/13/2022)       No current facility-administered medications for this visit  No Known Allergies    Objective   Vitals: Blood pressure 136/82, pulse 90, height 5' 7\" (1 702 m), weight 121 kg (266 lb 6 4 oz)  Physical Exam  Vitals and nursing note reviewed  Constitutional:       Appearance: Normal appearance  Comments: Wears glasses  HENT:      Head: Normocephalic  Mouth/Throat:      Mouth: Mucous membranes are moist    Eyes:      General: No scleral icterus  Conjunctiva/sclera: Conjunctivae normal    Neck:      Thyroid: No thyroid mass, thyromegaly or thyroid tenderness  Cardiovascular:      Rate and Rhythm: Normal rate and regular rhythm  Pulmonary:      Effort: Pulmonary effort is normal       Breath sounds: Normal breath sounds  Skin:     General: Skin is warm and dry  Neurological:      General: No focal deficit present  Mental Status: She is alert  Psychiatric:         Mood and Affect: Mood normal            The history was obtained from the review of the chart, patient      Lab Results:   Lab Results   Component Value Date/Time    Hemoglobin A1C 7 9 (H) 04/24/2023 08:21 AM    Hemoglobin A1C 6 8 (H) 11/29/2022 08:42 AM    Hemoglobin A1C 6 7 (H) 07/26/2022 10:50 AM    BUN 6 04/24/2023 08:21 AM    BUN 13 11/29/2022 08:42 AM    BUN 12 10/03/2022 11:32 AM    Potassium 3 8 04/24/2023 08:21 AM    Potassium 4 1 11/29/2022 08:42 AM    Potassium 3 9 10/03/2022 11:32 AM    Chloride 104 04/24/2023 08:21 AM    Chloride 103 11/29/2022 08:42 AM    Chloride 103 10/03/2022 11:32 AM    CO2 28 04/24/2023 08:21 AM    CO2 28 11/29/2022 08:42 AM    CO2 32 10/03/2022 11:32 AM    Creatinine 0 64 04/24/2023 08:21 AM    Creatinine 0 82 11/29/2022 08:42 AM    Creatinine 0 63 10/03/2022 11:32 AM    HDL, Direct 39 (L) 07/26/2022 10:50 AM    Triglycerides 233 (H) " 07/26/2022 10:50 AM     Lab Results   Component Value Date    LDLCALC 54 07/26/2022       3 10 23      Final Diagnosis   A  & B  Thyroid, Left, Lower Pole: (Thin-Prep and smears)  Follicular neoplasm/Suspicious for follicular neoplasm ([place] Category IV)  - See note  Specimen consists almost exclusively of Hurthle cells  Colloid and mixed inflammatory cells      Satisfactory for evaluation      Note:  (1) As reported in the [facility] for Reporting Thyroid Cytopathology* this diagnostic category has demonstrated anywhere from 25-40% risk of malignancy being found in subsequent resections and/or FNA  This risk of malignancy is expected to change due to the usage of the surgical pathology diagnosis of “non-invasive follicular thyroid neoplasm with papillary-like nuclear features (NIFTP)  ”  The anticipated risk of malignancy secondary to NIFTP is 10-40%  The histologic follow-up of cases diagnosed as follicular neoplasm/suspicious for follicular neoplasm includes follicular adenoma, follicular carcinoma, and follicular variant of papillary thyroid carcinoma including the recently described indolent counterpart, NIFTP  The manual reports that the usual management following this diagnosis is genetic testing or lobectomy  Ultimately, clinical/imaging correlation for this patient is needed in arriving at the actual management plan             Imaging Studies:     11/6/2020    THYROID ULTRASOUND     INDICATION:    E04 1: Nontoxic single thyroid nodule      Follow-up nodules       COMPARISON:  4/9/2019 outside study; 7/24/2017; CT neck from 6/5/2020     TECHNIQUE:   Ultrasound of the thyroid was performed with a high frequency linear transducer in transverse and sagittal planes including volumetric imaging sweeps as well as traditional still imaging technique      FINDINGS:  Mildly heterogeneous gland     Right lobe:  4 x 1 6 x 1 7 cm   5 2 mL  Left lobe:  4 4 x 1 5 x 1 4 cm   4 5 mL  Isthmus:  0 2 cm      Nodule #1  Image 8  Right midpole measuring 1 1 x 0 7 x 1 cm  Previously 0 9 x 0 5 x 0 6   COMPOSITION:  0 points, spongiform  ECHOGENICITY:  Not applicable when spongiform composition  SHAPE:  0 points, wider-than-tall  MARGIN: 0 points, smooth  ECHOGENIC FOCI:  0 points, none or large comet-tail artifacts  TI-RADS Classification: TR 1 (0 points), Benign  No FNA      Nodule #2  Image 30  Left mid pole measuring 0 9 x 0 7 x 0 7 cm  This is visible on the prior study on volumetric sweeps and appears unchanged in size  This could also simply represent a lobule rather than a true nodule        COMPOSITION:  2 points, solid or almost completely solid   ECHOGENICITY:  1 point, hyperechoic or isoechoic  SHAPE:  0 points, wider-than-tall  MARGIN: 0 points, smooth  ECHOGENIC FOCI:  0 points, none or large comet-tail artifacts  TI-RADS Classification: TR 3 (3 points), FNA if >2 5 cm  Follow if >1 5 cm      Nodule #3  Image 35  Left lower pole measuring 1 0 x 0 9 x 0 7 cm   0 9 x 0 9 x 0 9 in 2019 (remeasured similarly to the current study)  0 9 x 0 8 x 0 7 cm in 2017  COMPOSITION:  2 points, solid or almost completely solid   ECHOGENICITY:  1 point, hyperechoic or isoechoic  SHAPE:  3 points, taller-than-wide  MARGIN: 0 points, smooth  ECHOGENIC FOCI:  0 points, none or large comet-tail artifacts  TI-RADS Classification: TR 4 (4-6 points), FNA if > 1 5 cm  Follow if > 1cm           IMPRESSION:     TR 4 lesion in the lower pole the left thyroid does not reach size criteria for biopsy and appears not significantly changed in size given differences in measurement technique  Follow-up in one year is advised      Reference: ACR Thyroid Imaging, Reporting and Data System (TI-RADS): White Paper of the [employer]   J AM Russ Radiol 5078;84:[id number]  (additional recommendations based on American Thyroid Association 2015 guidelines )    Portions of the record may have been "created with voice recognition software  Occasional wrong word or \"sound a like\" substitutions may have occurred due to the inherent limitations of voice recognition software  Read the chart carefully and recognize, using context, where substitutions have occurred    "

## 2023-04-27 ENCOUNTER — TELEPHONE (OUTPATIENT)
Dept: ENDOCRINOLOGY | Facility: CLINIC | Age: 53
End: 2023-04-27

## 2023-04-27 ENCOUNTER — TELEPHONE (OUTPATIENT)
Dept: HEMATOLOGY ONCOLOGY | Facility: CLINIC | Age: 53
End: 2023-04-27

## 2023-04-27 NOTE — TELEPHONE ENCOUNTER
I called Chary to schedule a new patient consultation with [facility] Surgical Oncology in response to a referral sent to our department  I left a voicemail making patient aware of their referral and instructing them to call [facility] at [phone] to schedule  Another attempt will be made to schedule patient

## 2023-04-27 NOTE — TELEPHONE ENCOUNTER
Attempted to reach patient on both listed lines (mobile and home) to no reply  Both calls rang to automated messages  Left VM on mobile line indicating request for follow up call to discuss MGMT for thyroid node biopsy  Call back number provided [phone]   Will try to reach patient again later today if no response sooner

## 2023-04-28 ENCOUNTER — DOCUMENTATION (OUTPATIENT)
Dept: HEMATOLOGY ONCOLOGY | Facility: CLINIC | Age: 53
End: 2023-04-28

## 2023-04-28 ENCOUNTER — TELEPHONE (OUTPATIENT)
Dept: HEMATOLOGY ONCOLOGY | Facility: CLINIC | Age: 53
End: 2023-04-28

## 2023-04-28 NOTE — PROGRESS NOTES
Intake received/ Chart reviewed for services completed outside of [facility]    Pathology completed: 3/10/23 [facility]    Imaging completed: [address] thyroid 1/31/23, 3/10/23[facility]    All records needed are in patients chart  No records retrieval needed at this time

## 2023-04-28 NOTE — TELEPHONE ENCOUNTER
I called Chary to schedule a new patient consultation with [facility] Surgical Oncology in response to a referral sent to our department  I left a voicemail making patient aware of their referral and instructing them to call [place] at [phone] to schedule  Another attempt will be made to schedule patient

## 2023-04-29 LAB
ALDOST SERPL-MCNC: 3.3 NG/DL (ref 0–30)
ALDOST/RENIN PLAS-RTO: 1.4 {RATIO} (ref 0–30)
RENIN PLAS-CCNC: 2.34 NG/ML/HR (ref 0.17–5.38)

## 2023-04-30 DIAGNOSIS — E11.9 TYPE 2 DIABETES MELLITUS WITHOUT COMPLICATION, WITHOUT LONG-TERM CURRENT USE OF INSULIN (HCC): ICD-10-CM

## 2023-04-30 DIAGNOSIS — E11.9 NON-INSULIN DEPENDENT TYPE 2 DIABETES MELLITUS (HCC): ICD-10-CM

## 2023-05-01 ENCOUNTER — CONSULT (OUTPATIENT)
Dept: SURGICAL ONCOLOGY | Facility: CLINIC | Age: 53
End: 2023-05-01

## 2023-05-01 VITALS
RESPIRATION RATE: 18 BRPM | HEART RATE: 98 BPM | SYSTOLIC BLOOD PRESSURE: 132 MMHG | HEIGHT: 67 IN | OXYGEN SATURATION: 95 % | WEIGHT: 266 LBS | DIASTOLIC BLOOD PRESSURE: 84 MMHG | BODY MASS INDEX: 41.75 KG/M2 | TEMPERATURE: 97.1 F

## 2023-05-01 DIAGNOSIS — E04.1 THYROID NODULE: Primary | ICD-10-CM

## 2023-05-01 DIAGNOSIS — C73 THYROID CANCER (HCC): ICD-10-CM

## 2023-05-01 DIAGNOSIS — D68.00 VON WILLEBRAND DISEASE (HCC): ICD-10-CM

## 2023-05-01 RX ORDER — TRAMADOL HYDROCHLORIDE 50 MG/1
50 TABLET ORAL EVERY 6 HOURS PRN
Qty: 10 TABLET | Refills: 0 | Status: SHIPPED | OUTPATIENT
Start: 2023-05-01

## 2023-05-01 RX ORDER — ATORVASTATIN CALCIUM 10 MG/1
TABLET, FILM COATED ORAL
Qty: 90 TABLET | Refills: 1 | Status: SHIPPED | OUTPATIENT
Start: 2023-05-01

## 2023-05-01 NOTE — PROGRESS NOTES
Surgical Oncology Consultation    [address]  [facility]  [address]  [place] [address]    Patient:  Zia Wesley  1970  [id number]    Primary Care provider:  Maykel Valderrama DO  [address] [facility] [address]    Referring provider:  Ja Steen DO  [address] [facility]    Diagnoses and all orders for this visit:    Thyroid nodule  -     TSH, 3rd generation; Future  -     T4, free; Future    Thyroid cancer 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  She comes today for additional recs  Essentially asx  No hoarseness, trouble swallowing, pain with swallowing, lumps or bumps of the head or neck  No hx XRT to the neck  Hx vWF - has not had significant bleeding with previous procedures  Review of Systems  Complete ROS Surg Onc:   Constitutional: The patient denies new or recent history of general fatigue, no recent weight loss, no change in appetite  Eyes: No complaints of visual problems, no scleral icterus  ENT: No complaints of ear pain, no hoarseness, no difficulty swallowing,  no tinnitus and no new masses in head, oral cavity, or neck  Cardiovascular: No complaints of chest pain, no palpitations, no ankle edema  Respiratory: No complaints of shortness of breath, no cough     Gastrointestinal: No complaints of jaundice, no bloody stools, no pale stools  Genitourinary: No complaints of dysuria, no hematuria, no nocturia, no frequent urination, no urethral discharge  Musculoskeletal: No complaints of weakness, paralysis, joint stiffness or arthralgias  Integumentary: No complaints of rash, no new lesions  Neurological: No complaints of convulsions, no seizures, no dizziness  Hematologic/Lymphatic: No complaints of easy bruising  Endocrine:  No hot or cold intolerance  No polydipsia, polyphagia, or polyuria  Allergy/immunology:  No environmental allergies  No food allergies  Not immunocompromised        Patient Active Problem List   Diagnosis    Essential hypertension    Morbid obesity ([facility] [address] )    Type 2 diabetes mellitus without complication, without long-term current use of insulin (HCC)    Von Willebrand disease ([facility] [address] )    Acid reflux    Hypertriglyceridemia    Multiple thyroid nodules    Pulmonary nodule    Mixed hyperlipidemia    Thyroid nodule     Past Medical History:   Diagnosis Date    COVID-19     Diverticulosis     Dry eye     Hypertension     Seasonal allergies     Thyroid nodule     [facility]      Past Surgical History:   Procedure Laterality Date    ARTHROSCOPIC REPAIR ACL Left     CHOLECYSTECTOMY      HYSTERECTOMY  2010    partial hysterectomy    IR BIOPSY THYROID      KNEE ARTHROSCOPY Right     REDUCTION MAMMAPLASTY Bilateral 2016    US GUIDED THYROID BIOPSY  03/10/2023     Family History   Problem Relation Age of Onset    Hypertension Mother     Hyperlipidemia Mother     Diabetes type II Mother     Breast cancer Mother     Thyroid cancer Mother     Hypertension Father     Heart attack Father     Hyperlipidemia Father     Lung cancer Father     Hypertension Maternal Grandmother     Cancer Maternal Grandmother     No Known Problems Maternal Grandfather     No Known Problems Paternal Grandmother     No Known Problems Paternal Grandfather     Skin cancer Maternal Aunt     No Known Problems Paternal Aunt      Social History     Socioeconomic History    Marital status: Single     Spouse name: Not on file    Number of children: Not on file    Years of education: Not on file    Highest education level: Not on file   Occupational History    Not on file   Tobacco Use    Smoking status: Never    Smokeless tobacco: Never   Vaping Use    Vaping Use: Never used   Substance and Sexual Activity    Alcohol use: Not Currently     Comment: rare, holidays    Drug use: Never    Sexual activity: Not on file     Comment: Defer   Other Topics Concern    Not on file   Social History Narrative    Not on file     Social Determinants of Health     Financial Resource Strain: Not on file   Food Insecurity: Not on file   Transportation Needs: Not on file   Physical Activity: Not on file   Stress: Not on file   Social Connections: Not on file   Intimate Partner Violence: Not on file   Housing Stability: Not on file       Current Outpatient Medications:     amLODIPine (NORVASC) 10 mg tablet, Take 1 tablet (10 mg total) by mouth daily, Disp: 90 tablet, Rfl: 3    atorvastatin (LIPITOR) 10 mg tablet, TAKE 1 TABLET BY MOUTH EVERY DAY, Disp: 90 tablet, Rfl: 1    cholecalciferol (VITAMIN D3) 1,000 units tablet, Take 2 tablets (2,000 Units total) by mouth daily, Disp: 60 tablet, Rfl: 0    metoprolol tartrate (LOPRESSOR) 50 mg tablet, Take 1 tablet (50 mg total) by mouth 2 (two) times a day, Disp: 180 tablet, Rfl: 3    Ozempic, 2 MG/DOSE, 8 MG/3ML SOPN, INJECT 2 MG SUBCUTANEOUSLY ONE TIME PER WEEK, Disp: 6 mL, Rfl: 3    cycloSPORINE (RESTASIS) 0 05 % ophthalmic emulsion, 1 drop 2 (two) times a day (Patient not taking: Reported on 12/13/2022), Disp: , Rfl:     fluticasone (FLONASE) 50 mcg/act nasal spray,   (Patient not taking: Reported on 12/13/2022), Disp: , Rfl: 4    hydrochlorothiazide (HYDRODIURIL) 25 mg tablet, Take 1 tablet (25 mg total) by mouth daily, Disp: 90 tablet, Rfl: 3    lisinopril (ZESTRIL) 10 mg tablet, Take 1 tablet (10 mg total) by mouth daily, Disp: 90 tablet, Rfl: 3    Magnesium 250 MG TABS, Take 1 tablet (250 mg total) by mouth daily, Disp: 90 tablet, Rfl: 3    pantoprazole (PROTONIX) 20 mg tablet, Take 20 mg by mouth daily (Patient not taking: Reported on 12/13/2022), Disp: , Rfl:   No Known Allergies    Vitals:    05/01/23 0845   BP: 132/84   Pulse: 98   Resp: 18   Temp: (!) 97 1 °F (36 2 °C)   SpO2: 95%       Physical Exam   General: Appears well, appears stated age  Skin: Warm, anicteric  HEENT: Normocephalic, atraumatic; sclera aniceteric, mucous membranes moist; cervical nodes without adenopathy  No nodules appreciated  Cardiopulmonary: RRR, Easy WOB, no BLE edema  Abd: Flat and soft, nontender, no masses appreciated, no hepatosplenomegaly  MSK: Symmetric, no cyanosis, no overt weakness  Lymphatic: No cervical, axillary or inguinal lymphadenopathy  Neuro: Affect appropriate, no gross motor abnormalities      Pathology:  Final Diagnosis   A  & B  Thyroid, Left, Lower Pole: (Thin-Prep and smears)  Follicular neoplasm/Suspicious for follicular neoplasm ([place] Category IV)  - See note  Specimen consists almost exclusively of Hurthle cells  Colloid and mixed inflammatory cells      Satisfactory for evaluation  Afirma 50% risk of malignancy    Labs: Reviewed in EPIC    Imaging  No results found  I independently reviewed and interpreted the above laboratory and imaging data, incl thyroid US, endo notes, path, afirma      Discussion/Summary:   We discussed today that this nodule has a relatively high risk of representing the most common type of thyroid cancer, a well-differentiated papillary or follicular cancer  I discussed that the typical treatment for this type of cancer would be a partial vs total thyroidectomy  We discussed that based on her ultrasound and biopsy results, I recommend a partial thyroidectomy   We also discussed that the final pathology will determine if additional treatment, with surgery or radioactive iodine, might be necessary  We discussed the natural history of well differentiated thyroid cancer, as well as the proposed procedure and expected postop course  We discussed the risks of left thyroidectomy to include wound healing complications, infection, bleeding, damage to nearby structures, voice hoarseness, voice pitch change, temporary or permanent calcium regulation disruption, surgical airway emergency  We also discussed the risks of surgery with general anesthesia to include MI, PE, stroke, DVT, respiratory failure, PE, death, other medical complication  The patient is at above average risk for complications related to surgery due to history of obesity and elevated A1C with DM II  The patient expresses understanding and would like to proceed

## 2023-05-02 ENCOUNTER — APPOINTMENT (OUTPATIENT)
Dept: LAB | Facility: HOSPITAL | Age: 53
End: 2023-05-02

## 2023-05-02 ENCOUNTER — HOSPITAL ENCOUNTER (OUTPATIENT)
Dept: RADIOLOGY | Facility: HOSPITAL | Age: 53
Discharge: HOME | End: 2023-05-02

## 2023-05-02 ENCOUNTER — OFFICE VISIT (OUTPATIENT)
Dept: LAB | Facility: HOSPITAL | Age: 53
End: 2023-05-02

## 2023-05-02 DIAGNOSIS — E04.1 THYROID NODULE: ICD-10-CM

## 2023-05-02 LAB
ATRIAL RATE: 95 BPM
BASOPHILS # BLD AUTO: 0.04 THOUSANDS/ΜL (ref 0–0.1)
BASOPHILS NFR BLD AUTO: 1 % (ref 0–1)
EOSINOPHIL # BLD AUTO: 0.21 THOUSAND/ΜL (ref 0–0.61)
EOSINOPHIL NFR BLD AUTO: 3 % (ref 0–6)
ERYTHROCYTE [DISTWIDTH] IN BLOOD BY AUTOMATED COUNT: 13.4 % (ref 11.6–15.1)
HCT VFR BLD AUTO: 43.6 % (ref 34.8–46.1)
HGB BLD-MCNC: 14 G/DL (ref 11.5–15.4)
IMM GRANULOCYTES # BLD AUTO: 0.05 THOUSAND/UL (ref 0–0.2)
IMM GRANULOCYTES NFR BLD AUTO: 1 % (ref 0–2)
LYMPHOCYTES # BLD AUTO: 2.23 THOUSANDS/ΜL (ref 0.6–4.47)
LYMPHOCYTES NFR BLD AUTO: 27 % (ref 14–44)
MCH RBC QN AUTO: 28.2 PG (ref 26.8–34.3)
MCHC RBC AUTO-ENTMCNC: 32.1 G/DL (ref 31.4–37.4)
MCV RBC AUTO: 88 FL (ref 82–98)
MONOCYTES # BLD AUTO: 0.53 THOUSAND/ΜL (ref 0.17–1.22)
MONOCYTES NFR BLD AUTO: 6 % (ref 4–12)
NEUTROPHILS # BLD AUTO: 5.34 THOUSANDS/ΜL (ref 1.85–7.62)
NEUTS SEG NFR BLD AUTO: 62 % (ref 43–75)
NRBC BLD AUTO-RTO: 0 /100 WBCS
P AXIS: 16 DEGREES
PLATELET # BLD AUTO: 368 THOUSANDS/UL (ref 149–390)
PMV BLD AUTO: 11.5 FL (ref 8.9–12.7)
PR INTERVAL: 192 MS
QRS AXIS: -9 DEGREES
QRSD INTERVAL: 74 MS
QT INTERVAL: 346 MS
QTC INTERVAL: 434 MS
RBC # BLD AUTO: 4.97 MILLION/UL (ref 3.81–5.12)
T WAVE AXIS: -3 DEGREES
T4 FREE SERPL-MCNC: 1.22 NG/DL (ref 0.76–1.46)
TSH SERPL DL<=0.05 MIU/L-ACNC: 0.87 UIU/ML (ref 0.45–4.5)
VENTRICULAR RATE: 95 BPM
WBC # BLD AUTO: 8.4 THOUSAND/UL (ref 4.31–10.16)

## 2023-05-03 ENCOUNTER — TELEPHONE (OUTPATIENT)
Dept: ANESTHESIOLOGY | Facility: CLINIC | Age: 53
End: 2023-05-03

## 2023-05-05 ENCOUNTER — OFFICE VISIT (OUTPATIENT)
Dept: CARDIOLOGY CLINIC | Facility: CLINIC | Age: 53
End: 2023-05-05
Payer: COMMERCIAL

## 2023-05-05 VITALS
HEART RATE: 80 BPM | DIASTOLIC BLOOD PRESSURE: 84 MMHG | BODY MASS INDEX: 41.47 KG/M2 | HEIGHT: 67 IN | WEIGHT: 264.2 LBS | OXYGEN SATURATION: 99 % | SYSTOLIC BLOOD PRESSURE: 124 MMHG

## 2023-05-05 DIAGNOSIS — E11.9 TYPE 2 DIABETES MELLITUS WITHOUT COMPLICATION, WITHOUT LONG-TERM CURRENT USE OF INSULIN (HCC): ICD-10-CM

## 2023-05-05 DIAGNOSIS — E78.2 MIXED HYPERLIPIDEMIA: ICD-10-CM

## 2023-05-05 DIAGNOSIS — I10 ESSENTIAL HYPERTENSION: Primary | ICD-10-CM

## 2023-05-05 DIAGNOSIS — E66.01 MORBID OBESITY (HCC): ICD-10-CM

## 2023-05-05 DIAGNOSIS — Z01.810 PREOPERATIVE CARDIOVASCULAR EXAMINATION: ICD-10-CM

## 2023-05-05 DIAGNOSIS — E04.2 MULTIPLE THYROID NODULES: ICD-10-CM

## 2023-05-05 PROCEDURE — 99244 OFF/OP CNSLTJ NEW/EST MOD 40: CPT | Performed by: INTERNAL MEDICINE

## 2023-05-05 NOTE — PATIENT INSTRUCTIONS
Continue current medications. Blood pressure is perfect. Continue magnesium. ECG done in the hospital lab was abnormal due to lead misplacement. ECG today is normal.   Cristobal Holloway from a cardiac standpoint to proceed with surgery as planned.

## 2023-05-05 NOTE — PROGRESS NOTES
Cardiology Office Visit    Angela Lebron  [id number]  1970     [facility]  [address] [place] [id number] [phone]      Dear Sera Sanchez DO (Inactive),    I had the pleasure of seeing your patient at our [facility] [place] office today 5/5/2023. As you know she is a pleasant 47 y.o. female with a medical history as described below. Reason for office visit:  Follow-up hypertension. 1. Essential hypertension  -     POCT ECG    2. Morbid obesity ([address])    3. Type 2 diabetes mellitus without complication, without long-term current use of insulin ([address])    4. Mixed hyperlipidemia    5. Multiple thyroid nodules    6. Preoperative cardiovascular examination       Plan:  I have recommended that she continue current medications. Blood pressure is well controlled. Continue magnesium. ECG done in the hospital lab was abnormal likely due to lead misplacement (small R waves noted on repeat ECG today which does not suggest prior anteroseptal MI.   ECG today is normal.   Ok from a cardiac standpoint to proceed with surgery as planned. Will plan routine follow up in the office unless symptoms warrant earlier assessment.   __________________________________        HPI   Patient previously followed with Dr. Rangel Chand who has since left the practice. Patient has a history of hypertension and has been on blood pressure medications since around the year 2000 (in her late 19's). Echocardiogram 05/2019 showed normal LV function with an EF of 60% and grade 2 diastolic dysfunction. Family history of premature coronary artery disease in her father who had an MI in his 46s. Patient was last seen 09/16/2019 at which time she was doing well. 1/26/2021:  Patient was noted to have [place] in December and was hospitalized in the ICU from 12/29/20-1/2/21.   She was discharged home on her normal blood pressure medications with the exception of HCTZ being stopped. It appears that she was hypokalemic during her stay and I suspect this is why this was discontinued. She was discharged home on oxygen. She was also discharged home on steroids. She is currently only using oxygen if needed and admits that over the last few days she has not needed to use this. She is using an incentive spirometer at home. She does continue to have dyspnea on exertion. She does continue to have cough. She has returned to work. She denies any overt chest pain. 5/3/2021: Patient presents for follow up. She is no longer on oxygen. She did have a repeat CT scan 4/8/2021 which showed resolved infiltrates and improved mediastinal adenopathy. Scattered lung nodules noted. 9 month follow up was recommended. * Patient seen by [address] [place] [address]    10/3/2022: Patient presents to the office for follow up. She has been getting some leg cramps. Occasional palpitations. She is having some leg cramps up in her thigh. No lightheadedness or dizziness. She does not drink enough at work. Breathing is back to normal. She feels tired all the time. She does work 6 pm to 6 am.     5/5/2023: Radha Maldonado presents to the office today for follow up. She is having surgery on her left thyroid by Dr. Cesar Bowling on 5/12/2023. Palpitations seem better and cramps are less frequent. She is drinking more water at work. Occasional positional lightheadedness. Breathing seems ok. March she had URI. ECG done 5/2/2023 noted to show anteroseptal MI. ECG repeated today shows very small R waves in the septal leads without evidence of infarct. Suspect leads were placed low on chest at time of hospital ECG. She denies any chest pain.      Patient Active Problem List   Diagnosis    Essential hypertension    Morbid obesity ([address])    Type 2 diabetes mellitus without complication, without long-term current use of insulin ([address])    Von Willebrand disease ([address])    Acid reflux    Hypertriglyceridemia    Multiple thyroid nodules Pulmonary nodule    Mixed hyperlipidemia    Thyroid nodule     Past Medical History:   Diagnosis Date    COVID-19     Diverticulosis     Dry eye     Hypertension     Seasonal allergies     Thyroid nodule     Von Willebrand disease ([address])      Social History     Socioeconomic History    Marital status: Single     Spouse name: Not on file    Number of children: Not on file    Years of education: Not on file    Highest education level: Not on file   Occupational History    Not on file   Tobacco Use    Smoking status: Never    Smokeless tobacco: Never   Vaping Use    Vaping Use: Never used   Substance and Sexual Activity    Alcohol use: Yes     Comment: social    Drug use: Never    Sexual activity: Not Currently     Comment: Defer   Other Topics Concern    Not on file   Social History Narrative    Not on file     Social Determinants of Health     Financial Resource Strain: Not on file   Food Insecurity: Not on file   Transportation Needs: Not on file   Physical Activity: Not on file   Stress: Not on file   Social Connections: Not on file   Intimate Partner Violence: Not on file   Housing Stability: Not on file      Family History   Problem Relation Age of Onset    Hypertension Mother     Hyperlipidemia Mother     Diabetes type II Mother     Breast cancer Mother     Thyroid cancer Mother     Hypertension Father     Heart attack Father     Hyperlipidemia Father     Lung cancer Father     Hypertension Maternal Grandmother     Cancer Maternal Grandmother     No Known Problems Maternal Grandfather     No Known Problems Paternal Grandmother     No Known Problems Paternal Grandfather     Skin cancer Maternal Aunt     No Known Problems Paternal Aunt      Past Surgical History:   Procedure Laterality Date    ARTHROSCOPIC REPAIR ACL Left     CHOLECYSTECTOMY      HYSTERECTOMY  2010    partial hysterectomy    IR BIOPSY THYROID      KNEE ARTHROSCOPY Right     REDUCTION MAMMAPLASTY Bilateral 2016    US GUIDED THYROID BIOPSY 03/10/2023       Current Outpatient Medications:     amLODIPine (NORVASC) 10 mg tablet, Take 1 tablet (10 mg total) by mouth daily, Disp: 90 tablet, Rfl: 3    atorvastatin (LIPITOR) 10 mg tablet, TAKE 1 TABLET BY MOUTH EVERY DAY, Disp: 90 tablet, Rfl: 1    cholecalciferol (VITAMIN D3) 1,000 units tablet, Take 2 tablets (2,000 Units total) by mouth daily, Disp: 60 tablet, Rfl: 0    hydrochlorothiazide (HYDRODIURIL) 25 mg tablet, Take 1 tablet (25 mg total) by mouth daily, Disp: 90 tablet, Rfl: 3    lisinopril (ZESTRIL) 10 mg tablet, Take 1 tablet (10 mg total) by mouth daily, Disp: 90 tablet, Rfl: 3    Magnesium 250 MG TABS, Take 1 tablet (250 mg total) by mouth daily, Disp: 90 tablet, Rfl: 3    metoprolol tartrate (LOPRESSOR) 50 mg tablet, Take 1 tablet (50 mg total) by mouth 2 (two) times a day, Disp: 180 tablet, Rfl: 3    Ozempic, 2 MG/DOSE, 8 MG/3ML SOPN, INJECT 2 MG SUBCUTANEOUSLY ONE TIME PER WEEK, Disp: 6 mL, Rfl: 3    traMADol (Ultram) 50 mg tablet, Take 1 tablet (50 mg total) by mouth every 6 (six) hours as needed for moderate pain, Disp: 10 tablet, Rfl: 0    cycloSPORINE (RESTASIS) 0.05 % ophthalmic emulsion, 1 drop 2 (two) times a day (Patient not taking: Reported on 12/13/2022), Disp: , Rfl:     fluticasone (FLONASE) 50 mcg/act nasal spray,   (Patient not taking: Reported on 12/13/2022), Disp: , Rfl: 4    pantoprazole (PROTONIX) 20 mg tablet, Take 20 mg by mouth daily (Patient not taking: Reported on 12/13/2022), Disp: , Rfl:     Allergies   Allergen Reactions    Pollen Extract Other (See Comments)     Seasonal          Cardiac Test Results:     ECG 5/5/2023: Normal sinus rhythm. 83 bpm. Low voltage QRS. Lipid panel 7/26/2022: C 140. T 233. H 39. L 54. Lipid panel 9/11/2019: C 174. T 299. H 36. L 78. Echocardiogram 05/17/2019:  EF 60%. Grade 2 diastolic dysfunction.   Left atrial size upper limit of normal.    Review of Systems:    Review of Systems   Constitutional:  Positive for fatigue (improved). Negative for activity change and appetite change. HENT:  Negative for congestion, hearing loss, tinnitus and trouble swallowing. Eyes:  Negative for visual disturbance. Respiratory:  Positive for shortness of breath (improved). Negative for cough, chest tightness and wheezing. Cardiovascular:  Negative for chest pain, palpitations and leg swelling. Gastrointestinal:  Negative for abdominal distention, abdominal pain, nausea and vomiting. Genitourinary:  Negative for difficulty urinating. Musculoskeletal:  Negative for arthralgias. Skin:  Negative for rash. Neurological:  Negative for dizziness, syncope and light-headedness. Hematological:  Does not bruise/bleed easily. Psychiatric/Behavioral:  Negative for confusion. The patient is not nervous/anxious. All other systems reviewed and are negative. Vitals:    05/05/23 0808 05/05/23 0833   BP: 124/80 124/84   Pulse: 80    SpO2: 99%    Weight: 120 kg (264 lb 3.2 oz)    Height: 5' 7" (1.702 m)      Vitals:    05/05/23 0808   Weight: 120 kg (264 lb 3.2 oz)     Height: 5' 7" (170.2 cm)     Body mass index is 41.38 kg/m². Physical Exam  Vitals reviewed. Constitutional:       Appearance: She is well-developed. HENT:      Head: Normocephalic and atraumatic. Eyes:      Conjunctiva/sclera: Conjunctivae normal.      Pupils: Pupils are equal, round, and reactive to light. Neck:      Vascular: No JVD. Cardiovascular:      Rate and Rhythm: Normal rate and regular rhythm. Heart sounds: Normal heart sounds. No murmur heard. No friction rub. No gallop. Pulmonary:      Effort: Pulmonary effort is normal.      Breath sounds: Examination of the left-lower field reveals decreased breath sounds. Decreased breath sounds present. Abdominal:      General: Bowel sounds are normal.      Palpations: Abdomen is soft. Musculoskeletal:      Cervical back: Normal range of motion. Skin:     General: Skin is warm and dry. Neurological:      Mental Status: She is alert and oriented to person, place, and time.    Psychiatric:         Behavior: Behavior normal.

## 2023-05-08 ENCOUNTER — TELEMEDICINE (OUTPATIENT)
Dept: ANESTHESIOLOGY | Facility: CLINIC | Age: 53
End: 2023-05-08

## 2023-05-08 ENCOUNTER — HOSPITAL ENCOUNTER (OUTPATIENT)
Dept: ULTRASOUND IMAGING | Facility: HOSPITAL | Age: 53
Discharge: HOME | End: 2023-05-08
Attending: STUDENT IN AN ORGANIZED HEALTH CARE EDUCATION/TRAINING PROGRAM

## 2023-05-08 DIAGNOSIS — I10 ESSENTIAL HYPERTENSION: ICD-10-CM

## 2023-05-08 DIAGNOSIS — D68.00 VON WILLEBRAND DISEASE (HCC): ICD-10-CM

## 2023-05-08 DIAGNOSIS — C73 THYROID CANCER (HCC): ICD-10-CM

## 2023-05-08 DIAGNOSIS — E11.9 TYPE 2 DIABETES MELLITUS WITHOUT COMPLICATION, WITHOUT LONG-TERM CURRENT USE OF INSULIN (HCC): Primary | ICD-10-CM

## 2023-05-08 DIAGNOSIS — E04.1 THYROID NODULE: ICD-10-CM

## 2023-05-08 NOTE — PROGRESS NOTES
SURGICAL OPTIMIZATION CENTER   CONSULT: HIGH RISK   Tele-Virtual Regular Visit    Verification of patient location:    Patient is located at Home in the following state in which I hold an active license [place]    Assessment/Plan:  · 47year old female referred to [facility] for surgeical optimization   · Consult concerns: HX of Von Willebrand disease   · She is scheduled on  Case: [id number] Date/Time: 05/12/23 1230   Procedure: LEFT THYROIDECTOMY (Left: Neck)   Anesthesia type: General   Diagnosis:        Thyroid nodule [E04 1]       Thyroid cancer ([address] ) [C73]   Pre-op diagnosis:        Thyroid nodule [E04 1]       Thyroid cancer ([address] ) [C73]   Location: AN OR ROOM  / [facility]   Surgeons: Joe Hogue MD     Problem List Items Addressed This Visit        Endocrine    Type 2 diabetes mellitus without complication, without long-term current use of insulin (HCC) - Primary  · HBA1C 7 9- HIGHER RISK FOR POST-OP SSI   · STILL REMAINS < 8 0  · SAW ENDO AND PLAN IN PLACE   ENDO PLAN:     Type 2 diabetes - worsening, possibly on account of dietary indiscretions  No changes recommended to diabetes regimen, will focus on lifestyle interventions primarily  Encourage routine SMBG monitoring, carb consistent diet  Will plan monitoring of A1c in several months  Consider escalation of therapy at that time if no improvement of control   Call with concerns            Cardiovascular and Mediastinum    Essential hypertension  · No concerns  CONTINUE CURRENT TREATMENT   · stable       Hematopoietic and Hemostatic    Von Willebrand disease ([address] )  · Diagnosed 20 years ago   · No treatment- was just monitoring   · No active bleeding concerns, denies epistaxis, denies any bleeding issues   · Last surgery in  2015 B/L breast reduction - 2015- NO PROBLEMS  · gallbladder removal - no problems   · B/L KNEES - NO CONCERNS   · DOES NOT FOLLOW HEME doctor   · BELIEVES SHE HAD DDAVP BEFORE BREAST REDUCTION   · recommend DDVAP dose prior to surgery   · Recommend patient establish care with heme doctor for management             Reason for visit is   Chief Complaint   Patient presents with   • Virtual Regular Visit        Encounter provider She Lomas, [address]    Provider located at [address] [facility] [id number] [phone]      Recent Visits  Date Type Provider Dept   05/03/23 Telephone [facility] [address]   Showing recent visits within past 7 days and meeting all other requirements  Today's Visits  Date Type Provider Dept   05/08/23 [facility], [address] [place] [address] Surgical Optimization Center   Showing today's visits and meeting all other requirements  Future Appointments  No visits were found meeting these conditions  Showing future appointments within next 150 days and meeting all other requirements       The patient was identified by name and date of birth  Quin Sung was informed that this is a telemedicine visit and that the visit is being conducted through Telephone  My office door was closed  No one else was in the room  She acknowledged consent and understanding of privacy and security of the video platform  The patient has agreed to participate and understands they can discontinue the visit at any time  Patient is aware this is a billable service  Subjective  Quin Sung is a 47 y o  female who was referred to [facility] for presurgery optimization  Consult concerns include von Willebrand's disease  She is electing to have a left thyroidectomy in the near future  I met with patient over the telephone  She originally connected via video with [facility] to start her visit  Patient then disconnected due to long waiting times  Patient elected to complete her appointment over the telephone with myself  Patient was pleasant and a pleasure to care for  She was alert and orient x3  No concerns    The reason for her visit today was her history of von Willebrand's disease  She admits to being in well health today  No new developments  States over 20 years ago her son was having Abnormal blood work results  He was then diagnosed with von Willebrand's disease  She was then tested herself and also positive for von Willebrand's disease  Her diagnosis is over 20 years ago  She never had any treatment  She she does not see a heme doctor  She has had surgery before with no problem  She does recall in 2015 having a bilateral breast reduction and having DDAVP prior to that surgery  No concerns  Patient denies ever having any active bleeding problems  Denies epitaxis  Denies bleeding from her guMS  Denies easy bruising  Denies any vaginal or rectal bleeding  does state when she was younger she did have heavy menstrual periods but at the time, back then, they thought that was normal   She is not quite sure but believes that she did have a dose of DDAVP prior to her breast reduction  She tolerated that well  And had no problems with the surgery  Upon review of patient's case today we are recommending a dose of DD  prior to this operation  I also recommended that patient may want to establish care with a hematologist for future management of her disease          Past Medical History:   Diagnosis Date   • COVID-19    • Diverticulosis    • Dry eye    • Hypertension    • Seasonal allergies    • Thyroid nodule    • Von Willebrand disease ([facility] )        Past Surgical History:   Procedure Laterality Date   • ARTHROSCOPIC REPAIR ACL Left    • CHOLECYSTECTOMY     • HYSTERECTOMY  2010    partial hysterectomy   • IR BIOPSY THYROID     • KNEE ARTHROSCOPY Right    • REDUCTION MAMMAPLASTY Bilateral 2016   • US GUIDED THYROID BIOPSY  03/10/2023       Current Outpatient Medications   Medication Sig Dispense Refill   • amLODIPine (NORVASC) 10 mg tablet Take 1 tablet (10 mg total) by mouth daily 90 tablet 3   • atorvastatin (LIPITOR) 10 mg tablet TAKE 1 TABLET BY MOUTH EVERY DAY 90 tablet 1   • cholecalciferol (VITAMIN D3) 1,000 units tablet Take 2 tablets (2,000 Units total) by mouth daily 60 tablet 0   • lisinopril (ZESTRIL) 10 mg tablet Take 1 tablet (10 mg total) by mouth daily 90 tablet 3   • Magnesium 250 MG TABS Take 1 tablet (250 mg total) by mouth daily 90 tablet 3   • metoprolol tartrate (LOPRESSOR) 50 mg tablet Take 1 tablet (50 mg total) by mouth 2 (two) times a day 180 tablet 3   • Ozempic, 2 MG/DOSE, 8 MG/3ML SOPN INJECT 2 MG SUBCUTANEOUSLY ONE TIME PER WEEK 6 mL 3   • traMADol (Ultram) 50 mg tablet Take 1 tablet (50 mg total) by mouth every 6 (six) hours as needed for moderate pain 10 tablet 0   • hydrochlorothiazide (HYDRODIURIL) 25 mg tablet Take 1 tablet (25 mg total) by mouth daily 90 tablet 3   • pantoprazole (PROTONIX) 20 mg tablet Take 20 mg by mouth daily (Patient not taking: Reported on 12/13/2022)       No current facility-administered medications for this visit  Allergies   Allergen Reactions   • Pollen Extract Other (See Comments)     Seasonal        Review of Systems   Constitutional: Negative for chills and fever  HENT: Negative for postnasal drip and sore throat  Respiratory: Negative for cough and choking  Cardiovascular: Negative for chest pain, palpitations and leg swelling  Gastrointestinal: Negative for diarrhea, nausea, rectal pain and vomiting  Genitourinary: Negative for difficulty urinating  Skin: Negative for color change, pallor, rash and wound  Neurological: Negative for dizziness  Psychiatric/Behavioral: Negative for confusion and hallucinations  There were no vitals filed for this visit  Physical Exam  Pulmonary:      Effort: Pulmonary effort is normal    Neurological:      General: No focal deficit present  Mental Status: She is alert and oriented to person, place, and time  Mental status is at baseline     Psychiatric:         Mood and Affect: Mood normal          Behavior: Behavior normal          Thought Content: Thought content normal          Judgment: Judgment normal           Visit Time  Total Visit Duration:10 minutes       Surgical Optimization Center on: 5/8   Date of Surgery: 5/12   · Falls (last 6 months): 0 falls   · PHQ- 9 Depression Scale: 0   · Nutrition Assessment Score: 10   · METS: 9 25   Health goals:  -What are your overall health goals? No goals   -What brings you strength? Family, friends   -What activities are important to you? Gardening       Patient & I talked about B E S T  Surge      Breathing exercises :   Patient was encouraged to begin lung exercises today  Eating/nutrition :   Encouraged patient to increase oral protein intake prior to surgery  This can be accomplished by consuming chicken, fish, tuna fish, cottage cheese, cheese, eggs, [place] yogurt, and protein shakes as needed  Sleep/Stress management :   Patient was encouraged to rest their body prior to surgery  Encouraged attempting to get 8 hours of sleep at night  Training exercises  Patient was encouraged to remain active as possible  Incentive spirometer teaching completed, instructed to do 30  breathes 1 x a day, IS sent home with patient   Surgical soap was given  B E S T   Shirt was given

## 2023-05-09 ENCOUNTER — TELEPHONE (OUTPATIENT)
Dept: SURGICAL ONCOLOGY | Facility: CLINIC | Age: 53
End: 2023-05-09

## 2023-05-10 ENCOUNTER — TELEPHONE (OUTPATIENT)
Dept: HEMATOLOGY ONCOLOGY | Facility: CLINIC | Age: 53
End: 2023-05-10

## 2023-05-10 ENCOUNTER — TELEPHONE (OUTPATIENT)
Dept: SURGICAL ONCOLOGY | Facility: CLINIC | Age: 53
End: 2023-05-10

## 2023-05-10 DIAGNOSIS — R59.9 LYMPH NODE ENLARGEMENT: Primary | ICD-10-CM

## 2023-05-10 DIAGNOSIS — D68.00 VON WILLEBRAND DISEASE (HCC): Primary | ICD-10-CM

## 2023-05-10 NOTE — TELEPHONE ENCOUNTER
Spoke to patient about US guided Lymph node biopsy appt  She said to schedule first available and anytime and anyday-      Called central scheduling -Marleny-central scheduling  first call had several questions about patient- answered - Diana Jauregui nurse said order stated 1 week 6/9/23 scheduled - advised Diana and she would like sooner      2nd call central scheduling 15 min waittime no answer- try after am into pm    3rd call 15 min wait for central scheduling Silvia Foster advised they will not change 6/9/23 appt even if it says 1 week on order- Diana stated it could be changed to stat and was changed to stat and I was told it will not be changed that is the only date for this patient at this time

## 2023-05-10 NOTE — TELEPHONE ENCOUNTER
I called and left a message for patient to call back so we can get her a new surgery date before our OR time fills up  Dr Jazlyn Melendez would like for her current surgery on 5/12/23 to be rescheduled due to needing a biopsy before surgery  Patient can be transferred to Rohit Perrin when calls back

## 2023-05-10 NOTE — TELEPHONE ENCOUNTER
Call Transfer   Who are you speaking with? Patient   If it is not the patient, are they listed on an active communication consent form? N/A   Who is the patients HemOnc/SurgOnc provider? Other   What is the reason for this call? Pt was returning call for Donna Kamara  Person/Department that the call was transferred to? Time that call was transferred? Donna Kamara 4:19pm   Your call will be transferred now  If you receive a voicemail, please leave a detailed message and a member of the team will return your call as soon as possible  Did you relay this information to the caller?   Yes

## 2023-05-10 NOTE — TELEPHONE ENCOUNTER
I called Chary to schedule a new patient consultation with [facility] in response to a referral sent to our department  A consultation was scheduled for patient during this call  Patient is scheduled on 5/31/23  at 11:20am with Dr Fran Oliver at the [facility] Patient only wanted to be seen in [facility] for consult

## 2023-05-10 NOTE — TELEPHONE ENCOUNTER
Left messages on all 3 contact numbers for patient- Wanted to let patient know that an earlier date may be available  for her US LN BX- She needs to call the hopeline x 4 so Hyacinth MERCADO can be informed of patients desire of when she can get her BX      5/26 may be available- Hyacinth MERCADO has to be discussed with patients choice of date for BX so it can be set up by Stanton Mak at that particular date    Incoming call- Patient stated she will come in 5/26, she just needs time and location and she will be there on 5/26      [facility] with date/time and location

## 2023-05-10 NOTE — TELEPHONE ENCOUNTER
Call Transfer   Who are you speaking with? Patient   If it is not the patient, are they listed on an active communication consent form? N/A   Who is the patients HemOnc/SurgOnc provider? Other   What is the reason for this call? Returning call for Vasu Woodward   Person/Department that the call was transferred to? Time that call was transferred? Cesilia Pascual 4:19am   Your call will be transferred now  If you receive a voicemail, please leave a detailed message and a member of the team will return your call as soon as possible  Did you relay this information to the caller?   Yes

## 2023-05-10 NOTE — TELEPHONE ENCOUNTER
Called to discuss equivocal appearing LN on thyroid US  Will need to delay Friday's surgery and she will need FNA of node  Will be called to schedule this  All questions answered

## 2023-05-11 ENCOUNTER — TELEPHONE (OUTPATIENT)
Dept: SURGICAL ONCOLOGY | Facility: CLINIC | Age: 53
End: 2023-05-11

## 2023-05-11 NOTE — TELEPHONE ENCOUNTER
I called and left another message for patient to please give us a call back to discuss a new surgery date with Dr Ruffin Pallas so I can get her rescheduled  Patient can be transferred to me when calls back

## 2023-05-16 ENCOUNTER — TELEPHONE (OUTPATIENT)
Dept: HEMATOLOGY ONCOLOGY | Facility: CLINIC | Age: 53
End: 2023-05-16

## 2023-05-16 ENCOUNTER — TELEPHONE (OUTPATIENT)
Dept: SURGICAL ONCOLOGY | Facility: CLINIC | Age: 53
End: 2023-05-16

## 2023-05-16 NOTE — TELEPHONE ENCOUNTER
Patient Call    Who are you speaking with? mom   If it is not the patient, are they listed on an active communication consent form? yes   What is the reason for this call? Michelle Hartman calling   Does this require a call back? yes   If a call back is required, please list best call back number [phone]   If a call back is required, advise that a message will be forwarded to their care team and someone will return their call as soon as possible  Did you relay this information to the patient?  yes

## 2023-05-16 NOTE — TELEPHONE ENCOUNTER
I called and spoke with patients Mother who stated patient works nights and is sleeping right now until later this afternoon  I explained I have been trying to get a hold of Joss Bull in regards to setting up a new surgery date  [facility] know I am currently holding surgery dates of 6/9 in [facility] and 6/22 in [facility] and would need to know by tomorrow which day the patient would like  [facility] know if I don't hear back by tomorrow afternoon, I will call them to check in  Patients Mother appreciated the call  Patients mom returned my call stating she was able to check with Joss Bull in regards to June 9th and June 22nd June 9th will not work due to work and staffing  June 22nd may be a possibility but will have to confirm with her manager  Patients mom asked what the next date would be if June 22nd wouldn't work  I let her know our next available would be June 27th in [facility]  I let her know I will hold June 22nd and June 27th for now until I hear back from them  Patients mom appreciated this

## 2023-05-17 ENCOUNTER — PREP FOR PROCEDURE (OUTPATIENT)
Dept: SURGICAL ONCOLOGY | Facility: CLINIC | Age: 53
End: 2023-05-17

## 2023-05-17 DIAGNOSIS — C73 THYROID CANCER (HCC): Primary | ICD-10-CM

## 2023-05-23 ENCOUNTER — TELEPHONE (OUTPATIENT)
Dept: RADIOLOGY | Facility: HOSPITAL | Age: 53
End: 2023-05-23

## 2023-05-23 NOTE — NURSING NOTE
RN received telephone call back from pt to discuss upcoming appointment at [facility] Radiology Department and consultation completed  Pt is having a L Lymph Node Biopsy completed on 5/23/2023  Allergies reviewed and verified pt does not currently take any anticoagulant medications  Pre procedure instructions including diet and taking own medications discussed with pt  Pt instructed that she may eat normally and take medications as usual before the procedure  Pt verbalized understanding of instructions given  Reminded pt of the location, date and time of procedure  My number was given to call if any questions or concerns arise pre or post procedure

## 2023-05-26 ENCOUNTER — HOSPITAL ENCOUNTER (OUTPATIENT)
Dept: ULTRASOUND IMAGING | Facility: HOSPITAL | Age: 53
Discharge: HOME | End: 2023-05-26
Attending: STUDENT IN AN ORGANIZED HEALTH CARE EDUCATION/TRAINING PROGRAM

## 2023-05-26 DIAGNOSIS — R59.9 LYMPH NODE ENLARGEMENT: ICD-10-CM

## 2023-05-26 RX ORDER — LIDOCAINE HYDROCHLORIDE 10 MG/ML
5 INJECTION, SOLUTION EPIDURAL; INFILTRATION; INTRACAUDAL; PERINEURAL ONCE
Status: COMPLETED | OUTPATIENT
Start: 2023-05-26 | End: 2023-05-26

## 2023-05-26 RX ADMIN — LIDOCAINE HYDROCHLORIDE 5 ML: 10 INJECTION, SOLUTION EPIDURAL; INFILTRATION; INTRACAUDAL; PERINEURAL at 08:38

## 2023-05-31 ENCOUNTER — CONSULT (OUTPATIENT)
Dept: HEMATOLOGY ONCOLOGY | Facility: CLINIC | Age: 53
End: 2023-05-31

## 2023-05-31 ENCOUNTER — LAB (OUTPATIENT)
Dept: LAB | Facility: CLINIC | Age: 53
End: 2023-05-31
Payer: COMMERCIAL

## 2023-05-31 VITALS
SYSTOLIC BLOOD PRESSURE: 132 MMHG | BODY MASS INDEX: 40.97 KG/M2 | DIASTOLIC BLOOD PRESSURE: 92 MMHG | HEART RATE: 94 BPM | TEMPERATURE: 97.5 F | HEIGHT: 67 IN | OXYGEN SATURATION: 96 % | WEIGHT: 261 LBS

## 2023-05-31 DIAGNOSIS — Z14.8 HEMOCHROMATOSIS CARRIER: ICD-10-CM

## 2023-05-31 DIAGNOSIS — D68.00 VON WILLEBRAND DISEASE (HCC): ICD-10-CM

## 2023-05-31 DIAGNOSIS — E04.1 THYROID NODULE: ICD-10-CM

## 2023-05-31 DIAGNOSIS — Z14.8 HEMOCHROMATOSIS CARRIER: Primary | ICD-10-CM

## 2023-05-31 LAB
T4 FREE SERPL-MCNC: 1.02 NG/DL (ref 0.61–1.12)
TSH SERPL DL<=0.05 MIU/L-ACNC: 0.97 UIU/ML (ref 0.45–4.5)

## 2023-05-31 PROCEDURE — 36415 COLL VENOUS BLD VENIPUNCTURE: CPT

## 2023-05-31 PROCEDURE — 85240 CLOT FACTOR VIII AHG 1 STAGE: CPT

## 2023-05-31 PROCEDURE — 85246 CLOT FACTOR VIII VW ANTIGEN: CPT

## 2023-05-31 PROCEDURE — 84439 ASSAY OF FREE THYROXINE: CPT

## 2023-05-31 PROCEDURE — 84443 ASSAY THYROID STIM HORMONE: CPT

## 2023-05-31 PROCEDURE — 85245 CLOT FACTOR VIII VW RISTOCTN: CPT

## 2023-05-31 NOTE — PROGRESS NOTES
Oncology Consult Note  Eligio Burgos 47 y o  female MRN: [id number]  Unit/Bed#:  Encounter: [id number]      Presenting Complaint:    [place] disease, her mother diagnosed with hemochromatosis, the patient scheduled for partial thyroidectomy    History of Presenting Illness: Eligio Burgos is seen for initial consultation 5/31/2023 at the referral of Gayle Reaves MD   56-year-old  female with history of partial hysterectomy secondary to menorrhagia, von Willebrand disease diagnosed many years ago, diabetes mellitus type 2, meniscus repair, epistaxis, hypertension, diverticulosis, cholecystectomy, seasonal allergy, 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 and possible for thyroidectomy  She required DDAVP for surgeries in the past  Her mother diagnosed with hemochromatosis    She does not smoke or drink    She does not take any hormonal therapy      Review of Systems - As stated in the HPI otherwise the fourteen point review of systems was negative      Past Medical History:   Diagnosis Date   • COVID-19    • Diverticulosis    • Dry eye    • Hypertension    • Seasonal allergies    • Thyroid nodule    • Von Willebrand disease ([facility] [address] )        Social History     Socioeconomic History   • Marital status: Single     Spouse name: Not on file   • Number of children: Not on file   • Years of education: Not on file   • Highest education level: Not on file   Occupational History   • Not on file   Tobacco Use   • Smoking status: Never   • Smokeless tobacco: Never   Vaping Use   • Vaping Use: Never used   Substance and Sexual Activity   • Alcohol use: Not Currently   • Drug use: Never   • Sexual activity: Not on file     Comment: Defer   Other Topics Concern   • Not on file   Social History Narrative   • Not on file     Social Determinants of Health     Financial Resource Strain: Not on file   Food Insecurity: Not on file   Transportation Needs: Not on file   Physical Activity: Not on file   Stress: Not on file   Social Connections: Not on file   Intimate Partner Violence: Not on file   Housing Stability: Not on file       Family History   Problem Relation Age of Onset   • Hypertension Mother    • Hyperlipidemia Mother    • Diabetes type II Mother    • Breast cancer Mother    • Thyroid cancer Mother    • Hypertension Father    • Heart attack Father    • Hyperlipidemia Father    • Lung cancer Father    • Hypertension Maternal Grandmother    • Cancer Maternal Grandmother    • No Known Problems Maternal Grandfather    • No Known Problems Paternal Grandmother    • No Known Problems Paternal Grandfather    • Skin cancer Maternal Aunt    • No Known Problems Paternal Aunt        Allergies   Allergen Reactions   • Pollen Extract Other (See Comments)     Seasonal          Current Outpatient Medications:   •  amLODIPine (NORVASC) 10 mg tablet, Take 1 tablet (10 mg total) by mouth daily, Disp: 90 tablet, Rfl: 3  •  atorvastatin (LIPITOR) 10 mg tablet, TAKE 1 TABLET BY MOUTH EVERY DAY, Disp: 90 tablet, Rfl: 1  •  cholecalciferol (VITAMIN D3) 1,000 units tablet, Take 2 tablets (2,000 Units total) by mouth daily, Disp: 60 tablet, Rfl: 0  •  hydrochlorothiazide (HYDRODIURIL) 25 mg tablet, Take 1 tablet (25 mg total) by mouth daily, Disp: 90 tablet, Rfl: 3  •  lisinopril (ZESTRIL) 10 mg tablet, Take 1 tablet (10 mg total) by mouth daily, Disp: 90 tablet, Rfl: 3  •  Magnesium 250 MG TABS, Take 1 tablet (250 mg total) by mouth daily, Disp: 90 tablet, Rfl: 3  •  metoprolol tartrate (LOPRESSOR) 50 mg tablet, Take 1 tablet (50 mg total) by mouth 2 (two) times a day, Disp: 180 tablet, Rfl: 3  •  Ozempic, 2 MG/DOSE, 8 MG/3ML SOPN, INJECT 2 MG SUBCUTANEOUSLY ONE TIME PER WEEK, Disp: 6 mL, Rfl: 3  •  pantoprazole (PROTONIX) 20 mg tablet, Take 20 mg by mouth if needed (Patient not taking: Reported on 5/31/2023), Disp: , Rfl:   •  traMADol (Ultram) 50 mg tablet, Take 1 tablet (50 mg total) by mouth every 6 (six) hours as needed for "moderate pain (Patient not taking: Reported on 5/31/2023), Disp: 10 tablet, Rfl: 0      /92 (BP Location: Left arm, Patient Position: Sitting, Cuff Size: Large)   Pulse 94   Temp 97 5 °F (36 4 °C) (Temporal)   Ht 5' 7\" (1 702 m)   Wt 118 kg (261 lb)   SpO2 96%   BMI 40 88 kg/m²       General Appearance:    Alert, oriented, obese        Eyes:    PERRL   Ears:    Normal external ear canals, both ears   Nose:   Nares normal, septum midline   Throat:   Mucosa moist  Pharynx without injection  Neck:   Supple       Lungs:     Clear to auscultation bilaterally   Chest Wall:    No tenderness or deformity    Heart:    Regular rate and rhythm       Abdomen:     Soft, non-tender, bowel sounds +, no organomegaly           Extremities:   Extremities no cyanosis or edema       Skin:   no rash or icterus  Lymph nodes:   Cervical, supraclavicular, and axillary nodes normal   Neurologic:   CNII-XII intact, normal strength, sensation and reflexes     Throughout               No results found for this or any previous visit (from the past 48 hour(s))  US guided lymph node biopsy left    Result Date: 5/30/2023  Narrative: ULTRASOUND-GUIDED LEFT NECK LYMPH NODE BIOPSY HISTORY: 47year-old with a history of thyroid nodule suspicious for malignancy and an abnormal left neck lymph node  The patient presents with a prescription for ultrasound-guided fine-needle aspiration biopsy of the left neck zone 4 lymph node with samples to be sent for cytology and thyroglobulin testing  COMPARISON: Neck ultrasound dated 5/8/2023 was reviewed  An abnormal left zone 4 lymph node measuring 1 1 x 0 6 x 0 6 cm was identified  FINDINGS: On ultrasound imaging performed today, the corresponding left neck zone 4 lymph node measures 1 1 x 0 5 x 0 6 cm  PROCEDURE: The procedure was explained to the patient including risks of hemorrhage, infection, allergic reaction, and local injury   The possibility of a nondiagnostic biopsy result and the " "need for repeat biopsy or sonographic follow-up was explained to  the patient  Informed consent was freely obtained  The patient verbalized expressed understanding of the above risks and wished to proceed with the procedure  A blood sample was obtained for thyroglobulin testing immediately prior to the procedure  Final standard \"time-out\" procedure was performed  The neck was prepped and draped in normal sterile fashion  Under real-time ultrasound guidance and local anesthesia seven passes with 25 gauge needles were made through the abnormal left neck zone 4 lymph node  Cytopathology was present and deemed the specimens adequate for evaluation  Two of the samples obtained were reserved for potential flow cytometry testing  Three of the samples obtained were reserved for thyroglobulin testing  The patient tolerated the procedure well  There were no complications  Post-procedure instructions were provided for the patient  The patient was asked to call us with any questions, concerns, or acute problems  The patient expressed understanding of the  above  Impression: Status post successful ultrasound-guided lymph node biopsy  FNA samples were obtained for flow cytometry and thyroglobulin testing  Final pathology results are pending  The above findings and procedure were reviewed with Dr Benigno Khanna  Procedure was performed by Solomon Solano PA-C under the direct supervision of Dr Benigno Khanan  Workstation performed: [id number]     US head neck lymph node mapping    Result Date: 5/8/2023  Narrative: NECK ULTRASOUND INDICATION:     E04 1: Nontoxic single thyroid nodule C73: Malignant neoplasm of thyroid gland  COMPARISON: Thyroid ultrasound dated 1/31/2023  FINDINGS: Ultrasound of the cervical lymph node chains was performed with a high frequency linear transducer  Bilateral thyroid nodules are not significantly changed  There is a 1 1 x 0 6 x 0 6 cm left zone 4 lymph node without definite fatty hilum   However the " lymph node maintains short axis dimensions of less than 0 7 cm  Other lymph nodes maintain unremarkable morphologic contour, echogenicity and short axis dimensions of less than 0 7 cm  No evidence for microcalcification or focal nodularity  Impression: 1 1 x 0 6 x 0 6 cm left zone 4 lymph node without definite fatty hilum and therefore is indeterminate  Other lymph nodes are unremarkable  Workstation performed: [id number]     XR chest pa & lateral    Result Date: 5/4/2023  Narrative: CHEST INDICATION:   E04 1: Nontoxic single thyroid nodule  Preop  COMPARISON: CXR 12/29/2020 and chest CT 12/18/2021  EXAM PERFORMED/VIEWS:  XR CHEST PA & LATERAL  DUAL ENERGY SUBTRACTION  FINDINGS: Cardiomediastinal silhouette normal  Lungs clear  Retrosternal nodule on the lateral projection is due to benign calcified costal cartilage  No effusion or pneumothorax  Benign calcified granuloma in the left midlung  Upper abdomen normal  Cholecystectomy  Bones normal for age  Impression: No acute cardiopulmonary disease  Workstation performed: [id number]     ECOG :0      Assessment and plan:  #1  History of von Willebrand requiring DDAVP for some of the elective surgeries in the past, the patient is scheduled for Lance thyroidectomy and possible lymph node dissection, she is not cleared for surgery until we will have von Willebrand profile and determine the dose for DDAVP prior to surgery  2    Her mother diagnosed with hemochromatosis, the patient is at least a carrier, ferritin level in the range of 500, will check hemochromatosis gene profile

## 2023-06-01 ENCOUNTER — TELEPHONE (OUTPATIENT)
Dept: SURGICAL ONCOLOGY | Facility: CLINIC | Age: 53
End: 2023-06-01

## 2023-06-01 LAB — SCAN RESULT: NORMAL

## 2023-06-01 NOTE — TELEPHONE ENCOUNTER
Tc and left detailed message about biopsy results  Explained surgical plan has not changed and Dr Gurpreet Min will proceed with a left hemithyroidectomy on 6/22 at [facility]  Instructed to call with any questions or concerns  (2) good, crying Patient/EMS

## 2023-06-02 LAB
FACT XIIIA PPP-ACNC: 62 % (ref 56–140)
VWF AG ACT/NOR PPP IA: 82 % (ref 50–200)
VWF:RCO ACT/NOR PPP PL AGG: 52 % (ref 50–200)

## 2023-06-12 LAB
SCAN RESULT: NORMAL
SCAN RESULT: NORMAL

## 2023-06-14 ENCOUNTER — ANESTHESIA EVENT (OUTPATIENT)
Dept: PERIOP | Facility: HOSPITAL | Age: 53
End: 2023-06-14
Payer: COMMERCIAL

## 2023-06-14 ENCOUNTER — TELEMEDICINE (OUTPATIENT)
Dept: HEMATOLOGY ONCOLOGY | Facility: CLINIC | Age: 53
End: 2023-06-14
Payer: COMMERCIAL

## 2023-06-14 DIAGNOSIS — D68.00 VON WILLEBRAND DISEASE (HCC): Primary | ICD-10-CM

## 2023-06-14 PROCEDURE — 99212 OFFICE O/P EST SF 10 MIN: CPT | Performed by: INTERNAL MEDICINE

## 2023-06-14 NOTE — PROGRESS NOTES
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 and possible for thyroidectomy  She required DDAVP for surgeries in the past  Her mother diagnosed with hemochromatosis     She does not smoke or drink     She does not take any hormonal therapy    1 June 2023 von Willebrand factor of 52% (), factor VIII activity of 62%, von Willebrand antigen of 82%  Verification of patient location:    Patient is located at Home in the following state in which I hold an active license [place]      Assessment/Plan:  1 (disease requiring DDAVP for some of the elective surgeries in the past, scheduled for thyroidectomy/hemithyroidectomy with possible lymph node dissection    Current von Willebrand activity of 52%    Patient needs DDAVP 20 mcg IV infusion over 30 minutes 1 hour prior to her surgery    Her mother diagnosed with hemochromatosis, the insurance is denying hemochromatosis gene profile the ferritin level in the range of 500  Problem List Items Addressed This Visit        Hematopoietic and Hemostatic    Von Willebrand disease ([facility] [address] ) - Primary            Reason for visit is   Chief Complaint   Patient presents with   • Virtual Regular Visit        Encounter provider Rafael Osman MD    Provider located at [address] [address] [phone]      Recent Visits  No visits were found meeting these conditions    Showing recent visits within past 7 days and meeting all other requirements  Today's Visits  Date Type Provider Dept   06/14/23 Telemedicine Rafael Osman MD [facility]   Showing today's visits and meeting all other requirements  Future Appointments  No visits were found meeting these conditions  Showing future appointments within next 150 days and meeting all other requirements       The patient was identified by name and date of birth  Gilbert Cisneros was informed that this is a telemedicine visit and that the visit is being conducted through the [employer]  She agrees to proceed     My office door was closed  No one else was in the room  She acknowledged consent and understanding of privacy and security of the video platform  The patient has agreed to participate and understands they can discontinue the visit at any time  Patient is aware this is a billable service  Subjective  Gilbert Cisneros is a 47 y o  female with von Willebrand disease        HPI     Past Medical History:   Diagnosis Date   • COVID-19    • Diverticulosis    • Dry eye    • Hypertension    • Seasonal allergies    • Thyroid nodule    • Von Willebrand disease ([facility] [address] )        Past Surgical History:   Procedure Laterality Date   • ARTHROSCOPIC REPAIR ACL Left    • CHOLECYSTECTOMY     • HYSTERECTOMY  2010    partial hysterectomy   • IR BIOPSY THYROID     • KNEE ARTHROSCOPY Right    • LIPOMA RESECTION      back   • REDUCTION MAMMAPLASTY Bilateral 2016   • TONSILLECTOMY     • US GUIDED LYMPH NODE BIOPSY LEFT  5/26/2023   • US GUIDED THYROID BIOPSY  03/10/2023       Current Outpatient Medications   Medication Sig Dispense Refill   • amLODIPine (NORVASC) 10 mg tablet Take 1 tablet (10 mg total) by mouth daily 90 tablet 3   • atorvastatin (LIPITOR) 10 mg tablet TAKE 1 TABLET BY MOUTH EVERY DAY 90 tablet 1   • cholecalciferol (VITAMIN D3) 1,000 units tablet Take 2 tablets (2,000 Units total) by mouth daily 60 tablet 0   • hydrochlorothiazide (HYDRODIURIL) 25 mg tablet Take 1 tablet (25 mg total) by mouth daily 90 tablet 3   • lisinopril (ZESTRIL) 10 mg tablet Take 1 tablet (10 mg total) by mouth daily 90 tablet 3   • Magnesium 250 MG TABS Take 1 tablet (250 mg total) by mouth daily 90 tablet 3   • metoprolol tartrate (LOPRESSOR) 50 mg tablet Take 1 tablet (50 mg total) by mouth 2 (two) times a day 180 tablet 3   • Ozempic, 2 MG/DOSE, 8 MG/3ML SOPN INJECT 2 MG SUBCUTANEOUSLY ONE TIME PER WEEK 6 mL 3     No current facility-administered medications for this visit  Allergies   Allergen Reactions   • Pollen Extract Other (See Comments)     Seasonal        Review of Systems  No easy bruisability, epistaxis, gingival bleeding  Video Exam    There were no vitals filed for this visit      Physical Exam   She is alert oriented x3

## 2023-06-14 NOTE — LETTER
June 14, 2023     [address]ie [address]  [address] [address]    Patient: Jennie Casanova   YOB: 1970   Date of Visit: 6/14/2023       Dear Dr Brian Carrasco: Thank you for referring Jennie Casanova to me for evaluation  Below are my notes for this consultation  If you have questions, please do not hesitate to call me  I look forward to following your patient along with you           Sincerely,        Chaparrita Torres MD        CC: No Recipients    Chaparrita Torres MD  6/14/2023  9:23 AM  Sign when Signing Visit    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 and possible for thyroidectomy  She required DDAVP for surgeries in the past  Her mother diagnosed with hemochromatosis     She does not smoke or drink     She does not take any hormonal therapy    1 June 2023 von Willebrand factor of 52% (), factor VIII activity of 62%, von Willebrand antigen of 82%  Verification of patient location:    Patient is located at Home in the following state in which I hold an active license [place]      Assessment/Plan:  1 (disease requiring DDAVP for some of the elective surgeries in the past, scheduled for thyroidectomy/hemithyroidectomy with possible lymph node dissection    Current von Willebrand activity of 52%    Patient needs DDAVP 20 mcg IV infusion over 30 minutes 1 hour prior to her surgery    Her mother diagnosed with hemochromatosis, the insurance is denying hemochromatosis gene profile the ferritin level in the range of 500  Problem List Items Addressed This Visit          Hematopoietic and Hemostatic    Nate Glass disease [facility]) - Primary            Reason for visit is   Chief Complaint   Patient presents with   • Virtual Regular Visit        Encounter provider Naty Conrad MD    Provider located at [address] [facility] [id number] [phone]      Recent Visits  No visits were found meeting these conditions  Showing recent visits within past 7 days and meeting all other requirements  Today's Visits  Date Type Provider Dept   06/14/23 Telemedicine Wanda Sumner  [address] today's visits and meeting all other requirements  Future Appointments  No visits were found meeting these conditions  Showing future appointments within next 150 days and meeting all other requirements       The patient was identified by name and date of birth  Marvin Escobar was informed that this is a telemedicine visit and that the visit is being conducted through the [employer]  She agrees to proceed     My office door was closed  No one else was in the room  She acknowledged consent and understanding of privacy and security of the video platform  The patient has agreed to participate and understands they can discontinue the visit at any time  Patient is aware this is a billable service  Subjective  Marvin Escobar is a 47 y o  female with von Willebrand disease        HPI     Past Medical History:   Diagnosis Date   • COVID-19    • Diverticulosis    • Dry eye    • Hypertension    • Seasonal allergies    • Thyroid nodule    • Von Willebrand disease ([facility] [address] )        Past Surgical History:   Procedure Laterality Date   • ARTHROSCOPIC REPAIR ACL Left    • CHOLECYSTECTOMY     • HYSTERECTOMY  2010    partial hysterectomy   • IR BIOPSY THYROID     • KNEE ARTHROSCOPY Right    • LIPOMA RESECTION      back   • REDUCTION MAMMAPLASTY Bilateral 2016   • TONSILLECTOMY     • US GUIDED LYMPH NODE BIOPSY LEFT  5/26/2023   • US GUIDED THYROID BIOPSY  03/10/2023       Current Outpatient Medications   Medication Sig Dispense Refill   • amLODIPine (NORVASC) 10 mg tablet Take 1 tablet (10 mg total) by mouth daily 90 tablet 3   • atorvastatin (LIPITOR) 10 mg tablet TAKE 1 TABLET BY MOUTH EVERY DAY 90 tablet 1   • cholecalciferol (VITAMIN D3) 1,000 units tablet Take 2 tablets (2,000 Units total) by mouth daily 60 tablet 0   • hydrochlorothiazide (HYDRODIURIL) 25 mg tablet Take 1 tablet (25 mg total) by mouth daily 90 tablet 3   • lisinopril (ZESTRIL) 10 mg tablet Take 1 tablet (10 mg total) by mouth daily 90 tablet 3   • Magnesium 250 MG TABS Take 1 tablet (250 mg total) by mouth daily 90 tablet 3   • metoprolol tartrate (LOPRESSOR) 50 mg tablet Take 1 tablet (50 mg total) by mouth 2 (two) times a day 180 tablet 3   • Ozempic, 2 MG/DOSE, 8 MG/3ML SOPN INJECT 2 MG SUBCUTANEOUSLY ONE TIME PER WEEK 6 mL 3     No current facility-administered medications for this visit  Allergies   Allergen Reactions   • Pollen Extract Other (See Comments)     Seasonal        Review of Systems  No easy bruisability, epistaxis, gingival bleeding  Video Exam    There were no vitals filed for this visit      Physical Exam   She is alert oriented x3

## 2023-06-14 NOTE — LETTER
June 14, 2023     Allie Tobias [address]  [address] [address]    Patient: Jennie Casanova   YOB: 1970   Date of Visit: 6/14/2023       Dear Dr Brian Carrasco: Thank you for referring Jennie Casanova to me for evaluation  Below are my notes for this consultation  If you have questions, please do not hesitate to call me  I look forward to following your patient along with you           Sincerely,        Chaparrita Torres MD        CC: No Recipients    Chaparrita Torres MD  6/14/2023  9:20 AM  Incomplete    Virtual Regular Visit  Clearance for thyroidectomy in a patient who had a history of von Willebrand disease    43-year-old  female with history of partial hysterectomy secondary to menorrhagia, von Willebrand disease diagnosed many years ago, diabetes mellitus type 2, meniscus repair, epistaxis, hypertension, diverticulosis, cholecystectomy, seasonal allergy, 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 and possible for thyroidectomy  She required DDAVP for surgeries in the past  Her mother diagnosed with hemochromatosis     She does not smoke or drink     She does not take any hormonal therapy    1 June 2023 von Willebrand factor of 52% (), factor VIII activity of 62%, von Willebrand antigen of 82%  Verification of patient location:    Patient is located at Home in the following state in which I hold an active license [place]      Assessment/Plan:  1 (disease requiring DDAVP for some of the elective surgeries in the past, scheduled for thyroidectomy/hemithyroidectomy with possible lymph node dissection    Current von Willebrand activity of 52%    Patient needs DDAVP 20 mcg IV infusion over 30 minutes 1 hour prior to her surgery    Her mother diagnosed with hemochromatosis, the insurance is denying hemochromatosis gene profile the ferritin level in the range of 500  Problem List Items Addressed This Visit          Hematopoietic and Hemostatic    Von Willebrand disease ([facility] [address] ) - Primary            Reason for visit is   Chief Complaint   Patient presents with   • Virtual Regular Visit        Encounter provider Gustavo Zhao MD    Provider located at [address] [address] [phone]      Recent Visits  No visits were found meeting these conditions  Showing recent visits within past 7 days and meeting all other requirements  Today's Visits  Date Type Provider Dept   06/14/23 Telemedicine Wanda Martinez  [facility] [address] today's visits and meeting all other requirements  Future Appointments  No visits were found meeting these conditions  Showing future appointments within next 150 days and meeting all other requirements       The patient was identified by name and date of birth  Jarred Mckinney was informed that this is a telemedicine visit and that the visit is being conducted through the [employer]  She agrees to proceed     My office door was closed  No one else was in the room  She acknowledged consent and understanding of privacy and security of the video platform  The patient has agreed to participate and understands they can discontinue the visit at any time  Patient is aware this is a billable service  Subjective  Jarred Mckinney is a 47 y o  female with von Willebrand disease        HPI     Past Medical History:   Diagnosis Date   • COVID-19    • Diverticulosis    • Dry eye    • Hypertension    • Seasonal allergies    • Thyroid nodule    • Von Willebrand disease ([address] )        Past Surgical History:   Procedure Laterality Date   • ARTHROSCOPIC REPAIR ACL Left    • CHOLECYSTECTOMY     • HYSTERECTOMY  2010    partial hysterectomy   • IR BIOPSY THYROID     • KNEE ARTHROSCOPY Right    • LIPOMA RESECTION      back   • REDUCTION MAMMAPLASTY Bilateral 2016   • TONSILLECTOMY     • US GUIDED LYMPH NODE BIOPSY LEFT  5/26/2023   • US GUIDED THYROID BIOPSY  03/10/2023 Current Outpatient Medications   Medication Sig Dispense Refill   • amLODIPine (NORVASC) 10 mg tablet Take 1 tablet (10 mg total) by mouth daily 90 tablet 3   • atorvastatin (LIPITOR) 10 mg tablet TAKE 1 TABLET BY MOUTH EVERY DAY 90 tablet 1   • cholecalciferol (VITAMIN D3) 1,000 units tablet Take 2 tablets (2,000 Units total) by mouth daily 60 tablet 0   • hydrochlorothiazide (HYDRODIURIL) 25 mg tablet Take 1 tablet (25 mg total) by mouth daily 90 tablet 3   • lisinopril (ZESTRIL) 10 mg tablet Take 1 tablet (10 mg total) by mouth daily 90 tablet 3   • Magnesium 250 MG TABS Take 1 tablet (250 mg total) by mouth daily 90 tablet 3   • metoprolol tartrate (LOPRESSOR) 50 mg tablet Take 1 tablet (50 mg total) by mouth 2 (two) times a day 180 tablet 3   • Ozempic, 2 MG/DOSE, 8 MG/3ML SOPN INJECT 2 MG SUBCUTANEOUSLY ONE TIME PER WEEK 6 mL 3     No current facility-administered medications for this visit  Allergies   Allergen Reactions   • Pollen Extract Other (See Comments)     Seasonal        Review of Systems  No easy bruisability, epistaxis, gingival bleeding  Video Exam    There were no vitals filed for this visit      Physical Exam   She is alert oriented x3          Viviana Centeno MD  6/14/2023  9:16 AM  Sign when Signing Visit    Virtual Regular Visit    Verification of patient location:    Patient is located at {[facility] Virtual Patient Location:[address]} in the following state in which I hold an active license {[facility] virtual patient location:[address]}      Assessment/Plan:    Problem List Items Addressed This Visit          Hematopoietic and Hemostatic    Von Willebrand disease ([facility] ) - Primary            Reason for visit is   Chief Complaint   Patient presents with   • Virtual Regular Visit        Encounter provider Viviana Centeno MD    Provider located at [facility] [address] [phone]      Recent Visits  No visits were found meeting these conditions  Showing recent visits within past 7 days and meeting all other requirements  Today's Visits  Date Type Provider Dept   06/14/23 Telemedicine Wanda Tabor 90 today's visits and meeting all other requirements  Future Appointments  No visits were found meeting these conditions  Showing future appointments within next 150 days and meeting all other requirements       The patient was identified by name and date of birth  Cesario Ojeda was informed that this is a telemedicine visit and that the visit is being conducted through {AMB VIRTUAL VISIT RSUEDK:38475}  {Telemedicine confidentiality :11981} {Telemedicine participants:65750}  She acknowledged consent and understanding of privacy and security of the video platform  The patient has agreed to participate and understands they can discontinue the visit at any time  Patient is aware this is a billable service  Subjective  Cesario Ojeda is a 47 y o  female ***         HPI     Past Medical History:   Diagnosis Date   • COVID-19    • Diverticulosis    • Dry eye    • Hypertension    • Seasonal allergies    • Thyroid nodule    • Von Willebrand disease ([facility] [address] )        Past Surgical History:   Procedure Laterality Date   • ARTHROSCOPIC REPAIR ACL Left    • CHOLECYSTECTOMY     • HYSTERECTOMY  2010    partial hysterectomy   • IR BIOPSY THYROID     • KNEE ARTHROSCOPY Right    • LIPOMA RESECTION      back   • REDUCTION MAMMAPLASTY Bilateral 2016   • TONSILLECTOMY     • US GUIDED LYMPH NODE BIOPSY LEFT  5/26/2023   • US GUIDED THYROID BIOPSY  03/10/2023       Current Outpatient Medications   Medication Sig Dispense Refill   • amLODIPine (NORVASC) 10 mg tablet Take 1 tablet (10 mg total) by mouth daily 90 tablet 3   • atorvastatin (LIPITOR) 10 mg tablet TAKE 1 TABLET BY MOUTH EVERY DAY 90 tablet 1   • cholecalciferol (VITAMIN D3) 1,000 units tablet Take 2 tablets (2,000 Units total) by mouth daily 60 tablet 0   • hydrochlorothiazide (HYDRODIURIL) 25 mg tablet Take 1 tablet (25 mg total) by mouth daily 90 tablet 3   • lisinopril (ZESTRIL) 10 mg tablet Take 1 tablet (10 mg total) by mouth daily 90 tablet 3   • Magnesium 250 MG TABS Take 1 tablet (250 mg total) by mouth daily 90 tablet 3   • metoprolol tartrate (LOPRESSOR) 50 mg tablet Take 1 tablet (50 mg total) by mouth 2 (two) times a day 180 tablet 3   • Ozempic, 2 MG/DOSE, 8 MG/3ML SOPN INJECT 2 MG SUBCUTANEOUSLY ONE TIME PER WEEK 6 mL 3     No current facility-administered medications for this visit  Allergies   Allergen Reactions   • Pollen Extract Other (See Comments)     Seasonal        Review of Systems    Video Exam    There were no vitals filed for this visit      Physical Exam     Visit Time  Total Visit Duration: ***

## 2023-06-14 NOTE — H&P (VIEW-ONLY)
Virtual Regular Visit  Clearance for thyroidectomy in a patient who had a history of von Willebrand disease    52-year-old  female with history of partial hysterectomy secondary to menorrhagia, von Willebrand disease diagnosed many years ago, diabetes mellitus type 2, meniscus repair, epistaxis, hypertension, diverticulosis, cholecystectomy, seasonal allergy, 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 and possible for thyroidectomy  She required DDAVP for surgeries in the past  Her mother diagnosed with hemochromatosis     She does not smoke or drink     She does not take any hormonal therapy    1 June 2023 von Willebrand factor of 52% (), factor VIII activity of 62%, von Willebrand antigen of 82%  Verification of patient location:    Patient is located at Home in the following state in which I hold an active license [place]      Assessment/Plan:  1 (disease requiring DDAVP for some of the elective surgeries in the past, scheduled for thyroidectomy/hemithyroidectomy with possible lymph node dissection    Current von Willebrand activity of 52%    Patient needs DDAVP 20 mcg IV infusion over 30 minutes 1 hour prior to her surgery    Her mother diagnosed with hemochromatosis, the insurance is denying hemochromatosis gene profile the ferritin level in the range of 500  Problem List Items Addressed This Visit        Hematopoietic and Hemostatic    Von Willebrand disease ([facility] [address] ) - Primary            Reason for visit is   Chief Complaint   Patient presents with   • Virtual Regular Visit        Encounter provider Kiet Modi MD    Provider located at [address] [address] [phone]      Recent Visits  No visits were found meeting these conditions    Showing recent visits within past 7 days and meeting all other requirements  Today's Visits  Date Type Provider Dept   06/14/23 Telemedicine Kiet Modi MD [facility]   Showing today's visits and meeting all other requirements  Future Appointments  No visits were found meeting these conditions  Showing future appointments within next 150 days and meeting all other requirements       The patient was identified by name and date of birth  Ludin Plummer was informed that this is a telemedicine visit and that the visit is being conducted through the [employer]  She agrees to proceed     My office door was closed  No one else was in the room  She acknowledged consent and understanding of privacy and security of the video platform  The patient has agreed to participate and understands they can discontinue the visit at any time  Patient is aware this is a billable service  Subjective  Ludin Plummer is a 47 y o  female with von Willebrand disease        HPI     Past Medical History:   Diagnosis Date   • COVID-19    • Diverticulosis    • Dry eye    • Hypertension    • Seasonal allergies    • Thyroid nodule    • Von Willebrand disease ([facility] [address] )        Past Surgical History:   Procedure Laterality Date   • ARTHROSCOPIC REPAIR ACL Left    • CHOLECYSTECTOMY     • HYSTERECTOMY  2010    partial hysterectomy   • IR BIOPSY THYROID     • KNEE ARTHROSCOPY Right    • LIPOMA RESECTION      back   • REDUCTION MAMMAPLASTY Bilateral 2016   • TONSILLECTOMY     • US GUIDED LYMPH NODE BIOPSY LEFT  5/26/2023   • US GUIDED THYROID BIOPSY  03/10/2023       Current Outpatient Medications   Medication Sig Dispense Refill   • amLODIPine (NORVASC) 10 mg tablet Take 1 tablet (10 mg total) by mouth daily 90 tablet 3   • atorvastatin (LIPITOR) 10 mg tablet TAKE 1 TABLET BY MOUTH EVERY DAY 90 tablet 1   • cholecalciferol (VITAMIN D3) 1,000 units tablet Take 2 tablets (2,000 Units total) by mouth daily 60 tablet 0   • hydrochlorothiazide (HYDRODIURIL) 25 mg tablet Take 1 tablet (25 mg total) by mouth daily 90 tablet 3   • lisinopril (ZESTRIL) 10 mg tablet Take 1 tablet (10 mg total) by mouth daily 90 tablet 3   • Magnesium 250 MG TABS Take 1 tablet (250 mg total) by mouth daily 90 tablet 3   • metoprolol tartrate (LOPRESSOR) 50 mg tablet Take 1 tablet (50 mg total) by mouth 2 (two) times a day 180 tablet 3   • Ozempic, 2 MG/DOSE, 8 MG/3ML SOPN INJECT 2 MG SUBCUTANEOUSLY ONE TIME PER WEEK 6 mL 3     No current facility-administered medications for this visit  Allergies   Allergen Reactions   • Pollen Extract Other (See Comments)     Seasonal        Review of Systems  No easy bruisability, epistaxis, gingival bleeding  Video Exam    There were no vitals filed for this visit      Physical Exam   She is alert oriented x3

## 2023-06-19 RX ORDER — MOMETASONE FUROATE 50 UG/1
SPRAY, METERED NASAL
COMMUNITY
Start: 2023-06-02

## 2023-06-20 ENCOUNTER — TELEPHONE (OUTPATIENT)
Dept: SURGICAL ONCOLOGY | Facility: CLINIC | Age: 53
End: 2023-06-20

## 2023-06-20 NOTE — TELEPHONE ENCOUNTER
Called and left message to reschedule appointment with Dr Oro that she cancelled via [address]  Provided hope line number

## 2023-06-20 NOTE — PRE-PROCEDURE INSTRUCTIONS
Pre-Surgery Instructions:   Medication Instructions   • amLODIPine (NORVASC) 10 mg tablet Take day of surgery  • atorvastatin (LIPITOR) 10 mg tablet Take day of surgery  • cholecalciferol (VITAMIN D3) 1,000 units tablet Stop taking 2 days prior to surgery  • hydrochlorothiazide (HYDRODIURIL) 25 mg tablet Hold day of surgery  • lisinopril (ZESTRIL) 10 mg tablet Hold day of surgery  • Magnesium 250 MG TABS Stop taking 2 days prior to surgery  • metoprolol tartrate (LOPRESSOR) 50 mg tablet Take day of surgery  • mometasone (NASONEX) 50 mcg/act nasal spray Uses PRN- OK to take day of surgery   • Ozempic, 2 MG/DOSE, 8 MG/3ML SOPN Take day of surgery  Medication instructions for day surgery reviewed  Please use only a sip of water to take your instructed medications  Avoid all over the counter vitamins, supplements and NSAIDS for one week prior to surgery per anesthesia guidelines  Tylenol is ok to take as needed  You will receive a call one business day prior to surgery with an arrival time and hospital directions  If your surgery is scheduled on a Monday, the hospital will be calling you on the Friday prior to your surgery  If you have not heard from anyone by 8pm, please call the hospital supervisor through the hospital  at [phone]  [facility] [phone])  Do not eat or drink anything after midnight the night before your surgery, including candy, mints, lifesavers, or chewing gum  Do not drink alcohol 24hrs before your surgery  Try not to smoke at least 24hrs before your surgery  Follow the pre surgery showering instructions as listed in the [facility] Surgical Experience Booklet” or otherwise provided by your surgeon's office  Do not shave the surgical area 24 hours before surgery  Do not apply any lotions, creams, including makeup, cologne, deodorant, or perfumes after showering on the day of your surgery  No contact lenses, eye make-up, or artificial eyelashes   Remove nail polish, including gel polish, and any artificial, gel, or acrylic nails if possible  Remove all jewelry including rings and body piercing jewelry  Wear causal clothing that is easy to take on and off  Consider your type of surgery  Keep any valuables, jewelry, piercings at home  Please bring any specially ordered equipment (sling, braces) if indicated  Arrange for a responsible person to drive you to and from the hospital on the day of your surgery  Visitor Guidelines discussed  Call the surgeon's office with any new illnesses, exposures, or additional questions prior to surgery  Please reference your [facility] Surgical Experience Booklet” for additional information to prepare for your upcoming surgery

## 2023-06-22 ENCOUNTER — ANESTHESIA (OUTPATIENT)
Dept: PERIOP | Facility: HOSPITAL | Age: 53
End: 2023-06-22
Payer: COMMERCIAL

## 2023-06-22 ENCOUNTER — HOSPITAL ENCOUNTER (OUTPATIENT)
Facility: HOSPITAL | Age: 53
Setting detail: OUTPATIENT SURGERY
Discharge: HOME | End: 2023-06-22
Attending: STUDENT IN AN ORGANIZED HEALTH CARE EDUCATION/TRAINING PROGRAM | Admitting: STUDENT IN AN ORGANIZED HEALTH CARE EDUCATION/TRAINING PROGRAM
Payer: COMMERCIAL

## 2023-06-22 VITALS
HEART RATE: 98 BPM | RESPIRATION RATE: 17 BRPM | OXYGEN SATURATION: 92 % | TEMPERATURE: 97.6 F | SYSTOLIC BLOOD PRESSURE: 119 MMHG | BODY MASS INDEX: 40.97 KG/M2 | WEIGHT: 261 LBS | HEIGHT: 67 IN | DIASTOLIC BLOOD PRESSURE: 70 MMHG

## 2023-06-22 DIAGNOSIS — C73 THYROID CANCER (HCC): ICD-10-CM

## 2023-06-22 LAB
GLUCOSE SERPL-MCNC: 160 MG/DL (ref 65–140)
GLUCOSE SERPL-MCNC: 240 MG/DL (ref 65–140)

## 2023-06-22 PROCEDURE — 82948 REAGENT STRIP/BLOOD GLUCOSE: CPT

## 2023-06-22 PROCEDURE — 88307 TISSUE EXAM BY PATHOLOGIST: CPT | Performed by: PATHOLOGY

## 2023-06-22 RX ORDER — ONDANSETRON 2 MG/ML
INJECTION INTRAMUSCULAR; INTRAVENOUS AS NEEDED
Status: DISCONTINUED | OUTPATIENT
Start: 2023-06-22 | End: 2023-06-22

## 2023-06-22 RX ORDER — DEXAMETHASONE SODIUM PHOSPHATE 10 MG/ML
INJECTION, SOLUTION INTRAMUSCULAR; INTRAVENOUS AS NEEDED
Status: DISCONTINUED | OUTPATIENT
Start: 2023-06-22 | End: 2023-06-22

## 2023-06-22 RX ORDER — HYDROMORPHONE HCL IN WATER/PF 6 MG/30 ML
0.2 PATIENT CONTROLLED ANALGESIA SYRINGE INTRAVENOUS
Status: DISCONTINUED | OUTPATIENT
Start: 2023-06-22 | End: 2023-06-22 | Stop reason: HOSPADM

## 2023-06-22 RX ORDER — PROPOFOL 10 MG/ML
INJECTION, EMULSION INTRAVENOUS AS NEEDED
Status: DISCONTINUED | OUTPATIENT
Start: 2023-06-22 | End: 2023-06-22

## 2023-06-22 RX ORDER — CEFAZOLIN SODIUM 2 G/50ML
SOLUTION INTRAVENOUS AS NEEDED
Status: DISCONTINUED | OUTPATIENT
Start: 2023-06-22 | End: 2023-06-22

## 2023-06-22 RX ORDER — MAGNESIUM HYDROXIDE 1200 MG/15ML
LIQUID ORAL AS NEEDED
Status: DISCONTINUED | OUTPATIENT
Start: 2023-06-22 | End: 2023-06-22 | Stop reason: HOSPADM

## 2023-06-22 RX ORDER — ONDANSETRON 2 MG/ML
4 INJECTION INTRAMUSCULAR; INTRAVENOUS EVERY 6 HOURS PRN
Status: DISCONTINUED | OUTPATIENT
Start: 2023-06-22 | End: 2023-06-22 | Stop reason: HOSPADM

## 2023-06-22 RX ORDER — METOCLOPRAMIDE HYDROCHLORIDE 5 MG/ML
10 INJECTION INTRAMUSCULAR; INTRAVENOUS ONCE AS NEEDED
Status: DISCONTINUED | OUTPATIENT
Start: 2023-06-22 | End: 2023-06-22 | Stop reason: HOSPADM

## 2023-06-22 RX ORDER — SUCCINYLCHOLINE/SOD CL,ISO/PF 100 MG/5ML
SYRINGE (ML) INTRAVENOUS AS NEEDED
Status: DISCONTINUED | OUTPATIENT
Start: 2023-06-22 | End: 2023-06-22

## 2023-06-22 RX ORDER — PROPOFOL 10 MG/ML
INJECTION, EMULSION INTRAVENOUS CONTINUOUS PRN
Status: DISCONTINUED | OUTPATIENT
Start: 2023-06-22 | End: 2023-06-22

## 2023-06-22 RX ORDER — OXYCODONE HYDROCHLORIDE 5 MG/1
10 TABLET ORAL EVERY 6 HOURS PRN
Status: DISCONTINUED | OUTPATIENT
Start: 2023-06-22 | End: 2023-06-22 | Stop reason: HOSPADM

## 2023-06-22 RX ORDER — LIDOCAINE HYDROCHLORIDE 20 MG/ML
INJECTION, SOLUTION EPIDURAL; INFILTRATION; INTRACAUDAL; PERINEURAL AS NEEDED
Status: DISCONTINUED | OUTPATIENT
Start: 2023-06-22 | End: 2023-06-22

## 2023-06-22 RX ORDER — ACETAMINOPHEN 325 MG/1
650 TABLET ORAL EVERY 4 HOURS PRN
Status: DISCONTINUED | OUTPATIENT
Start: 2023-06-22 | End: 2023-06-22 | Stop reason: HOSPADM

## 2023-06-22 RX ORDER — HYDROMORPHONE HCL/PF 1 MG/ML
SYRINGE (ML) INJECTION AS NEEDED
Status: DISCONTINUED | OUTPATIENT
Start: 2023-06-22 | End: 2023-06-22

## 2023-06-22 RX ORDER — SODIUM CHLORIDE, SODIUM LACTATE, POTASSIUM CHLORIDE, CALCIUM CHLORIDE 600; 310; 30; 20 MG/100ML; MG/100ML; MG/100ML; MG/100ML
100 INJECTION, SOLUTION INTRAVENOUS CONTINUOUS
Status: CANCELLED | OUTPATIENT
Start: 2023-06-22

## 2023-06-22 RX ORDER — FENTANYL CITRATE/PF 50 MCG/ML
25 SYRINGE (ML) INJECTION
Status: COMPLETED | OUTPATIENT
Start: 2023-06-22 | End: 2023-06-22

## 2023-06-22 RX ORDER — ONDANSETRON 2 MG/ML
4 INJECTION INTRAMUSCULAR; INTRAVENOUS ONCE AS NEEDED
Status: DISCONTINUED | OUTPATIENT
Start: 2023-06-22 | End: 2023-06-22 | Stop reason: HOSPADM

## 2023-06-22 RX ORDER — FENTANYL CITRATE 50 UG/ML
INJECTION, SOLUTION INTRAMUSCULAR; INTRAVENOUS AS NEEDED
Status: DISCONTINUED | OUTPATIENT
Start: 2023-06-22 | End: 2023-06-22

## 2023-06-22 RX ORDER — SCOLOPAMINE TRANSDERMAL SYSTEM 1 MG/1
1 PATCH, EXTENDED RELEASE TRANSDERMAL ONCE
Status: DISCONTINUED | OUTPATIENT
Start: 2023-06-22 | End: 2023-06-22 | Stop reason: HOSPADM

## 2023-06-22 RX ORDER — SODIUM CHLORIDE, SODIUM LACTATE, POTASSIUM CHLORIDE, CALCIUM CHLORIDE 600; 310; 30; 20 MG/100ML; MG/100ML; MG/100ML; MG/100ML
100 INJECTION, SOLUTION INTRAVENOUS CONTINUOUS
Status: DISCONTINUED | OUTPATIENT
Start: 2023-06-22 | End: 2023-06-22 | Stop reason: HOSPADM

## 2023-06-22 RX ORDER — OXYCODONE HYDROCHLORIDE 5 MG/1
5 TABLET ORAL EVERY 4 HOURS PRN
Status: DISCONTINUED | OUTPATIENT
Start: 2023-06-22 | End: 2023-06-22 | Stop reason: HOSPADM

## 2023-06-22 RX ORDER — MIDAZOLAM HYDROCHLORIDE 2 MG/2ML
INJECTION, SOLUTION INTRAMUSCULAR; INTRAVENOUS AS NEEDED
Status: DISCONTINUED | OUTPATIENT
Start: 2023-06-22 | End: 2023-06-22

## 2023-06-22 RX ADMIN — OXYCODONE HYDROCHLORIDE 5 MG: 5 TABLET ORAL at 11:30

## 2023-06-22 RX ADMIN — PROPOFOL 20 MCG/KG/MIN: 10 INJECTION, EMULSION INTRAVENOUS at 08:04

## 2023-06-22 RX ADMIN — FENTANYL CITRATE 25 MCG: 50 INJECTION, SOLUTION INTRAMUSCULAR; INTRAVENOUS at 11:03

## 2023-06-22 RX ADMIN — SCOPALAMINE 1 PATCH: 1 PATCH, EXTENDED RELEASE TRANSDERMAL at 07:46

## 2023-06-22 RX ADMIN — FENTANYL CITRATE 50 MCG: 50 INJECTION, SOLUTION INTRAMUSCULAR; INTRAVENOUS at 08:02

## 2023-06-22 RX ADMIN — HYDROMORPHONE HYDROCHLORIDE 0.2 MG: 0.2 INJECTION, SOLUTION INTRAMUSCULAR; INTRAVENOUS; SUBCUTANEOUS at 11:46

## 2023-06-22 RX ADMIN — DESMOPRESSIN ACETATE 20 MCG: 4 SOLUTION INTRAVENOUS at 07:21

## 2023-06-22 RX ADMIN — SODIUM CHLORIDE, SODIUM LACTATE, POTASSIUM CHLORIDE, AND CALCIUM CHLORIDE 100 ML/HR: .6; .31; .03; .02 INJECTION, SOLUTION INTRAVENOUS at 07:11

## 2023-06-22 RX ADMIN — FENTANYL CITRATE 25 MCG: 50 INJECTION, SOLUTION INTRAMUSCULAR; INTRAVENOUS at 10:57

## 2023-06-22 RX ADMIN — DEXAMETHASONE SODIUM PHOSPHATE 10 MG: 10 INJECTION, SOLUTION INTRAMUSCULAR; INTRAVENOUS at 08:02

## 2023-06-22 RX ADMIN — FENTANYL CITRATE 25 MCG: 50 INJECTION, SOLUTION INTRAMUSCULAR; INTRAVENOUS at 11:25

## 2023-06-22 RX ADMIN — Medication 0.5 MG: at 08:19

## 2023-06-22 RX ADMIN — Medication 100 MG: at 08:02

## 2023-06-22 RX ADMIN — FENTANYL CITRATE 25 MCG: 50 INJECTION, SOLUTION INTRAMUSCULAR; INTRAVENOUS at 11:12

## 2023-06-22 RX ADMIN — CEFAZOLIN SODIUM 2000 MG: 2 SOLUTION INTRAVENOUS at 08:33

## 2023-06-22 RX ADMIN — ACETAMINOPHEN 650 MG: 325 TABLET, FILM COATED ORAL at 11:30

## 2023-06-22 RX ADMIN — LIDOCAINE HYDROCHLORIDE 100 MG: 20 INJECTION, SOLUTION EPIDURAL; INFILTRATION; INTRACAUDAL; PERINEURAL at 08:02

## 2023-06-22 RX ADMIN — ONDANSETRON 4 MG: 2 INJECTION INTRAMUSCULAR; INTRAVENOUS at 09:37

## 2023-06-22 RX ADMIN — PROPOFOL 200 MG: 10 INJECTION, EMULSION INTRAVENOUS at 08:02

## 2023-06-22 RX ADMIN — MIDAZOLAM HYDROCHLORIDE 2 MG: 2 INJECTION, SOLUTION INTRAMUSCULAR; INTRAVENOUS at 07:54

## 2023-06-22 NOTE — ANESTHESIA POSTPROCEDURE EVALUATION
Post-Op Assessment Note    CV Status:  Stable    Pain management: adequate  Multimodal analgesia used between 6 hours prior to anesthesia start to PACU discharge    Mental Status:  Alert and awake   Hydration Status:  Euvolemic   PONV Controlled:  Controlled   Airway Patency:  Patent      Post Op Vitals Reviewed: Yes      Staff: CRNA         No notable events documented      BP   147/94   Temp 98 3 °F (36 8 °C) (06/22/23 1010)    Pulse  87   Resp   14   SpO2   99

## 2023-06-22 NOTE — QUICK NOTE
Evaluated patient in [address] [address] [address] at bedside S/P left hemithyroidectomy with Dr Sonya Stewart  Patient is alert and coherent, in no acute distress  Currently reports 3/10 pain at her incision site which is tolerable  Patient denies numbness or tingling around her mouth, fingertips, and toes  Denies shortness of breath and difficulty swallowing  She reports that she is eating and drinking without concern as well as tolerating diet without N/V  Incision is covered in Steri-Strips that are not saturated in blood  No evidence of hematoma  Site is clean, dry, and intact  Patient ready for discharge and understands to go to the ED if she develops dysphagia, difficulty breathing, or hematoma under her incision  Patient knows to watch for any signs/symptoms of infection including fevers, chills, redness or drainage at site and to contact the office if she has any questions

## 2023-06-22 NOTE — INTERVAL H&P NOTE
H&P reviewed  After examining the patient I find no changes in the patients condition since the H&P had been written      Vitals:    06/22/23 0651   BP: 145/97   Pulse: 95   Resp: 18   Temp: (!) 97 4 °F (36 3 °C)   SpO2: 98%

## 2023-06-22 NOTE — DISCHARGE INSTR - AVS FIRST PAGE
Surgical Oncology Discharge Instructions    Please follow-up as instructed  If you do not already have a follow-up appointment, please call the office when you leave to schedule an appointment to be seen in 2 weeks for post-operative re-evaluation  Activity:  - No lifting greater than 20 pounds or strenuous physical activity or exercise for at least 4 weeks  - Walking and normal light activities are encouraged  - Normal daily activities including climbing steps are okay  - No driving until no longer using pain medications  Return to work:    - You may return to work in 2 weeks or sooner if you are feeling well enough  Diet:    - You may resume your normal diet  Wound Care:  - May shower daily  No tub baths or swimming until cleared by your surgeon   - Wash incision gently with soap and water and pat dry  - Do not apply any creams or ointments unless instructed to do so by your surgeon   - Singh Manifold may apply ice as needed (no longer than 20 minutes an hour) for the first 48 hours  - Bruising is not unusual and will go away with a little time  You may apply a warm, moist compress that may help the bruising resolve quicker  - You may remove the dressings the day after surgery (unless otherwise instructed)  Leave any skin tapes (steri-strips) on the incision(s) in place until they fall off on their own  Any new dressings are optional     Medications:    - You may resume all of your regular medications, including blood thinners and aspirin, after going home unless otherwise instructed  Please refer to your discharge medication list for further details  - Please take the pain medications as directed  - You are encouraged to use non-narcotic pain medications first and whenever possible  Reserve the use of narcotic pain medication for moderate to severe pain not controlled by non-narcotic medications   - No driving while taking narcotic pain medications    - You may become constipated, especially if taking pain medications  You may take any over the counter stool softeners or laxatives as needed  Examples: Milk of Magnesia, Colace, Senna  Additional Instructions:  - If you have any questions or concerns after discharge please call the office   - Call office or return to ER if fever greater than 101, chills, persistent nausea/vomiting, worsening/uncontrollable pain, and/or increasing redness or purulent/foul smelling drainage from incision(s), increased swelling of incision, difficulty breathing or swallowing

## 2023-06-22 NOTE — ANESTHESIA PREPROCEDURE EVALUATION
"Procedure:  HEMITHYROIDECTOMY; LEFT (Left: Neck)    Relevant Problems   CARDIO   (+) Essential hypertension   (+) Hypertriglyceridemia   (+) Mixed hyperlipidemia      ENDO   (+) Type 2 diabetes mellitus without complication, without long-term current use of insulin (HCC)      GI/HEPATIC   (+) Acid reflux      HEMATOLOGY   (+) Von Willebrand disease ([address] )      Lab Results   Component Value Date    WBC 8 40 05/02/2023    HGB 14 0 05/02/2023    HCT 43 6 05/02/2023    MCV 88 05/02/2023     05/02/2023     Lab Results   Component Value Date    K 3 8 04/24/2023    CO2 28 04/24/2023     04/24/2023    BUN 6 04/24/2023    CREATININE 0 64 04/24/2023     Lab Results   Component Value Date    INR 1 01 12/29/2020    INR 0 97 06/05/2020    PROTIME 13 1 12/29/2020    PROTIME 12 9 06/05/2020     Lab Results   Component Value Date    PTT 29 12/29/2020       No results found for: \"GLUCOSE\"    Lab Results   Component Value Date    HGBA1C 7 9 (H) 04/24/2023       Type and Screen:  O    Physical Exam    Airway    Mallampati score: II  TM Distance: >3 FB  Neck ROM: full     Dental   No notable dental hx     Cardiovascular      Pulmonary      Other Findings        Anesthesia Plan  ASA Score- 3     Anesthesia Type- general with ASA Monitors  Additional Monitors:   Airway Plan: ETT  Plan Factors-Exercise tolerance (METS): >4 METS  Chart reviewed  Existing labs reviewed  Patient summary reviewed  Induction- intravenous  Postoperative Plan- Plan for postoperative opioid use  Planned trial extubation    Informed Consent- Anesthetic plan and risks discussed with patient  I personally reviewed this patient with the CRNA  Discussed and agreed on the Anesthesia Plan with the CRNA  Debbie Almeida             "

## 2023-06-22 NOTE — OP NOTE
OPERATIVE REPORT  PATIENT NAME: Alon Garay    :  1970  MRN: [id number]  Pt Location: [facility] OR ROOM 07    SURGERY DATE: 2023    Surgeon(s) and Role:     * Ronal Jay MD - Primary     * Rebeca Boles MD - Assisting    Preop Diagnosis:  Thyroid cancer ([facility] [address] ) [C73]    Post-Op Diagnosis Codes: * Thyroid cancer ([facility] [address] ) [C73]    Procedure(s):  Left - HEMITHYROIDECTOMY; LEFT    Specimen(s):  ID Type Source Tests Collected by Time Destination   1 :  Tissue Thyroid, Left TISSUE EXAM Ronal Jay MD 2023 0840        Estimated Blood Loss:   Minimal    Drains:  * No LDAs found *    Anesthesia Type:   General    Operative Indications:  LFT thyroid nodule    Operative Findings:  Benign appearing LFT thyroid nodule    Complications:   None    Procedure and Technique:  The patient was identified in the operating suite and general endotracheal anesthesia was achieved  The arms were padded and tucked  The patient was then positioned with a shoulder roll behind the scapulae and the head in the sniffing position  The neck was then prepped and draped in the usual fashion  A time-out was performed  Following this, the skin at and around the planned cervical incision site was anesthetized with local anesthetic  Next, a 4 cm incision was made 2 fingerbreadths above the sternal notch in a fold of the neck  Cautery was used to dissect through the dermis and subcutaneous fat  The platysma was transected with cautery  We then created skin and subcutaneous flaps superiorly and inferiorly, deep to the platysma  Moist ray tecs and Lonestar retractors were placed  We then visualized the strap muscles, divided them at the midline, and mobilized them off the anterolateral aspect of the left thyroid lobe  Using traction on the superior pole we took down the superior pole vessels with Harmonic Scalpel  We rotated the gland anteromedially, dividing the middle thyroid vein with Harmonic   We identified the parathyroid glands and preserved them by means of close capsular dissection  We visualized recurrent laryngeal nerve as we rolled the lobe medially  The tissue between the lobe and trachea was divided until the lobe was freed, tethered only by the ligament of berry  The ligament of berry was then clamped, divided sharply, and suture ligated with 3-0 Vicryl  The lobe was then freed from anterior surface of the trachea with cautery  The specimen was then passed off the field  The field was then copiously irrigated and suctioned to confirm hemostasis  Surgicel was placed in the operative field  Closure was performed using 3-0 Vicryl to close the strap muscles in the midline, followed by running 4-0 Vicryl to close the platysma, followed by a 4-0 vicryl to close the dermis in interrupted fashion, followed by 4-0 stratafix placed in running subcuticular fashion to close the skin  Skin glue was then placed, followed by Steri-Strips when the glue was dry  The patient tolerated the procedure well  I was present for the entire procedure      Patient Disposition:  PACU         SIGNATURE: Gisela Almeida MD  DATE: June 22, 2023  TIME: 4:40 PM

## 2023-06-23 ENCOUNTER — TELEPHONE (OUTPATIENT)
Dept: HEMATOLOGY ONCOLOGY | Facility: CLINIC | Age: 53
End: 2023-06-23

## 2023-06-23 NOTE — TELEPHONE ENCOUNTER
Call Transfer   Who are you speaking with? Patient   If it is not the patient, are they listed on an active communication consent form? Yes   Who is the patients HemOnc/SurgOnc provider? Dr Alyse Sams   What is the reason for this call? Questions about surgery yesterday   Person/Department that the call was transferred to? Time that call was transferred? Joni Fermin 10:08am 6/23   Your call will be transferred now  If you receive a voicemail, please leave a detailed message and a member of the team will return your call as soon as possible  Did you relay this information to the caller?   Yes

## 2023-06-23 NOTE — TELEPHONE ENCOUNTER
Call Transfer   Who are you speaking with? Patient   If it is not the patient, are they listed on an active communication consent form? N/A   Who is the patients HemOnc/SurgOnc provider? Dr Elizabeth Acevedo   What is the reason for this call? Patient calling for clarification on when she should come for post op visit from her surgery   Person/Department that the call was transferred to? Time that call was transferred? Zeb Melvin @ 10:53AM   Your call will be transferred now  If you receive a voicemail, please leave a detailed message and a member of the team will return your call as soon as possible  Did you relay this information to the caller?   Yes

## 2023-06-27 PROCEDURE — 88307 TISSUE EXAM BY PATHOLOGIST: CPT | Performed by: PATHOLOGY

## 2023-07-05 PROBLEM — C73 PAPILLARY MICROCARCINOMA OF THYROID (HCC): Status: ACTIVE | Noted: 2023-07-05

## 2023-07-06 ENCOUNTER — OFFICE VISIT (OUTPATIENT)
Dept: SURGICAL ONCOLOGY | Facility: CLINIC | Age: 53
End: 2023-07-06

## 2023-07-06 VITALS
HEART RATE: 94 BPM | SYSTOLIC BLOOD PRESSURE: 122 MMHG | OXYGEN SATURATION: 96 % | HEIGHT: 67 IN | RESPIRATION RATE: 16 BRPM | DIASTOLIC BLOOD PRESSURE: 70 MMHG | TEMPERATURE: 97.5 F | WEIGHT: 261 LBS | BODY MASS INDEX: 40.97 KG/M2

## 2023-07-06 DIAGNOSIS — C73 THYROID CANCER (HCC): Primary | ICD-10-CM

## 2023-07-06 PROCEDURE — 99024 POSTOP FOLLOW-UP VISIT: CPT | Performed by: STUDENT IN AN ORGANIZED HEALTH CARE EDUCATION/TRAINING PROGRAM

## 2023-07-06 NOTE — PROGRESS NOTES
Surgical Oncology Consultation    [address] [facility]  [facility]  [address] [facility] [place] [address]    Patient:  Gene Deluca  1970  [id number]    Primary Care provider:  Kaya Torrez DO (Inactive)  [address] [facility]    Referring provider:  No referring provider defined for this encounter. Diagnoses and all orders for this visit:    Thyroid cancer [facility])        Chief Complaint   Patient presents with   • Post-op       No follow-ups on file. Oncology History   Thyroid cancer ([address])   6/22/2023 Surgery    A. Left thyroid, left hemithyroidectomy:  -   Incidental papillary thyroid microcarcinoma, follicular variant, 1.7 mm in greatest dimension, completely excised (see comment). -   Follicular adenomas with Hürthle cell feature, 1.4 cm and 1.0 cm in greatest dimension, completely excised. -   Resection margins are uninvolved. -   Background thyroid gland with patchy chronic inflammation and a few reactive lymphoid follicles. -   One parathyroid gland with no significant histopathologic change. -   One lymph node, negative for metastatic carcinoma (0/1).      7/5/2023 Initial Diagnosis    Thyroid cancer ([facility])         History of Present Illness  :   48 yo female with history of thyroid nodule. First detected several years ago and followed with serial US. Most recently a left thyroid nodule appeared to grow in size, reaching 1.4 cm TR4 and meeting criteria for biopsy. An additional left thyroid nodule remained unchanged. 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 She comes today for additional recs. Essentially asx. No hoarseness, trouble swallowing, pain with swallowing, lumps or bumps of the head or neck. No hx XRT to the neck. Hx vWF - has not had significant bleeding with previous procedures. Interval:  LN US with suspicious node; bx was negative.  Pt proceeded for left thyroidectomy with papillary microcarcinoma and follicular hurtle cell adenoma. Received factor before surgery and had no bleeding complications. Doing well today. No s/s infection. No voice changes, trouble swallowing    Review of Systems  Complete ROS Surg Onc:   Constitutional: The patient denies new or recent history of general fatigue, no recent weight loss, no change in appetite. Eyes: No complaints of visual problems, no scleral icterus. ENT: No complaints of ear pain, no hoarseness, no difficulty swallowing,  no tinnitus and no new masses in head, oral cavity, or neck. Cardiovascular: No complaints of chest pain, no palpitations, no ankle edema. Respiratory: No complaints of shortness of breath, no cough. Gastrointestinal: No complaints of jaundice, no bloody stools, no pale stools. Genitourinary: No complaints of dysuria, no hematuria, no nocturia, no frequent urination, no urethral discharge. Musculoskeletal: No complaints of weakness, paralysis, joint stiffness or arthralgias. Integumentary: No complaints of rash, no new lesions. Neurological: No complaints of convulsions, no seizures, no dizziness. Hematologic/Lymphatic: No complaints of easy bruising. Endocrine:  No hot or cold intolerance. No polydipsia, polyphagia, or polyuria. Allergy/immunology:  No environmental allergies. No food allergies. Not immunocompromised.       Patient Active Problem List   Diagnosis   • Essential hypertension   • Morbid obesity ([address])   • Type 2 diabetes mellitus without complication, without long-term current use of insulin ([address])   • Von Willebrand disease ([address])   • Acid reflux   • Hypertriglyceridemia   • Multiple thyroid nodules   • Pulmonary nodule   • Mixed hyperlipidemia   • Thyroid nodule   • Preoperative cardiovascular examination   • Thyroid cancer [facility])     Past Medical History:   Diagnosis Date   • COVID-19    • Diverticulosis    • Dry eye    • Hypertension    • PONV (postoperative nausea and vomiting)    • Seasonal allergies    • Thyroid nodule    • Von Willebrand disease ([address])      Past Surgical History:   Procedure Laterality Date   • ARTHROSCOPIC REPAIR ACL Left    • CHOLECYSTECTOMY     • COLONOSCOPY     • HYSTERECTOMY  2010    partial hysterectomy   • IR BIOPSY THYROID     • KNEE ARTHROSCOPY Right    • LIPOMA RESECTION      back   • REDUCTION MAMMAPLASTY Bilateral 2016   • THYROID LOBECTOMY Left 6/22/2023    Procedure: HEMITHYROIDECTOMY; LEFT;  Surgeon: Erica Cohen MD;  Location: [facility];  Service: Surgical Oncology   • TONSILLECTOMY     • US GUIDED LYMPH NODE BIOPSY LEFT  05/26/2023   • US GUIDED THYROID BIOPSY  03/10/2023     Family History   Problem Relation Age of Onset   • Hypertension Mother    • Hyperlipidemia Mother    • Diabetes type II Mother    • Breast cancer Mother    • Thyroid cancer Mother    • Hypertension Father    • Heart attack Father    • Hyperlipidemia Father    • Lung cancer Father    • Hypertension Maternal Grandmother    • Cancer Maternal Grandmother    • No Known Problems Maternal Grandfather    • No Known Problems Paternal Grandmother    • No Known Problems Paternal Grandfather    • Skin cancer Maternal Aunt    • No Known Problems Paternal Aunt      Social History     Socioeconomic History   • Marital status: Single     Spouse name: Not on file   • Number of children: Not on file   • Years of education: Not on file   • Highest education level: Not on file   Occupational History   • Not on file   Tobacco Use   • Smoking status: Never   • Smokeless tobacco: Never   Vaping Use   • Vaping Use: Never used   Substance and Sexual Activity   • Alcohol use: Yes     Comment: social   • Drug use: Never   • Sexual activity: Not Currently     Comment: Defer   Other Topics Concern   • Not on file   Social History Narrative   • Not on file     Social Determinants of Health     Financial Resource Strain: Not on file   Food Insecurity: Not on file Transportation Needs: Not on file   Physical Activity: Not on file   Stress: Not on file   Social Connections: Not on file   Intimate Partner Violence: Not on file   Housing Stability: Not on file       Current Outpatient Medications:   •  amLODIPine (NORVASC) 10 mg tablet, Take 1 tablet (10 mg total) by mouth daily, Disp: 90 tablet, Rfl: 3  •  atorvastatin (LIPITOR) 10 mg tablet, TAKE 1 TABLET BY MOUTH EVERY DAY, Disp: 90 tablet, Rfl: 1  •  cholecalciferol (VITAMIN D3) 1,000 units tablet, Take 2 tablets (2,000 Units total) by mouth daily, Disp: 60 tablet, Rfl: 0  •  hydrochlorothiazide (HYDRODIURIL) 25 mg tablet, Take 1 tablet (25 mg total) by mouth daily, Disp: 90 tablet, Rfl: 3  •  lisinopril (ZESTRIL) 10 mg tablet, Take 1 tablet (10 mg total) by mouth daily, Disp: 90 tablet, Rfl: 3  •  Magnesium 250 MG TABS, Take 1 tablet (250 mg total) by mouth daily, Disp: 90 tablet, Rfl: 3  •  metoprolol tartrate (LOPRESSOR) 50 mg tablet, Take 1 tablet (50 mg total) by mouth 2 (two) times a day, Disp: 180 tablet, Rfl: 3  •  mometasone (NASONEX) 50 mcg/act nasal spray, , Disp: , Rfl:   •  Ozempic, 2 MG/DOSE, 8 MG/3ML SOPN, INJECT 2 MG SUBCUTANEOUSLY ONE TIME PER WEEK, Disp: 6 mL, Rfl: 3  Allergies   Allergen Reactions   • Pollen Extract Other (See Comments)     Seasonal        Vitals:    07/06/23 0914   BP: 122/70   Pulse: 94   Resp: 16   Temp: 97.5 °F (36.4 °C)   SpO2: 96%       Physical Exam   General: Appears well, appears stated age  Skin: Warm, anicteric  HEENT: Normocephalic, atraumatic; sclera aniceteric, mucous membranes moist; cervical nodes without adenopathy. No nodules appreciated.  Incision healing well  Cardiopulmonary: RRR, Easy WOB, no BLE edema  Abd: Flat and soft, nontender, no masses appreciated, no hepatosplenomegaly  MSK: Symmetric, no cyanosis, no overt weakness  Lymphatic: No cervical, axillary or inguinal lymphadenopathy  Neuro: Affect appropriate, no gross motor abnormalities      Pathology:  Final Diagnosis   A. & B. Thyroid, Left, Lower Pole: (Thin-Prep and smears)  Follicular neoplasm/Suspicious for follicular neoplasm ([facility] Category IV)  - See note. Specimen consists almost exclusively of Hurthle cells. Colloid and mixed inflammatory cells.     Satisfactory for evaluation. Afirma 50% risk of malignancy    Final Diagnosis   A. Left thyroid, left hemithyroidectomy:  -   Incidental papillary thyroid microcarcinoma, follicular variant, 1.7 mm in greatest dimension, completely excised (see comment). -   Follicular adenomas with Hürthle cell feature, 1.4 cm and 1.0 cm in greatest dimension, completely excised. -   Resection margins are uninvolved. -   Background thyroid gland with patchy chronic inflammation and a few reactive lymphoid follicles. -   One parathyroid gland with no significant histopathologic change.   -   One lymph node, negative for metastatic carcinoma (0/1). Labs: Reviewed in EPIC    Imaging  No results found. I independently reviewed and interpreted the above laboratory and imaging data, incl thyroid US, endo notes, path, afirma      Discussion/Summary:   S/p left thyroidectomy. Incidental microcarcinoma papillary. Hurthle cell was adenoma only. US in 6 mo. Will f/u with endo. All questions answered.

## 2023-08-17 ENCOUNTER — APPOINTMENT (OUTPATIENT)
Dept: LAB | Facility: HOSPITAL | Age: 53
End: 2023-08-17
Payer: COMMERCIAL

## 2023-08-17 DIAGNOSIS — E11.9 TYPE 2 DIABETES MELLITUS WITHOUT COMPLICATION, WITHOUT LONG-TERM CURRENT USE OF INSULIN (HCC): ICD-10-CM

## 2023-08-17 LAB
ANION GAP SERPL CALCULATED.3IONS-SCNC: 7 MMOL/L
BUN SERPL-MCNC: 8 MG/DL (ref 5–25)
CALCIUM SERPL-MCNC: 8.8 MG/DL (ref 8.4–10.2)
CHLORIDE SERPL-SCNC: 103 MMOL/L (ref 96–108)
CHOLEST SERPL-MCNC: 181 MG/DL
CO2 SERPL-SCNC: 29 MMOL/L (ref 21–32)
CREAT SERPL-MCNC: 0.68 MG/DL (ref 0.6–1.3)
CREAT UR-MCNC: 99.9 MG/DL
EST. AVERAGE GLUCOSE BLD GHB EST-MCNC: 157 MG/DL
GFR SERPL CREATININE-BSD FRML MDRD: 100 ML/MIN/1.73SQ M
GLUCOSE P FAST SERPL-MCNC: 129 MG/DL (ref 65–99)
HBA1C MFR BLD: 7.1 %
HDLC SERPL-MCNC: 37 MG/DL
LDLC SERPL CALC-MCNC: 74 MG/DL (ref 0–100)
MICROALBUMIN UR-MCNC: 6.3 MG/L (ref 0–20)
MICROALBUMIN/CREAT 24H UR: 6 MG/G CREATININE (ref 0–30)
POTASSIUM SERPL-SCNC: 3.9 MMOL/L (ref 3.5–5.3)
SODIUM SERPL-SCNC: 139 MMOL/L (ref 135–147)
TRIGL SERPL-MCNC: 350 MG/DL

## 2023-08-17 PROCEDURE — 36415 COLL VENOUS BLD VENIPUNCTURE: CPT

## 2023-08-17 PROCEDURE — 80061 LIPID PANEL: CPT

## 2023-08-17 PROCEDURE — 82043 UR ALBUMIN QUANTITATIVE: CPT

## 2023-08-17 PROCEDURE — 82570 ASSAY OF URINE CREATININE: CPT

## 2023-08-17 PROCEDURE — 83036 HEMOGLOBIN GLYCOSYLATED A1C: CPT

## 2023-08-17 PROCEDURE — 80048 BASIC METABOLIC PNL TOTAL CA: CPT

## 2023-08-23 ENCOUNTER — OFFICE VISIT (OUTPATIENT)
Dept: ENDOCRINOLOGY | Facility: CLINIC | Age: 53
End: 2023-08-23
Payer: COMMERCIAL

## 2023-08-23 VITALS
SYSTOLIC BLOOD PRESSURE: 108 MMHG | DIASTOLIC BLOOD PRESSURE: 64 MMHG | HEIGHT: 67 IN | WEIGHT: 256 LBS | BODY MASS INDEX: 40.18 KG/M2 | HEART RATE: 82 BPM

## 2023-08-23 DIAGNOSIS — E78.2 MIXED HYPERLIPIDEMIA: ICD-10-CM

## 2023-08-23 DIAGNOSIS — C73 THYROID CANCER (HCC): ICD-10-CM

## 2023-08-23 DIAGNOSIS — E11.9 TYPE 2 DIABETES MELLITUS WITHOUT COMPLICATION, WITHOUT LONG-TERM CURRENT USE OF INSULIN (HCC): Primary | ICD-10-CM

## 2023-08-23 DIAGNOSIS — I10 ESSENTIAL HYPERTENSION: ICD-10-CM

## 2023-08-23 PROCEDURE — 99214 OFFICE O/P EST MOD 30 MIN: CPT | Performed by: STUDENT IN AN ORGANIZED HEALTH CARE EDUCATION/TRAINING PROGRAM

## 2023-08-23 NOTE — LETTER
August 23, 2023       No Recipients    Patient: Joss Medrano   YOB: 1970   Date of Visit: 8/23/2023       Dear Dr. Red Quintero Recipients: Thank you for referring Joss Medrano to me for evaluation. Below are my notes for this consultation. If you have questions, please do not hesitate to call me. I look forward to following your patient along with you. Sincerely,        David Hand [facility],         CC:   No Recipients    [address],   8/23/2023  5:04 PM  Incomplete   Joss Medrano 47 y.o. female MRN: [id number]    Encounter: [id number]      Assessment/Plan     1. Type 2 diabetes - improving. Greg Coffman will continue therapy with ozempic 2 mg weekly. She is motivated to improve dietary habits, and has goal for additional weight loss. We will follow up testing in 3-mo for monitoring, however she may contact me sooner with concerns. 2. Hypertension - improving. Given weight loss, could consider de-escalation of anti-hypertensive regimen. Greg Coffman will f/u with PCP regarding this. 3. Hyperlipidemia - continue statin therapy    4. Obesity - improving    5. 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 She has a follow up US arranged for ~6 mo. May treat to normal TSH.      Problem List Items Addressed This Visit          Endocrine    Type 2 diabetes mellitus without complication, without long-term current use of insulin (HCC) - Primary    Relevant Medications    semaglutide, 2 mg/dose, (Ozempic, 2 MG/DOSE,) 8 mg/ mL injection pen    Other Relevant Orders    Basic metabolic panel Lab Collect    HEMOGLOBIN A1C W/ EAG ESTIMATION Lab Collect    Thyroid cancer ([address])    Relevant Orders    T4, free Lab Collect    TSH, 3rd generation Lab Collect       Cardiovascular and Mediastinum    Essential hypertension       Other    Mixed hyperlipidemia     RTC 4-mo    CC: Diabetes    History of Present Illness     HPI:    Ranjan Chester returns for follow up of diabetes. As always, she is here today with her mother. Jericho Wilson is sp partial thyroidectomy by Dr. Kenan Peralta. She is healing well, but notes some symptoms of fatigue. Pathology revealed hurthle cell adenoma with incidental microcarcinoma. Jericho Wilson has not yet had post-operative thyroid studies. For diabetes she is taking ozempic 2 mg weekly. She had prior intolerance to metformin. Her most recent a1c improved to 7.1% and Jericho Wilson has been losing weight steadily. She hopes to have her a1c below 7%. She does acknowledge some difficulty with adherence to diet. Last eye examination June 1st at [address] [facility]. For hypertension she takes amlodipine 10 mg daily, HCTZ 25 mg daily, lisinopril 10 mg daily, and metoprolol 50 mg bid. For hyperlipidemia she takes lipitor 10 mg daily. Review of Systems   Constitutional: Negative for diaphoresis and unexpected weight change. HENT: Negative for trouble swallowing and voice change. Cardiovascular: Negative for palpitations. Gastrointestinal: Negative for nausea and vomiting. Endocrine: Negative for polydipsia and polyuria. Psychiatric/Behavioral: Negative for agitation and behavioral problems. All other systems reviewed and are negative.       Historical Information   Past Medical History:   Diagnosis Date    COVID-19     Diverticulosis     Dry eye     Hypertension     PONV (postoperative nausea and vomiting)     Seasonal allergies     Thyroid nodule     Von Willebrand disease ([address])      Past Surgical History:   Procedure Laterality Date    ARTHROSCOPIC REPAIR ACL Left     CHOLECYSTECTOMY      COLONOSCOPY      HYSTERECTOMY  2010    partial hysterectomy    IR BIOPSY THYROID      KNEE ARTHROSCOPY Right     LIPOMA RESECTION      back    REDUCTION MAMMAPLASTY Bilateral 2016    THYROID LOBECTOMY Left 6/22/2023    Procedure: HEMITHYROIDECTOMY; LEFT;  Surgeon: Mayito Mojica MD;  Location: [facility];  Service: Surgical Oncology    TONSILLECTOMY      US GUIDED LYMPH NODE BIOPSY LEFT  05/26/2023    US GUIDED THYROID BIOPSY  03/10/2023     Social History   Social History     Substance and Sexual Activity   Alcohol Use Yes    Comment: social     Social History     Substance and Sexual Activity   Drug Use Never     Social History     Tobacco Use   Smoking Status Never   Smokeless Tobacco Never     Family History:   Family History   Problem Relation Age of Onset    Hypertension Mother     Hyperlipidemia Mother     Diabetes type II Mother     Breast cancer Mother     Thyroid cancer Mother     Hypertension Father     Heart attack Father     Hyperlipidemia Father     Lung cancer Father     Hypertension Maternal Grandmother     Cancer Maternal Grandmother     No Known Problems Maternal Grandfather     No Known Problems Paternal Grandmother     No Known Problems Paternal Grandfather     Skin cancer Maternal Aunt     No Known Problems Paternal Aunt        Meds/Allergies   Current Outpatient Medications   Medication Sig Dispense Refill    amLODIPine (NORVASC) 10 mg tablet Take 1 tablet (10 mg total) by mouth daily 90 tablet 3    atorvastatin (LIPITOR) 10 mg tablet TAKE 1 TABLET BY MOUTH EVERY DAY 90 tablet 1    cholecalciferol (VITAMIN D3) 1,000 units tablet Take 2 tablets (2,000 Units total) by mouth daily 60 tablet 0    hydrochlorothiazide (HYDRODIURIL) 25 mg tablet Take 1 tablet (25 mg total) by mouth daily 90 tablet 3    lisinopril (ZESTRIL) 10 mg tablet Take 1 tablet (10 mg total) by mouth daily 90 tablet 3    Magnesium 250 MG TABS Take 1 tablet (250 mg total) by mouth daily 90 tablet 3    metoprolol tartrate (LOPRESSOR) 50 mg tablet Take 1 tablet (50 mg total) by mouth 2 (two) times a day 180 tablet 3    semaglutide, 2 mg/dose, (Ozempic, 2 MG/DOSE,) 8 mg/ mL injection pen Inject 0.75 mL (2 mg total) under the skin every 7 days 9 mL 3    mometasone (NASONEX) 50 mcg/act nasal spray  (Patient not taking: Reported on 8/23/2023)       No current facility-administered medications for this visit. Allergies   Allergen Reactions    Pollen Extract Other (See Comments)     Seasonal        Objective   Vitals: Blood pressure 108/64, pulse 82, height 5' 7" (1.702 m), weight 116 kg (256 lb). Physical Exam  Vitals and nursing note reviewed. Constitutional:       Appearance: Normal appearance. Comments: Wears glasses. HENT:      Head: Normocephalic. Mouth/Throat:      Mouth: Mucous membranes are moist.   Eyes:      General: No scleral icterus. Conjunctiva/sclera: Conjunctivae normal.   Neck:      Thyroid: No thyroid mass, thyromegaly or thyroid tenderness. Cardiovascular:      Rate and Rhythm: Normal rate and regular rhythm. Pulmonary:      Effort: Pulmonary effort is normal.      Breath sounds: Normal breath sounds. Skin:     General: Skin is warm and dry. Neurological:      General: No focal deficit present. Mental Status: She is alert. Psychiatric:         Mood and Affect: Mood normal.           The history was obtained from the review of the chart, patient.     Lab Results:   Lab Results   Component Value Date/Time    Hemoglobin A1C 7.1 (H) 08/17/2023 10:04 AM    Hemoglobin A1C 7.9 (H) 04/24/2023 08:21 AM    Hemoglobin A1C 6.8 (H) 11/29/2022 08:42 AM    WBC 8.40 05/02/2023 11:47 AM    Hemoglobin 14.0 05/02/2023 11:47 AM    Hematocrit 43.6 05/02/2023 11:47 AM    MCV 88 05/02/2023 11:47 AM    Platelets 544 00/93/1545 11:47 AM    BUN 8 08/17/2023 10:04 AM    BUN 6 04/24/2023 08:21 AM    BUN 13 11/29/2022 08:42 AM    Potassium 3.9 08/17/2023 10:04 AM    Potassium 3.8 04/24/2023 08:21 AM    Potassium 4.1 11/29/2022 08:42 AM    Chloride 103 08/17/2023 10:04 AM    Chloride 104 04/24/2023 08:21 AM    Chloride 103 11/29/2022 08:42 AM    CO2 29 08/17/2023 10:04 AM    CO2 28 04/24/2023 08:21 AM    CO2 28 11/29/2022 08:42 AM    Creatinine 0.68 08/17/2023 10:04 AM    Creatinine 0.64 04/24/2023 08:21 AM    Creatinine 0.82 11/29/2022 08:42 AM    HDL, Direct 37 (L) 08/17/2023 10:04 AM    Triglycerides 350 (H) 08/17/2023 10:04 AM     Lab Results   Component Value Date    LDLCALC 74 08/17/2023       3.10.23      Final Diagnosis   A. & B. Thyroid, Left, Lower Pole: (Thin-Prep and smears)  Follicular neoplasm/Suspicious for follicular neoplasm ([place] Category IV)  - See note. Specimen consists almost exclusively of Hurthle cells. Colloid and mixed inflammatory cells. Satisfactory for evaluation. Note:  (1) As reported in the [address] for Reporting Thyroid Cytopathology* this diagnostic category has demonstrated anywhere from 25-40% risk of malignancy being found in subsequent resections and/or FNA. This risk of malignancy is expected to change due to the usage of the surgical pathology diagnosis of “non-invasive follicular thyroid neoplasm with papillary-like nuclear features (NIFTP). ”  The anticipated risk of malignancy secondary to NIFTP is 10-40%. The histologic follow-up of cases diagnosed as follicular neoplasm/suspicious for follicular neoplasm includes follicular adenoma, follicular carcinoma, and follicular variant of papillary thyroid carcinoma including the recently described indolent counterpart, NIFTP. The manual reports that the usual management following this diagnosis is genetic testing or lobectomy. Ultimately, clinical/imaging correlation for this patient is needed in arriving at the actual management plan. Imaging Studies:     11/6/2020    THYROID ULTRASOUND     INDICATION:    E04.1: Nontoxic single thyroid nodule. Follow-up nodules       COMPARISON:  4/9/2019 outside study; 7/24/2017; CT neck from 6/5/2020     TECHNIQUE:   Ultrasound of the thyroid was performed with a high frequency linear transducer in transverse and sagittal planes including volumetric imaging sweeps as well as traditional still imaging technique.      FINDINGS:  Mildly heterogeneous gland     Right lobe:  4 x 1.6 x 1.7 cm.  5.2 mL  Left lobe:  4.4 x 1.5 x 1.4 cm.  4.5 mL  Isthmus:  0.2 cm. Nodule #1. Image 8. Right midpole measuring 1.1 x 0.7 x 1 cm. Previously 0.9 x 0.5 x 0.6   COMPOSITION:  0 points, spongiform. ECHOGENICITY:  Not applicable when spongiform composition. SHAPE:  0 points, wider-than-tall. MARGIN: 0 points, smooth. ECHOGENIC FOCI:  0 points, none or large comet-tail artifacts. TI-RADS Classification: TR 1 (0 points), Benign. No FNA. Nodule #2. Image 30. Left mid pole measuring 0.9 x 0.7 x 0.7 cm. This is visible on the prior study on volumetric sweeps and appears unchanged in size. This could also simply represent a lobule rather than a true nodule. COMPOSITION:  2 points, solid or almost completely solid . ECHOGENICITY:  1 point, hyperechoic or isoechoic. SHAPE:  0 points, wider-than-tall. MARGIN: 0 points, smooth. ECHOGENIC FOCI:  0 points, none or large comet-tail artifacts. TI-RADS Classification: TR 3 (3 points), FNA if >2.5 cm. Follow if >1.5 cm. Nodule #3. Image 35. Left lower pole measuring 1.0 x 0.9 x 0.7 cm.  0.9 x 0.9 x 0.9 in 2019 (remeasured similarly to the current study). 0.9 x 0.8 x 0.7 cm in 2017. COMPOSITION:  2 points, solid or almost completely solid . ECHOGENICITY:  1 point, hyperechoic or isoechoic. SHAPE:  3 points, taller-than-wide. MARGIN: 0 points, smooth. ECHOGENIC FOCI:  0 points, none or large comet-tail artifacts. TI-RADS Classification: TR 4 (4-6 points), FNA if > 1.5 cm. Follow if > 1cm. IMPRESSION:     TR 4 lesion in the lower pole the left thyroid does not reach size criteria for biopsy and appears not significantly changed in size given differences in measurement technique. Follow-up in one year is advised. Reference: ACR Thyroid Imaging, Reporting and Data System (TI-RADS): White Paper of the [facility]. J AM Russ Radiol 1837;09:278-435. (additional recommendations based on American Thyroid Association 2015 guidelines.)    Portions of the record may have been created with voice recognition software. Occasional wrong word or "sound a like" substitutions may have occurred due to the inherent limitations of voice recognition software. Read the chart carefully and recognize, using context, where substitutions have occurred. [address] [place] [address]  8/23/2023  4:55 PM  Sign when Signing Visit   Neela Walsh 47 y.o. female MRN: [id number]    Encounter: [id number]      Assessment/Plan    1. Type 2 diabetes - improving. Radha Wright will continue therapy with ozempic 2 mg weekly. She is motivated to improve dietary habits, and has goal for additional weight loss. We will follow up testing in 3-mo for monitoring, however she may contact me sooner with concerns. 2. Hypertension - improving. Given weight loss, could consider de-escalation of anti-hypertensive regimen. Radha Wright will f/u with PCP regarding this. 3. Hyperlipidemia - continue statin therapy    4. Obesity - improving    5. 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 She has a follow up US arranged for ~6 mo. May treat to normal TSH.      Problem List Items Addressed This Visit          Endocrine    Type 2 diabetes mellitus without complication, without long-term current use of insulin ([address]) - Primary    Relevant Medications    semaglutide, 2 mg/dose, (Ozempic, 2 MG/DOSE,) 8 mg/ mL injection pen    Other Relevant Orders    Basic metabolic panel Lab Collect    HEMOGLOBIN A1C W/ EAG ESTIMATION Lab Collect    Thyroid cancer ([address])    Relevant Orders    T4, free Lab Collect    TSH, 3rd generation Lab Collect       Cardiovascular and Mediastinum    Essential hypertension       Other    Mixed hyperlipidemia     RTC 4-mo    CC: Diabetes    History of Present Illness    HPI:    Gene Mak returns for follow up of diabetes. As always, she is here today with her mother. Queen Radha is sp partial thyroidectomy by Dr. Deshaun Canseco. She is healing well, but notes some symptoms of fatigue. Pathology revealed hurthle cell adenoma with incidental microcarcinoma. Queen Radha has not yet had post-operative thyroid studies. For diabetes she is taking ozempic 2 mg weekly. She had prior intolerance to metformin. Her most recent a1c improved to 7.1% and Queen Radha has been losing weight steadily. She hopes to have her a1c below 7%. She does acknowledge some difficulty with adherence to diet. Last eye examination June 1st at [address] [facility]. For hypertension she takes amlodipine 10 mg daily, HCTZ 25 mg daily, lisinopril 10 mg daily, and metoprolol 50 mg bid. For hyperlipidemia she takes lipitor 10 mg daily. Review of Systems   Constitutional: Negative for diaphoresis and unexpected weight change. HENT: Negative for trouble swallowing and voice change. Cardiovascular: Negative for palpitations. Gastrointestinal: Negative for nausea and vomiting. Endocrine: Negative for polydipsia and polyuria. Psychiatric/Behavioral: Negative for agitation and behavioral problems. All other systems reviewed and are negative.       Historical Information  Past Medical History:   Diagnosis Date    COVID-19     Diverticulosis     Dry eye     Hypertension     PONV (postoperative nausea and vomiting)     Seasonal allergies     Thyroid nodule     Von Willebrand disease ([address])      Past Surgical History:   Procedure Laterality Date    ARTHROSCOPIC REPAIR ACL Left     CHOLECYSTECTOMY      COLONOSCOPY      HYSTERECTOMY  2010    partial hysterectomy    IR BIOPSY THYROID      KNEE ARTHROSCOPY Right     LIPOMA RESECTION      back    REDUCTION MAMMAPLASTY Bilateral 2016    THYROID LOBECTOMY Left 6/22/2023    Procedure: HEMITHYROIDECTOMY; LEFT;  Surgeon: Felix Hoff MD;  Location: [facility]; Service: Surgical Oncology    TONSILLECTOMY      US GUIDED LYMPH NODE BIOPSY LEFT  05/26/2023    US GUIDED THYROID BIOPSY  03/10/2023     Social History  Social History     Substance and Sexual Activity   Alcohol Use Yes    Comment: social     Social History     Substance and Sexual Activity   Drug Use Never     Social History     Tobacco Use   Smoking Status Never   Smokeless Tobacco Never     Family History:   Family History   Problem Relation Age of Onset    Hypertension Mother     Hyperlipidemia Mother     Diabetes type II Mother     Breast cancer Mother     Thyroid cancer Mother     Hypertension Father     Heart attack Father     Hyperlipidemia Father     Lung cancer Father     Hypertension Maternal Grandmother     Cancer Maternal Grandmother     No Known Problems Maternal Grandfather     No Known Problems Paternal Grandmother     No Known Problems Paternal Grandfather     Skin cancer Maternal Aunt     No Known Problems Paternal Aunt        Meds/Allergies  Current Outpatient Medications   Medication Sig Dispense Refill    amLODIPine (NORVASC) 10 mg tablet Take 1 tablet (10 mg total) by mouth daily 90 tablet 3    atorvastatin (LIPITOR) 10 mg tablet TAKE 1 TABLET BY MOUTH EVERY DAY 90 tablet 1    cholecalciferol (VITAMIN D3) 1,000 units tablet Take 2 tablets (2,000 Units total) by mouth daily 60 tablet 0    hydrochlorothiazide (HYDRODIURIL) 25 mg tablet Take 1 tablet (25 mg total) by mouth daily 90 tablet 3    lisinopril (ZESTRIL) 10 mg tablet Take 1 tablet (10 mg total) by mouth daily 90 tablet 3    Magnesium 250 MG TABS Take 1 tablet (250 mg total) by mouth daily 90 tablet 3    metoprolol tartrate (LOPRESSOR) 50 mg tablet Take 1 tablet (50 mg total) by mouth 2 (two) times a day 180 tablet 3    semaglutide, 2 mg/dose, (Ozempic, 2 MG/DOSE,) 8 mg/ mL injection pen Inject 0.75 mL (2 mg total) under the skin every 7 days 9 mL 3    mometasone (NASONEX) 50 mcg/act nasal spray  (Patient not taking: Reported on 8/23/2023)       No current facility-administered medications for this visit. Allergies   Allergen Reactions    Pollen Extract Other (See Comments)     Seasonal        Objective  Vitals: Blood pressure 108/64, pulse 82, height 5' 7" (1.702 m), weight 116 kg (256 lb). Physical Exam  Vitals and nursing note reviewed. Constitutional:       Appearance: Normal appearance. Comments: Wears glasses. HENT:      Head: Normocephalic. Mouth/Throat:      Mouth: Mucous membranes are moist.   Eyes:      General: No scleral icterus. Conjunctiva/sclera: Conjunctivae normal.   Neck:      Thyroid: No thyroid mass, thyromegaly or thyroid tenderness. Cardiovascular:      Rate and Rhythm: Normal rate and regular rhythm. Pulmonary:      Effort: Pulmonary effort is normal.      Breath sounds: Normal breath sounds. Skin:     General: Skin is warm and dry. Neurological:      General: No focal deficit present. Mental Status: She is alert. Psychiatric:         Mood and Affect: Mood normal.           The history was obtained from the review of the chart, patient.     Lab Results:   Lab Results   Component Value Date/Time    Hemoglobin A1C 7.1 (H) 08/17/2023 10:04 AM    Hemoglobin A1C 7.9 (H) 04/24/2023 08:21 AM    Hemoglobin A1C 6.8 (H) 11/29/2022 08:42 AM    WBC 8.40 05/02/2023 11:47 AM    Hemoglobin 14.0 05/02/2023 11:47 AM    Hematocrit 43.6 05/02/2023 11:47 AM    MCV 88 05/02/2023 11:47 AM    Platelets 203 40/34/1109 11:47 AM    BUN 8 08/17/2023 10:04 AM    BUN 6 04/24/2023 08:21 AM    BUN 13 11/29/2022 08:42 AM    Potassium 3.9 08/17/2023 10:04 AM    Potassium 3.8 04/24/2023 08:21 AM    Potassium 4.1 11/29/2022 08:42 AM    Chloride 103 08/17/2023 10:04 AM    Chloride 104 04/24/2023 08:21 AM    Chloride 103 11/29/2022 08:42 AM    CO2 29 08/17/2023 10:04 AM    CO2 28 04/24/2023 08:21 AM    CO2 28 11/29/2022 08:42 AM    Creatinine 0.68 08/17/2023 10:04 AM    Creatinine 0.64 04/24/2023 08:21 AM Creatinine 0.82 11/29/2022 08:42 AM    HDL, Direct 37 (L) 08/17/2023 10:04 AM    Triglycerides 350 (H) 08/17/2023 10:04 AM     Lab Results   Component Value Date    LDLCALC 74 08/17/2023       3.10.23      Final Diagnosis   A. & B. Thyroid, Left, Lower Pole: (Thin-Prep and smears)  Follicular neoplasm/Suspicious for follicular neoplasm ([place] Category IV)  - See note. Specimen consists almost exclusively of Hurthle cells. Colloid and mixed inflammatory cells. Satisfactory for evaluation. Note:  (1) As reported in the [facility] for Reporting Thyroid Cytopathology* this diagnostic category has demonstrated anywhere from 25-40% risk of malignancy being found in subsequent resections and/or FNA. This risk of malignancy is expected to change due to the usage of the surgical pathology diagnosis of “non-invasive follicular thyroid neoplasm with papillary-like nuclear features (NIFTP). ”  The anticipated risk of malignancy secondary to NIFTP is 10-40%. The histologic follow-up of cases diagnosed as follicular neoplasm/suspicious for follicular neoplasm includes follicular adenoma, follicular carcinoma, and follicular variant of papillary thyroid carcinoma including the recently described indolent counterpart, NIFTP. The manual reports that the usual management following this diagnosis is genetic testing or lobectomy. Ultimately, clinical/imaging correlation for this patient is needed in arriving at the actual management plan. Imaging Studies:     11/6/2020    THYROID ULTRASOUND     INDICATION:    E04.1: Nontoxic single thyroid nodule. Follow-up nodules       COMPARISON:  4/9/2019 outside study; 7/24/2017; CT neck from 6/5/2020     TECHNIQUE:   Ultrasound of the thyroid was performed with a high frequency linear transducer in transverse and sagittal planes including volumetric imaging sweeps as well as traditional still imaging technique.      FINDINGS:  Mildly heterogeneous gland Right lobe:  4 x 1.6 x 1.7 cm.  5.2 mL  Left lobe:  4.4 x 1.5 x 1.4 cm.  4.5 mL  Isthmus:  0.2 cm. Nodule #1. Image 8. Right midpole measuring 1.1 x 0.7 x 1 cm. Previously 0.9 x 0.5 x 0.6   COMPOSITION:  0 points, spongiform. ECHOGENICITY:  Not applicable when spongiform composition. SHAPE:  0 points, wider-than-tall. MARGIN: 0 points, smooth. ECHOGENIC FOCI:  0 points, none or large comet-tail artifacts. TI-RADS Classification: TR 1 (0 points), Benign. No FNA. Nodule #2. Image 30. Left mid pole measuring 0.9 x 0.7 x 0.7 cm. This is visible on the prior study on volumetric sweeps and appears unchanged in size. This could also simply represent a lobule rather than a true nodule. COMPOSITION:  2 points, solid or almost completely solid . ECHOGENICITY:  1 point, hyperechoic or isoechoic. SHAPE:  0 points, wider-than-tall. MARGIN: 0 points, smooth. ECHOGENIC FOCI:  0 points, none or large comet-tail artifacts. TI-RADS Classification: TR 3 (3 points), FNA if >2.5 cm. Follow if >1.5 cm. Nodule #3. Image 35. Left lower pole measuring 1.0 x 0.9 x 0.7 cm.  0.9 x 0.9 x 0.9 in 2019 (remeasured similarly to the current study). 0.9 x 0.8 x 0.7 cm in 2017. COMPOSITION:  2 points, solid or almost completely solid . ECHOGENICITY:  1 point, hyperechoic or isoechoic. SHAPE:  3 points, taller-than-wide. MARGIN: 0 points, smooth. ECHOGENIC FOCI:  0 points, none or large comet-tail artifacts. TI-RADS Classification: TR 4 (4-6 points), FNA if > 1.5 cm. Follow if > 1cm. IMPRESSION:     TR 4 lesion in the lower pole the left thyroid does not reach size criteria for biopsy and appears not significantly changed in size given differences in measurement technique. Follow-up in one year is advised. Reference: ACR Thyroid Imaging, Reporting and Data System (TI-RADS): White Paper of the [facility].  J AM Russ Radiol 6205;08:109-228. (additional recommendations based on American Thyroid Association 2015 guidelines.)    Portions of the record may have been created with voice recognition software. Occasional wrong word or "sound a like" substitutions may have occurred due to the inherent limitations of voice recognition software. Read the chart carefully and recognize, using context, where substitutions have occurred.

## 2023-08-23 NOTE — PROGRESS NOTES
Mer Carlos 47 y.o. female MRN: [id number]    Encounter: [id number]      Assessment/Plan     1. Type 2 diabetes - improving. Young Vences will continue therapy with ozempic 2 mg weekly. She is motivated to improve dietary habits, and has goal for additional weight loss. We will follow up testing in 3-mo for monitoring, however she may contact me sooner with concerns. 2. Hypertension - improving. Given weight loss, could consider de-escalation of anti-hypertensive regimen. Young Vences will f/u with PCP regarding this. 3. Hyperlipidemia - continue statin therapy    4. Obesity - improving    5. 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 She has a follow up US arranged for ~6 mo. May treat to normal TSH. Problem List Items Addressed This Visit        Endocrine    Type 2 diabetes mellitus without complication, without long-term current use of insulin ([address]) - Primary    Relevant Medications    semaglutide, 2 mg/dose, (Ozempic, 2 MG/DOSE,) 8 mg/ mL injection pen    Other Relevant Orders    Basic metabolic panel Lab Collect    HEMOGLOBIN A1C W/ EAG ESTIMATION Lab Collect    Thyroid cancer ([address])    Relevant Orders    T4, free Lab Collect    TSH, 3rd generation Lab Collect       Cardiovascular and Mediastinum    Essential hypertension       Other    Mixed hyperlipidemia     RTC 4-mo    CC: Diabetes    History of Present Illness     HPI:    Shan Mckeon returns for follow up of diabetes. As always, she is here today with her mother. Young Vencse is sp partial thyroidectomy by Dr. Bernie Ibarra. She is healing well, but notes some symptoms of fatigue. Pathology revealed hurthle cell adenoma with incidental microcarcinoma. Young Vences has not yet had post-operative thyroid studies. For diabetes she is taking ozempic 2 mg weekly. She had prior intolerance to metformin.  Her most recent a1c improved to 7.1% and Tim Crowder has been losing weight steadily. She hopes to have her a1c below 7%. She does acknowledge some difficulty with adherence to diet. Last eye examination June 1st at [address]. For hypertension she takes amlodipine 10 mg daily, HCTZ 25 mg daily, lisinopril 10 mg daily, and metoprolol 50 mg bid. For hyperlipidemia she takes lipitor 10 mg daily. Review of Systems   Constitutional: Negative for diaphoresis and unexpected weight change. HENT: Negative for trouble swallowing and voice change. Cardiovascular: Negative for palpitations. Gastrointestinal: Negative for nausea and vomiting. Endocrine: Negative for polydipsia and polyuria. Psychiatric/Behavioral: Negative for agitation and behavioral problems. All other systems reviewed and are negative.       Historical Information   Past Medical History:   Diagnosis Date   • COVID-19    • Diverticulosis    • Dry eye    • Hypertension    • PONV (postoperative nausea and vomiting)    • Seasonal allergies    • Thyroid nodule    • Von Willebrand disease ([address])      Past Surgical History:   Procedure Laterality Date   • ARTHROSCOPIC REPAIR ACL Left    • CHOLECYSTECTOMY     • COLONOSCOPY     • HYSTERECTOMY  2010    partial hysterectomy   • IR BIOPSY THYROID     • KNEE ARTHROSCOPY Right    • LIPOMA RESECTION      back   • REDUCTION MAMMAPLASTY Bilateral 2016   • THYROID LOBECTOMY Left 6/22/2023    Procedure: HEMITHYROIDECTOMY; LEFT;  Surgeon: Lesly Rascon MD;  Location: [facility];  Service: Surgical Oncology   • TONSILLECTOMY     • US GUIDED LYMPH NODE BIOPSY LEFT  05/26/2023   • US GUIDED THYROID BIOPSY  03/10/2023     Social History   Social History     Substance and Sexual Activity   Alcohol Use Yes    Comment: social     Social History     Substance and Sexual Activity   Drug Use Never     Social History     Tobacco Use   Smoking Status Never   Smokeless Tobacco Never     Family History:   Family History   Problem Relation Age of Onset   • Hypertension Mother    • Hyperlipidemia Mother    • Diabetes type II Mother    • Breast cancer Mother    • Thyroid cancer Mother    • Hypertension Father    • Heart attack Father    • Hyperlipidemia Father    • Lung cancer Father    • Hypertension Maternal Grandmother    • Cancer Maternal Grandmother    • No Known Problems Maternal Grandfather    • No Known Problems Paternal Grandmother    • No Known Problems Paternal Grandfather    • Skin cancer Maternal Aunt    • No Known Problems Paternal Aunt        Meds/Allergies   Current Outpatient Medications   Medication Sig Dispense Refill   • amLODIPine (NORVASC) 10 mg tablet Take 1 tablet (10 mg total) by mouth daily 90 tablet 3   • atorvastatin (LIPITOR) 10 mg tablet TAKE 1 TABLET BY MOUTH EVERY DAY 90 tablet 1   • cholecalciferol (VITAMIN D3) 1,000 units tablet Take 2 tablets (2,000 Units total) by mouth daily 60 tablet 0   • hydrochlorothiazide (HYDRODIURIL) 25 mg tablet Take 1 tablet (25 mg total) by mouth daily 90 tablet 3   • lisinopril (ZESTRIL) 10 mg tablet Take 1 tablet (10 mg total) by mouth daily 90 tablet 3   • Magnesium 250 MG TABS Take 1 tablet (250 mg total) by mouth daily 90 tablet 3   • metoprolol tartrate (LOPRESSOR) 50 mg tablet Take 1 tablet (50 mg total) by mouth 2 (two) times a day 180 tablet 3   • semaglutide, 2 mg/dose, (Ozempic, 2 MG/DOSE,) 8 mg/ mL injection pen Inject 0.75 mL (2 mg total) under the skin every 7 days 9 mL 3   • mometasone (NASONEX) 50 mcg/act nasal spray  (Patient not taking: Reported on 8/23/2023)       No current facility-administered medications for this visit. Allergies   Allergen Reactions   • Pollen Extract Other (See Comments)     Seasonal        Objective   Vitals: Blood pressure 108/64, pulse 82, height 5' 7" (1.702 m), weight 116 kg (256 lb). Physical Exam  Vitals and nursing note reviewed. Constitutional:       Appearance: Normal appearance. Comments: Wears glasses.    HENT:      Head: Normocephalic. Mouth/Throat:      Mouth: Mucous membranes are moist.   Eyes:      General: No scleral icterus. Conjunctiva/sclera: Conjunctivae normal.   Neck:      Thyroid: No thyroid mass, thyromegaly or thyroid tenderness. Cardiovascular:      Rate and Rhythm: Normal rate and regular rhythm. Pulmonary:      Effort: Pulmonary effort is normal.      Breath sounds: Normal breath sounds. Skin:     General: Skin is warm and dry. Neurological:      General: No focal deficit present. Mental Status: She is alert. Psychiatric:         Mood and Affect: Mood normal.           The history was obtained from the review of the chart, patient. Lab Results:   Lab Results   Component Value Date/Time    Hemoglobin A1C 7.1 (H) 08/17/2023 10:04 AM    Hemoglobin A1C 7.9 (H) 04/24/2023 08:21 AM    Hemoglobin A1C 6.8 (H) 11/29/2022 08:42 AM    WBC 8.40 05/02/2023 11:47 AM    Hemoglobin 14.0 05/02/2023 11:47 AM    Hematocrit 43.6 05/02/2023 11:47 AM    MCV 88 05/02/2023 11:47 AM    Platelets 427 57/05/5197 11:47 AM    BUN 8 08/17/2023 10:04 AM    BUN 6 04/24/2023 08:21 AM    BUN 13 11/29/2022 08:42 AM    Potassium 3.9 08/17/2023 10:04 AM    Potassium 3.8 04/24/2023 08:21 AM    Potassium 4.1 11/29/2022 08:42 AM    Chloride 103 08/17/2023 10:04 AM    Chloride 104 04/24/2023 08:21 AM    Chloride 103 11/29/2022 08:42 AM    CO2 29 08/17/2023 10:04 AM    CO2 28 04/24/2023 08:21 AM    CO2 28 11/29/2022 08:42 AM    Creatinine 0.68 08/17/2023 10:04 AM    Creatinine 0.64 04/24/2023 08:21 AM    Creatinine 0.82 11/29/2022 08:42 AM    HDL, Direct 37 (L) 08/17/2023 10:04 AM    Triglycerides 350 (H) 08/17/2023 10:04 AM     Lab Results   Component Value Date    LDLCALC 74 08/17/2023       3.10.23      Final Diagnosis   A. & B. 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   Specimen consists almost exclusively of Hurthle cells. Colloid and mixed inflammatory cells.     Satisfactory for evaluation.     Note:  (1) As reported in the [address] for Reporting Thyroid Cytopathology* this diagnostic category has demonstrated anywhere from 25-40% risk of malignancy being found in subsequent resections and/or FNA. This risk of malignancy is expected to change due to the usage of the surgical pathology diagnosis of “non-invasive follicular thyroid neoplasm with papillary-like nuclear features (NIFTP). ”  The anticipated risk of malignancy secondary to NIFTP is 10-40%. The histologic follow-up of cases diagnosed as follicular neoplasm/suspicious for follicular neoplasm includes follicular adenoma, follicular carcinoma, and follicular variant of papillary thyroid carcinoma including the recently described indolent counterpart, NIFTP. The manual reports that the usual management following this diagnosis is genetic testing or lobectomy. Ultimately, clinical/imaging correlation for this patient is needed in arriving at the actual management plan.            Imaging Studies:     11/6/2020    THYROID ULTRASOUND     INDICATION:    E04.1: Nontoxic single thyroid nodule. Follow-up nodules       COMPARISON:  4/9/2019 outside study; 7/24/2017; CT neck from 6/5/2020     TECHNIQUE:   Ultrasound of the thyroid was performed with a high frequency linear transducer in transverse and sagittal planes including volumetric imaging sweeps as well as traditional still imaging technique.     FINDINGS:  Mildly heterogeneous gland     Right lobe:  4 x 1.6 x 1.7 cm.  5.2 mL  Left lobe:  4.4 x 1.5 x 1.4 cm.  4.5 mL  Isthmus:  0.2 cm.     Nodule #1. Image 8. Right midpole measuring 1.1 x 0.7 x 1 cm. Previously 0.9 x 0.5 x 0.6   COMPOSITION:  0 points, spongiform. ECHOGENICITY:  Not applicable when spongiform composition. SHAPE:  0 points, wider-than-tall. MARGIN: 0 points, smooth.   ECHOGENIC FOCI:  0 points, none or large comet-tail artifacts. TI-RADS Classification: TR 1 (0 points), Benign. No FNA.     Nodule #2. Image 30. Left mid pole measuring 0.9 x 0.7 x 0.7 cm. This is visible on the prior study on volumetric sweeps and appears unchanged in size. This could also simply represent a lobule rather than a true nodule.       COMPOSITION:  2 points, solid or almost completely solid . ECHOGENICITY:  1 point, hyperechoic or isoechoic. SHAPE:  0 points, wider-than-tall. MARGIN: 0 points, smooth. ECHOGENIC FOCI:  0 points, none or large comet-tail artifacts. TI-RADS Classification: TR 3 (3 points), FNA if >2.5 cm. Follow if >1.5 cm.     Nodule #3. Image 35. Left lower pole measuring 1.0 x 0.9 x 0.7 cm.  0.9 x 0.9 x 0.9 in 2019 (remeasured similarly to the current study). 0.9 x 0.8 x 0.7 cm in 2017. COMPOSITION:  2 points, solid or almost completely solid . ECHOGENICITY:  1 point, hyperechoic or isoechoic. SHAPE:  3 points, taller-than-wide. MARGIN: 0 points, smooth. ECHOGENIC FOCI:  0 points, none or large comet-tail artifacts. TI-RADS Classification: TR 4 (4-6 points), FNA if > 1.5 cm. Follow if > 1cm.          IMPRESSION:     TR 4 lesion in the lower pole the left thyroid does not reach size criteria for biopsy and appears not significantly changed in size given differences in measurement technique. Follow-up in one year is advised.     Reference: ACR Thyroid Imaging, Reporting and Data System (TI-RADS): White Paper of the [employer]. J AM Russ Radiol 4091;04:817-275. (additional recommendations based on American Thyroid Association 2015 guidelines.)    Portions of the record may have been created with voice recognition software. Occasional wrong word or "sound a like" substitutions may have occurred due to the inherent limitations of voice recognition software. Read the chart carefully and recognize, using context, where substitutions have occurred.

## 2023-08-24 ENCOUNTER — TELEPHONE (OUTPATIENT)
Dept: ADMINISTRATIVE | Facility: OTHER | Age: 53
End: 2023-08-24

## 2023-08-24 ENCOUNTER — TELEPHONE (OUTPATIENT)
Dept: ENDOCRINOLOGY | Facility: CLINIC | Age: 53
End: 2023-08-24

## 2023-08-24 NOTE — LETTER
Diabetic Eye Exam Form    Date Requested: 23  Patient: Arben Hill  Patient : 1970   Referring Provider: Tiburcio Haines DO (Inactive)      DIABETIC Eye Exam Date _______________________________      Type of Exam MUST be documented for Diabetic Eye Exams. Please CHECK ONE. Retinal Exam       Dilated Retinal Exam       OCT       Optomap-Iris Exam      Fundus Photography       Left Eye - Please check Retinopathy or No Retinopathy        Exam did show retinopathy    Exam did not show retinopathy       Right Eye - Please check Retinopathy or No Retinopathy       Exam did show retinopathy    Exam did not show retinopathy       Comments __________________________________________________________    Practice Providing Exam ______________________________________________    Exam Performed By (print name) _______________________________________      Provider Signature ___________________________________________________      These reports are needed for  compliance. Please fax this completed form and a copy of the Diabetic Eye Exam report to our office located at [address] [place] [address] as soon as possible via Fax [phone] attention Eleanor: Phone [phone]  We thank you for your assistance in treating our mutual patient.

## 2023-08-24 NOTE — LETTER
Diabetic Eye Exam Form /2nd Request  Fax back to [address] [facility] @ [phone]    Date Requested: 09/15/23  Patient: Elida Patrick  Patient : 1970   Referring Provider: Cheryle Alexander DO (Inactive)      DIABETIC Eye Exam Date _______________________________      Type of Exam MUST be documented for Diabetic Eye Exams. Please CHECK ONE. Retinal Exam       Dilated Retinal Exam       OCT       Optomap-Iris Exam      Fundus Photography       Left Eye - Please check Retinopathy or No Retinopathy        Exam did show retinopathy    Exam did not show retinopathy       Right Eye - Please check Retinopathy or No Retinopathy       Exam did show retinopathy    Exam did not show retinopathy       Comments __________________________________________________________    Practice Providing Exam ______________________________________________    Exam Performed By (print name) _______________________________________      Provider Signature ___________________________________________________      These reports are needed for  compliance. Please fax this completed form and a copy of the Diabetic Eye Exam report to our office located at [address] [place] [address] as soon as possible via fax [phone]  [facility] [place]  We thank you for your assistance in treating our mutual patient.

## 2023-08-24 NOTE — TELEPHONE ENCOUNTER
----- Message from Rajesh Singh sent at 8/24/2023  7:22 AM EDT -----  Regarding: FW: DM eye care gap    ----- Message -----  From: Bi Cheek DO  Sent: 8/23/2023   4:54 PM EDT  To: #  Subject: DM eye care gap                                  Can provider office below be contacted for updated dm eye care gap?     [place] [address] [address], [address] [place] [address]  WIXK[facility]: [phone]

## 2023-08-30 NOTE — TELEPHONE ENCOUNTER
Upon review of the In Basket request and the patient's chart, initial outreach has been made via telephone call to facility. Please see Contacts section for details.      Thank you  Marleni Garcia

## 2023-09-05 ENCOUNTER — APPOINTMENT (OUTPATIENT)
Dept: LAB | Facility: HOSPITAL | Age: 53
End: 2023-09-05
Payer: COMMERCIAL

## 2023-09-05 DIAGNOSIS — C73 THYROID CANCER (HCC): ICD-10-CM

## 2023-09-05 LAB
T4 FREE SERPL-MCNC: 0.82 NG/DL (ref 0.61–1.12)
TSH SERPL DL<=0.05 MIU/L-ACNC: 2.24 UIU/ML (ref 0.45–4.5)

## 2023-09-05 PROCEDURE — 84443 ASSAY THYROID STIM HORMONE: CPT

## 2023-09-05 PROCEDURE — 36415 COLL VENOUS BLD VENIPUNCTURE: CPT

## 2023-09-05 PROCEDURE — 84439 ASSAY OF FREE THYROXINE: CPT

## 2023-09-15 NOTE — TELEPHONE ENCOUNTER
As a follow-up, a second attempt has been made for outreach via fax to facility. Please see Contacts section for details.     Thank you  Yudith Dates

## 2023-09-19 NOTE — TELEPHONE ENCOUNTER
As a final attempt, a third outreach has been made via fax to facility. Please see Contacts section for details. This encounter will be closed and completed by end of day. Should we receive the requested information because of previous outreach attempts, the requested patient's chart will be updated appropriately.      Thank you  Charles Pereira

## 2023-11-01 DIAGNOSIS — E11.9 TYPE 2 DIABETES MELLITUS WITHOUT COMPLICATION, WITHOUT LONG-TERM CURRENT USE OF INSULIN (HCC): ICD-10-CM

## 2023-11-01 DIAGNOSIS — E11.9 NON-INSULIN DEPENDENT TYPE 2 DIABETES MELLITUS (HCC): ICD-10-CM

## 2023-11-01 RX ORDER — ATORVASTATIN CALCIUM 10 MG/1
TABLET, FILM COATED ORAL
Qty: 90 TABLET | Refills: 1 | Status: SHIPPED | OUTPATIENT
Start: 2023-11-01

## 2023-11-02 DIAGNOSIS — I10 ESSENTIAL HYPERTENSION: ICD-10-CM

## 2023-11-02 RX ORDER — AMLODIPINE BESYLATE 10 MG/1
10 TABLET ORAL DAILY
Qty: 90 TABLET | Refills: 0 | Status: SHIPPED | OUTPATIENT
Start: 2023-11-02

## 2023-11-02 RX ORDER — HYDROCHLOROTHIAZIDE 25 MG/1
25 TABLET ORAL DAILY
Qty: 90 TABLET | Refills: 0 | Status: SHIPPED | OUTPATIENT
Start: 2023-11-02

## 2023-11-02 RX ORDER — LISINOPRIL 10 MG/1
10 TABLET ORAL DAILY
Qty: 90 TABLET | Refills: 0 | Status: SHIPPED | OUTPATIENT
Start: 2023-11-02

## 2023-11-25 PROCEDURE — 93000 ELECTROCARDIOGRAM COMPLETE: CPT | Performed by: INTERNAL MEDICINE

## 2023-12-22 ENCOUNTER — APPOINTMENT (OUTPATIENT)
Dept: LAB | Facility: HOSPITAL | Age: 53
End: 2023-12-22
Payer: COMMERCIAL

## 2023-12-22 DIAGNOSIS — E11.9 TYPE 2 DIABETES MELLITUS WITHOUT COMPLICATION, WITHOUT LONG-TERM CURRENT USE OF INSULIN (HCC): ICD-10-CM

## 2023-12-22 DIAGNOSIS — E11.9 TYPE 2 DIABETES MELLITUS WITHOUT COMPLICATION, WITHOUT LONG-TERM CURRENT USE OF INSULIN (HCC): Primary | ICD-10-CM

## 2023-12-22 LAB
ANION GAP SERPL CALCULATED.3IONS-SCNC: 5 MMOL/L
BUN SERPL-MCNC: 9 MG/DL (ref 5–25)
CALCIUM SERPL-MCNC: 8.8 MG/DL (ref 8.4–10.2)
CHLORIDE SERPL-SCNC: 104 MMOL/L (ref 96–108)
CO2 SERPL-SCNC: 29 MMOL/L (ref 21–32)
CREAT SERPL-MCNC: 0.66 MG/DL (ref 0.6–1.3)
EST. AVERAGE GLUCOSE BLD GHB EST-MCNC: 151 MG/DL
GFR SERPL CREATININE-BSD FRML MDRD: 101 ML/MIN/1.73SQ M
GLUCOSE P FAST SERPL-MCNC: 143 MG/DL (ref 65–99)
HBA1C MFR BLD: 6.9 %
POTASSIUM SERPL-SCNC: 4.1 MMOL/L (ref 3.5–5.3)
SODIUM SERPL-SCNC: 138 MMOL/L (ref 135–147)

## 2023-12-22 PROCEDURE — 80048 BASIC METABOLIC PNL TOTAL CA: CPT

## 2023-12-22 PROCEDURE — 36415 COLL VENOUS BLD VENIPUNCTURE: CPT

## 2023-12-22 PROCEDURE — 83036 HEMOGLOBIN GLYCOSYLATED A1C: CPT

## 2023-12-28 ENCOUNTER — TELEPHONE (OUTPATIENT)
Dept: ENDOCRINOLOGY | Facility: CLINIC | Age: 53
End: 2023-12-28

## 2024-01-03 ENCOUNTER — OFFICE VISIT (OUTPATIENT)
Dept: ENDOCRINOLOGY | Facility: CLINIC | Age: 54
End: 2024-01-03
Payer: COMMERCIAL

## 2024-01-03 VITALS
WEIGHT: 255 LBS | HEART RATE: 86 BPM | SYSTOLIC BLOOD PRESSURE: 124 MMHG | BODY MASS INDEX: 40.02 KG/M2 | HEIGHT: 67 IN | DIASTOLIC BLOOD PRESSURE: 88 MMHG | OXYGEN SATURATION: 97 %

## 2024-01-03 DIAGNOSIS — E78.2 MIXED HYPERLIPIDEMIA: ICD-10-CM

## 2024-01-03 DIAGNOSIS — E11.9 TYPE 2 DIABETES MELLITUS WITHOUT COMPLICATION, WITHOUT LONG-TERM CURRENT USE OF INSULIN (HCC): Primary | ICD-10-CM

## 2024-01-03 DIAGNOSIS — E66.01 CLASS 2 SEVERE OBESITY DUE TO EXCESS CALORIES WITH SERIOUS COMORBIDITY AND BODY MASS INDEX (BMI) OF 39.0 TO 39.9 IN ADULT: ICD-10-CM

## 2024-01-03 DIAGNOSIS — Z98.890 HISTORY OF THYROID SURGERY: ICD-10-CM

## 2024-01-03 DIAGNOSIS — I10 ESSENTIAL HYPERTENSION: ICD-10-CM

## 2024-01-03 PROCEDURE — 99214 OFFICE O/P EST MOD 30 MIN: CPT | Performed by: STUDENT IN AN ORGANIZED HEALTH CARE EDUCATION/TRAINING PROGRAM

## 2024-01-03 NOTE — PROGRESS NOTES
Chary Schmidt 53 y.o. female MRN: [id number]    Encounter: [id number]      Assessment/Plan     1. Type 2 diabetes - stable. Would like to see A1c <6.5%. I believe Chary could benefit from class substitution of ozempic for mounjaro, which is more effective at A1c lowering and weight loss. I would like to initiate at 5 mg weekly dosing, and titrate up to 15 mg according to need and tolerability. If plan to start mounjaro not supported, we discussed consideration for low dose extended release metformin.     2. Hypertension - controlled. No changes to current Rx    3. Hyperlipidemia - continue statin therapy    4. Obesity - stable.     5. 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 TFTs presently at goal, without therapy. Follow up thyroid US prior to next visit. If stable, will likely defer additional surveillance imaging.     6. Hot flashes - suspect menopause. Has history of hysterectomy. Conservative management recommended. Patient planning to establish care with ob/gyn in future.     Problem List Items Addressed This Visit     Essential hypertension    Type 2 diabetes mellitus without complication, without long-term current use of insulin (HCC) - Primary    Relevant Medications    tirzepatide (Mounjaro) 5 MG/0.5ML    Other Relevant Orders    Follicle stimulating hormone    Estradiol    Basic metabolic panel    Hemoglobin A1C    Lipid Panel with Direct LDL reflex    Mixed hyperlipidemia   Other Visit Diagnoses     Class 2 severe obesity due to excess calories with serious comorbidity and body mass index (BMI) of 39.0 to 39.9 in adult         History of thyroid surgery        Relevant Orders    US thyroid        RTC 6-mo    CC: Diabetes    History of Present Illness     HPI:    Freda returns for follow up of diabetes. As always, she is here today with her mother.     Chary is disappointed with A1c and weight. She reports challenges with impulse control with  re: to foods. She is also experiencing night sweats and hot flashes. She does have history of hysterectomy. Presently, Chary is on monotherapy with ozempic 2 mg weekly. She has prior intolerance to metformin IR, causing GI upset. She reports CBG testing at home intermittently, values typically ~140.     Chary is sp partial thyroidectomy by Dr. Oro. Pathology revealed hurthle cell adenoma with incidental microcarcinoma. Chary does not take thyroid hormone replacement therapy.      For hypertension she takes amlodipine 10 mg daily, HCTZ 25 mg daily, lisinopril 10 mg daily, and metoprolol 50 mg bid. For hyperlipidemia she takes lipitor 10 mg daily.     Review of Systems   Constitutional:  Negative for diaphoresis and unexpected weight change.   HENT:  Negative for trouble swallowing and voice change.    Cardiovascular:  Negative for palpitations.   Gastrointestinal:  Negative for nausea and vomiting.   Endocrine: Negative for polydipsia and polyuria.   Psychiatric/Behavioral:  Negative for agitation and behavioral problems.    All other systems reviewed and are negative.      Historical Information   Past Medical History:   Diagnosis Date   • COVID-19    • Diverticulosis    • Dry eye    • Hypertension    • PONV (postoperative nausea and vomiting)    • Seasonal allergies    • Thyroid nodule    • Von Willebrand disease (HCC)      Past Surgical History:   Procedure Laterality Date   • ARTHROSCOPIC REPAIR ACL Left    • CHOLECYSTECTOMY     • COLONOSCOPY     • HYSTERECTOMY  2010    partial hysterectomy   • IR BIOPSY THYROID     • KNEE ARTHROSCOPY Right    • LIPOMA RESECTION      back   • REDUCTION MAMMAPLASTY Bilateral 2016   • THYROID LOBECTOMY Left 6/22/2023    Procedure: HEMITHYROIDECTOMY; LEFT;  Surgeon: Sana Oro MD;  Location: [facility];  Service: Surgical Oncology   • TONSILLECTOMY     • US GUIDED LYMPH NODE BIOPSY LEFT  05/26/2023   • US GUIDED THYROID BIOPSY  03/10/2023     Social History   Social  History     Substance and Sexual Activity   Alcohol Use Yes    Comment: social     Social History     Substance and Sexual Activity   Drug Use Never     Social History     Tobacco Use   Smoking Status Never   Smokeless Tobacco Never     Family History:   Family History   Problem Relation Age of Onset   • Hypertension Mother    • Hyperlipidemia Mother    • Diabetes type II Mother    • Breast cancer Mother    • Thyroid cancer Mother    • Hypertension Father    • Heart attack Father    • Hyperlipidemia Father    • Lung cancer Father    • Hypertension Maternal Grandmother    • Cancer Maternal Grandmother    • No Known Problems Maternal Grandfather    • No Known Problems Paternal Grandmother    • No Known Problems Paternal Grandfather    • Skin cancer Maternal Aunt    • No Known Problems Paternal Aunt        Meds/Allergies   Current Outpatient Medications   Medication Sig Dispense Refill   • amLODIPine (NORVASC) 10 mg tablet Take 1 tablet (10 mg total) by mouth daily 90 tablet 0   • atorvastatin (LIPITOR) 10 mg tablet TAKE 1 TABLET BY MOUTH EVERY DAY 90 tablet 1   • cholecalciferol (VITAMIN D3) 1,000 units tablet Take 2 tablets (2,000 Units total) by mouth daily 60 tablet 0   • hydrochlorothiazide (HYDRODIURIL) 25 mg tablet Take 1 tablet (25 mg total) by mouth daily 90 tablet 0   • lisinopril (ZESTRIL) 10 mg tablet Take 1 tablet (10 mg total) by mouth daily 90 tablet 0   • Magnesium 250 MG TABS Take 1 tablet (250 mg total) by mouth daily 90 tablet 3   • metoprolol tartrate (LOPRESSOR) 50 mg tablet TAKE 1 TABLET BY MOUTH TWICE A  tablet 3   • tirzepatide (Mounjaro) 5 MG/0.5ML Inject 0.5 mL (5 mg total) under the skin every 7 days 2 mL 3   • mometasone (NASONEX) 50 mcg/act nasal spray  (Patient not taking: Reported on 8/23/2023)       No current facility-administered medications for this visit.     Allergies   Allergen Reactions   • Pollen Extract Other (See Comments)     Seasonal        Objective   Vitals: Blood  "pressure 124/88, pulse 86, height 5' 7\" (1.702 m), weight 116 kg (255 lb), SpO2 97%.    Physical Exam  Vitals and nursing note reviewed.   Constitutional:       Appearance: Normal appearance.      Comments: Wears glasses.   HENT:      Head: Normocephalic.      Mouth/Throat:      Mouth: Mucous membranes are moist.   Eyes:      General: No scleral icterus.     Conjunctiva/sclera: Conjunctivae normal.   Neck:      Thyroid: No thyroid mass, thyromegaly or thyroid tenderness.   Cardiovascular:      Rate and Rhythm: Normal rate and regular rhythm.      Pulses: no weak pulses          Dorsalis pedis pulses are 2+ on the right side and 2+ on the left side.   Pulmonary:      Effort: Pulmonary effort is normal.      Breath sounds: Normal breath sounds.   Feet:      Right foot:      Skin integrity: No ulcer, skin breakdown, erythema, warmth, callus or dry skin.      Left foot:      Skin integrity: No ulcer, skin breakdown, erythema, warmth, callus or dry skin.   Skin:     General: Skin is warm and dry.   Neurological:      General: No focal deficit present.      Mental Status: She is alert.   Psychiatric:         Mood and Affect: Mood normal.     Diabetic Foot Exam    Patient's shoes and socks removed.    Right Foot/Ankle   Right Foot Inspection  Skin Exam: skin normal and skin intact. No dry skin, no warmth, no callus, no erythema, no maceration, no abnormal color, no pre-ulcer, no ulcer and no callus.     Sensory   Vibration: intact  Monofilament testing: intact    Vascular  The right DP pulse is 2+.     Left Foot/Ankle  Left Foot Inspection  Skin Exam: skin normal and skin intact. No dry skin, no warmth, no erythema, no maceration, normal color, no pre-ulcer, no ulcer and no callus.     Sensory   Vibration: intact  Monofilament testing: intact    Vascular  The left DP pulse is 2+.     Assign Risk Category  No deformity present  No loss of protective sensation  No weak pulses  Risk: 0        The history was obtained from the " review of the chart, patient.    Lab Results:   Lab Results   Component Value Date/Time    Hemoglobin A1C 6.9 (H) 12/22/2023 10:04 AM    Hemoglobin A1C 7.1 (H) 08/17/2023 10:04 AM    Hemoglobin A1C 7.9 (H) 04/24/2023 08:21 AM    WBC 8.40 05/02/2023 11:47 AM    Hemoglobin 14.0 05/02/2023 11:47 AM    Hematocrit 43.6 05/02/2023 11:47 AM    MCV 88 05/02/2023 11:47 AM    Platelets 368 05/02/2023 11:47 AM    BUN 9 12/22/2023 10:04 AM    BUN 8 08/17/2023 10:04 AM    BUN 6 04/24/2023 08:21 AM    Potassium 4.1 12/22/2023 10:04 AM    Potassium 3.9 08/17/2023 10:04 AM    Potassium 3.8 04/24/2023 08:21 AM    Chloride 104 12/22/2023 10:04 AM    Chloride 103 08/17/2023 10:04 AM    Chloride 104 04/24/2023 08:21 AM    CO2 29 12/22/2023 10:04 AM    CO2 29 08/17/2023 10:04 AM    CO2 28 04/24/2023 08:21 AM    Creatinine 0.66 12/22/2023 10:04 AM    Creatinine 0.68 08/17/2023 10:04 AM    Creatinine 0.64 04/24/2023 08:21 AM    HDL, Direct 37 (L) 08/17/2023 10:04 AM    Triglycerides 350 (H) 08/17/2023 10:04 AM     Lab Results   Component Value Date    LDLCALC 74 08/17/2023       3.10.23      Final Diagnosis   A. & B.  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  Specimen consists almost exclusively of Hurthle cells.  Colloid and mixed inflammatory cells.     Satisfactory for evaluation.     Note:  (1) As reported in the Melcroft System for Reporting Thyroid Cytopathology* this diagnostic category has demonstrated anywhere from 25-40% risk of malignancy being found in subsequent resections and/or FNA.  This risk of malignancy is expected to change due to the usage of the surgical pathology diagnosis of “non-invasive follicular thyroid neoplasm with papillary-like nuclear features (NIFTP).”  The anticipated risk of malignancy secondary to NIFTP is 10-40%.   The histologic follow-up of cases diagnosed as follicular neoplasm/suspicious for  follicular neoplasm includes follicular adenoma, follicular carcinoma, and follicular variant of papillary thyroid carcinoma including the recently described indolent counterpart, NIFTP.  The manual reports that the usual management following this diagnosis is genetic testing or lobectomy. Ultimately, clinical/imaging correlation for this patient is needed in arriving at the actual management plan.            Imaging Studies:     11/6/2020    THYROID ULTRASOUND     INDICATION:    E04.1: Nontoxic single thyroid nodule.    Follow-up nodules       COMPARISON:  4/9/2019 outside study; 7/24/2017; CT neck from 6/5/2020     TECHNIQUE:   Ultrasound of the thyroid was performed with a high frequency linear transducer in transverse and sagittal planes including volumetric imaging sweeps as well as traditional still imaging technique.     FINDINGS:  Mildly heterogeneous gland     Right lobe:  4 x 1.6 x 1.7 cm.  5.2 mL  Left lobe:  4.4 x 1.5 x 1.4 cm.  4.5 mL  Isthmus:  0.2 cm.     Nodule #1.  Image 8.  Right midpole measuring 1.1 x 0.7 x 1 cm.  Previously 0.9 x 0.5 x 0.6   COMPOSITION:  0 points, spongiform.  ECHOGENICITY:  Not applicable when spongiform composition.    SHAPE:  0 points, wider-than-tall.    MARGIN: 0 points, smooth.  ECHOGENIC FOCI:  0 points, none or large comet-tail artifacts.  TI-RADS Classification: TR 1 (0 points), Benign. No FNA.     Nodule #2.  Image 30.  Left mid pole measuring 0.9 x 0.7 x 0.7 cm.  This is visible on the prior study on volumetric sweeps and appears unchanged in size.  This could also simply represent a lobule rather than a true nodule.       COMPOSITION:  2 points, solid or almost completely solid .  ECHOGENICITY:  1 point, hyperechoic or isoechoic.    SHAPE:  0 points, wider-than-tall.    MARGIN: 0 points, smooth.  ECHOGENIC FOCI:  0 points, none or large comet-tail artifacts.  TI-RADS Classification: TR 3 (3 points), FNA if >2.5 cm.  Follow if >1.5 cm.     Nodule #3.  Image 35.  Left  "lower pole measuring 1.0 x 0.9 x 0.7 cm.  0.9 x 0.9 x 0.9 in 2019 (remeasured similarly to the current study).  0.9 x 0.8 x 0.7 cm in 2017.   COMPOSITION:  2 points, solid or almost completely solid .  ECHOGENICITY:  1 point, hyperechoic or isoechoic.    SHAPE:  3 points, taller-than-wide.    MARGIN: 0 points, smooth.  ECHOGENIC FOCI:  0 points, none or large comet-tail artifacts.  TI-RADS Classification: TR 4 (4-6 points), FNA if > 1.5 cm. Follow if > 1cm.           IMPRESSION:     TR 4 lesion in the lower pole the left thyroid does not reach size criteria for biopsy and appears not significantly changed in size given differences in measurement technique.  Follow-up in one year is advised.     Reference: ACR Thyroid Imaging, Reporting and Data System (TI-RADS): White Paper of the ACR TI-RADS Committee. J AM Russ Radiol 2017;14:587-595. (additional recommendations based on American Thyroid Association 2015 guidelines.)    Portions of the record may have been created with voice recognition software. Occasional wrong word or \"sound a like\" substitutions may have occurred due to the inherent limitations of voice recognition software. Read the chart carefully and recognize, using context, where substitutions have occurred.  "

## 2024-01-05 PROBLEM — Z85.850 PERSONAL HISTORY OF MALIGNANT NEOPLASM OF THYROID: Status: ACTIVE | Noted: 2023-07-05

## 2024-01-05 PROBLEM — Z08 ENCOUNTER FOR FOLLOW-UP SURVEILLANCE OF THYROID CANCER: Status: ACTIVE | Noted: 2024-01-05

## 2024-01-05 PROBLEM — Z85.850 ENCOUNTER FOR FOLLOW-UP SURVEILLANCE OF THYROID CANCER: Status: ACTIVE | Noted: 2024-01-05

## 2024-01-10 ENCOUNTER — APPOINTMENT (OUTPATIENT)
Dept: LAB | Facility: HOSPITAL | Age: 54
End: 2024-01-10
Payer: COMMERCIAL

## 2024-01-10 DIAGNOSIS — E11.9 TYPE 2 DIABETES MELLITUS WITHOUT COMPLICATION, WITHOUT LONG-TERM CURRENT USE OF INSULIN (HCC): ICD-10-CM

## 2024-01-10 LAB
ESTRADIOL SERPL-MCNC: 19.4 PG/ML
FSH SERPL-ACNC: 49.2 MIU/ML

## 2024-01-10 PROCEDURE — 82670 ASSAY OF TOTAL ESTRADIOL: CPT

## 2024-01-10 PROCEDURE — 83001 ASSAY OF GONADOTROPIN (FSH): CPT

## 2024-01-10 PROCEDURE — 36415 COLL VENOUS BLD VENIPUNCTURE: CPT

## 2024-02-02 ENCOUNTER — TELEPHONE (OUTPATIENT)
Dept: FAMILY MEDICINE CLINIC | Facility: CLINIC | Age: 54
End: 2024-02-02

## 2024-02-02 NOTE — LETTER
Chary Schmidt  [address] [place] [address]    2/2/24      Chary,    Thank you for choosing [facility] in [place] for your health care services. We noticed in our records that you do not have a primary care physician listed. We are reaching out to see if you would be interested in establishing care with any of highly skilled family doctors. There are multiple office locations around [facility] for your convenience.      Our nearby offices are located at:         [address] [facility]. Phone [phone] [address] [place] [address]. Phone [phone] [address] [facility]. Phone [phone]           [address] [facility]. Phone [phone]      We look forward to hearing from you and again thank you for choosing [facility] for your care.

## 2024-02-18 DIAGNOSIS — I10 ESSENTIAL HYPERTENSION: ICD-10-CM

## 2024-02-19 RX ORDER — HYDROCHLOROTHIAZIDE 25 MG/1
25 TABLET ORAL DAILY
Qty: 90 TABLET | Refills: 3 | Status: SHIPPED | OUTPATIENT
Start: 2024-02-19

## 2024-02-19 RX ORDER — METOPROLOL TARTRATE 50 MG/1
50 TABLET, FILM COATED ORAL 2 TIMES DAILY
Qty: 180 TABLET | Refills: 0 | OUTPATIENT
Start: 2024-02-19

## 2024-03-15 DIAGNOSIS — I10 ESSENTIAL HYPERTENSION: ICD-10-CM

## 2024-03-18 RX ORDER — AMLODIPINE BESYLATE 10 MG/1
10 TABLET ORAL DAILY
Qty: 90 TABLET | Refills: 0 | Status: SHIPPED | OUTPATIENT
Start: 2024-03-18

## 2024-03-18 RX ORDER — LISINOPRIL 10 MG/1
10 TABLET ORAL DAILY
Qty: 90 TABLET | Refills: 0 | Status: SHIPPED | OUTPATIENT
Start: 2024-03-18

## 2024-05-16 ENCOUNTER — OFFICE VISIT (OUTPATIENT)
Dept: CARDIOLOGY CLINIC | Facility: CLINIC | Age: 54
End: 2024-05-16
Payer: COMMERCIAL

## 2024-05-16 VITALS
WEIGHT: 260 LBS | TEMPERATURE: 98.7 F | BODY MASS INDEX: 40.81 KG/M2 | HEART RATE: 68 BPM | HEIGHT: 67 IN | OXYGEN SATURATION: 98 % | SYSTOLIC BLOOD PRESSURE: 134 MMHG | DIASTOLIC BLOOD PRESSURE: 78 MMHG

## 2024-05-16 DIAGNOSIS — E11.9 TYPE 2 DIABETES MELLITUS WITHOUT COMPLICATION, WITHOUT LONG-TERM CURRENT USE OF INSULIN (HCC): ICD-10-CM

## 2024-05-16 DIAGNOSIS — E11.9 NON-INSULIN DEPENDENT TYPE 2 DIABETES MELLITUS (HCC): ICD-10-CM

## 2024-05-16 DIAGNOSIS — I10 ESSENTIAL HYPERTENSION: Primary | ICD-10-CM

## 2024-05-16 DIAGNOSIS — E78.2 MIXED HYPERLIPIDEMIA: ICD-10-CM

## 2024-05-16 DIAGNOSIS — E66.01 MORBID OBESITY (HCC): ICD-10-CM

## 2024-05-16 PROBLEM — Z01.810 PREOPERATIVE CARDIOVASCULAR EXAMINATION: Status: RESOLVED | Noted: 2023-05-05 | Resolved: 2024-05-16

## 2024-05-16 PROCEDURE — 99214 OFFICE O/P EST MOD 30 MIN: CPT | Performed by: NURSE PRACTITIONER

## 2024-05-16 RX ORDER — METOPROLOL TARTRATE 50 MG/1
50 TABLET, FILM COATED ORAL 2 TIMES DAILY
Qty: 180 TABLET | Refills: 3 | Status: SHIPPED | OUTPATIENT
Start: 2024-05-16

## 2024-05-16 RX ORDER — ATORVASTATIN CALCIUM 10 MG/1
10 TABLET, FILM COATED ORAL DAILY
Qty: 90 TABLET | Refills: 3 | Status: SHIPPED | OUTPATIENT
Start: 2024-05-16

## 2024-05-16 RX ORDER — ATORVASTATIN CALCIUM 10 MG/1
TABLET, FILM COATED ORAL
Qty: 90 TABLET | Refills: 1 | OUTPATIENT
Start: 2024-05-16

## 2024-05-16 RX ORDER — HYDROCHLOROTHIAZIDE 25 MG/1
25 TABLET ORAL DAILY
Qty: 90 TABLET | Refills: 3 | Status: SHIPPED | OUTPATIENT
Start: 2024-05-16

## 2024-05-16 RX ORDER — AMLODIPINE BESYLATE 10 MG/1
10 TABLET ORAL DAILY
Qty: 90 TABLET | Refills: 3 | Status: SHIPPED | OUTPATIENT
Start: 2024-05-16

## 2024-05-16 RX ORDER — LISINOPRIL 10 MG/1
10 TABLET ORAL DAILY
Qty: 90 TABLET | Refills: 3 | Status: SHIPPED | OUTPATIENT
Start: 2024-05-16

## 2024-05-16 NOTE — ASSESSMENT & PLAN NOTE
Body mass index is 40.72 kg/m².  Reviewed benefits of weight reduction, dietary/exercise modifications.

## 2024-05-16 NOTE — PATIENT INSTRUCTIONS
BP was good today but monitor at home and if consistently < 110/70 we can reduce your amlodipine.  Bring your home cuff to your appt with Dr. Lucas in July so they can check the accuracy.   Think about a sleep study if fatigue continues. I would try to get a solid 6 hours per day if possible.  Hydrate well.

## 2024-05-16 NOTE — PROGRESS NOTES
Cardiology Follow Up    Chary Schmidt  1970  [id number]  [facility] [place] [address]  [address]  [facility] [address] [phone]    Chary presents for follow up for HTN, HLD.    1. Essential hypertension  Assessment & Plan:  Patient with longstanding hypertension since age 21.  Blood pressure is in excellent control.  Currently on amlodipine 10mg daily, HCTZ 25 mg daily, lisinopril 10mg, and metoprolol tartrate 50mg BID.  Recent lab work reviewed.  Continue with 2g sodium diet, exercise, weight reduction.  May adjust medication as needed if BP continues to decrease.  Orders:  -     amLODIPine (NORVASC) 10 mg tablet; Take 1 tablet (10 mg total) by mouth daily  -     hydroCHLOROthiazide 25 mg tablet; Take 1 tablet (25 mg total) by mouth daily  -     lisinopril (ZESTRIL) 10 mg tablet; Take 1 tablet (10 mg total) by mouth daily  -     metoprolol tartrate (LOPRESSOR) 50 mg tablet; Take 1 tablet (50 mg total) by mouth 2 (two) times a day  2. Mixed hyperlipidemia  Assessment & Plan:  Pt has metabolic syndrome with T2DM.   Goal LDL < 70  Now on atorvastatin 10 mg daily. Previously on fenofibrate.  Lipid panel 8/17/2023: C 181. T 350. H 37. L 74.  Awaiting updated labs through endocrinology this month.  Consider resuming fenofibrate if TG > 200.  Orders:  -     atorvastatin (LIPITOR) 10 mg tablet; Take 1 tablet (10 mg total) by mouth daily  3. Type 2 diabetes mellitus without complication, without long-term current use of insulin (HCC)  Assessment & Plan:    Lab Results   Component Value Date    HGBA1C 6.9 (H) 12/22/2023     Managed by [facility] endocrinology. Excellent control.  Reviewed cardiovascular risk associated with diabetes. Goal LDL < 70.  Remains on atorvastatin 10 mg daily.  4. Morbid obesity (HCC)  Assessment & Plan:  Body mass index is 40.72 kg/m².  Reviewed benefits of weight reduction, dietary/exercise modifications.     PATO   Chary has a  past medical history of HTN, HLD, T2DM, follicular thyroid cancer s/p aida-thyroidectomy,Von Willebrand disease, Covid 19 pneumonia, and GERD.     She previously followed with Dr. De Los Santos at [facility] who has since left the practice.      She started treatment for HTN in the year 2000 (in her late 20's).  Echocardiogram 05/2019 showed normal LV function with an EF of 60% and grade 2 diastolic dysfunction.  She has a family history of premature coronary artery disease in her father who had an MI in his 50s.     She met in consultation with Dr. Magana on 1/26/2021. She was diagnosed with Covid pneumonia in December 2020 and hospitalized in [facility] ICU from 12/29/20-1/2/2021. Her HCTZ was stopped during her admission, suspected to be secondary to hypokalemia during admission. She was sent home with supplemental O2. Her potassium improved and HCTZ was resumed following that initial visit.    She followed up most recently with Dr. Magana on 5/5/2023 at which time she required pre-operative cardiac evaluation in preparation for thyroid surgery by Dr. Oro. ECG done pre-op showed possible anteroseptal MI, which was felt to be due to lead placement. Repeat ECG in office showed NSR. She was without cardiac complaints. No changes were made to her regimen.    5/16/2024: Chary presents for routine follow up. She underwent removal of her left thyroid lobe on 6/22/2023. She has recovered well from the operation and is following with [facility] oncologist Dr. Oro and endocrinologist Dr. Lucas. She states she is doing very well. 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 Only complaint is some fatigue. She does not think she snores and declines a sleep study. BP was low at an appointment with Dr. Lucas in August. She does not check at home. No chest pain, shortness of breath, edema. No lightheadedness, dizziness or near syncope/syncope. She is taking her medications faithfully. Lab work through  endocrinology 8/17/2023 showed . LDL 74. She has orders for updated labs in July.     Medical Problems       Problem List       Essential hypertension    Hypertriglyceridemia    Morbid obesity (HCC)    Type 2 diabetes mellitus without complication, without long-term current use of insulin (HCC)    Von Willebrand disease (HCC)    Acid reflux    Multiple thyroid nodules    Pulmonary nodule    Mixed hyperlipidemia    Thyroid nodule    Preoperative cardiovascular examination    Personal history of malignant neoplasm of thyroid    Encounter for follow-up surveillance of thyroid cancer        Past Medical History:   Diagnosis Date    COVID-19     Diverticulosis     Dry eye     Hypertension     PONV (postoperative nausea and vomiting)     Seasonal allergies     Thyroid nodule     Von Willebrand disease (HCC)      Social History     Socioeconomic History    Marital status: Single     Spouse name: Not on file    Number of children: Not on file    Years of education: Not on file    Highest education level: Not on file   Occupational History    Not on file   Tobacco Use    Smoking status: Never    Smokeless tobacco: Never   Vaping Use    Vaping status: Never Used   Substance and Sexual Activity    Alcohol use: Yes     Comment: social    Drug use: Never    Sexual activity: Not Currently     Comment: Defer   Other Topics Concern    Not on file   Social History Narrative    Not on file     Social Determinants of Health     Financial Resource Strain: Not on file   Food Insecurity: Not on file   Transportation Needs: Not on file   Physical Activity: Not on file   Stress: Not on file   Social Connections: Not on file   Intimate Partner Violence: Not on file   Housing Stability: Not on file      Family History   Problem Relation Age of Onset    Hypertension Mother     Hyperlipidemia Mother     Diabetes type II Mother     Breast cancer Mother     Thyroid cancer Mother     Hypertension Father     Heart attack Father      Hyperlipidemia Father     Lung cancer Father     Hypertension Maternal Grandmother     Cancer Maternal Grandmother     No Known Problems Maternal Grandfather     No Known Problems Paternal Grandmother     No Known Problems Paternal Grandfather     Skin cancer Maternal Aunt     No Known Problems Paternal Aunt      Past Surgical History:   Procedure Laterality Date    ARTHROSCOPIC REPAIR ACL Left     CHOLECYSTECTOMY      COLONOSCOPY      HYSTERECTOMY  2010    partial hysterectomy    IR BIOPSY THYROID      KNEE ARTHROSCOPY Right     LIPOMA RESECTION      back    REDUCTION MAMMAPLASTY Bilateral 2016    THYROID LOBECTOMY Left 6/22/2023    Procedure: HEMITHYROIDECTOMY; LEFT;  Surgeon: Sana Oro MD;  Location: [facility];  Service: Surgical Oncology    TONSILLECTOMY      US GUIDED LYMPH NODE BIOPSY LEFT  05/26/2023    US GUIDED THYROID BIOPSY  03/10/2023       Current Outpatient Medications:     amLODIPine (NORVASC) 10 mg tablet, Take 1 tablet (10 mg total) by mouth daily, Disp: 90 tablet, Rfl: 3    atorvastatin (LIPITOR) 10 mg tablet, Take 1 tablet (10 mg total) by mouth daily, Disp: 90 tablet, Rfl: 3    cholecalciferol (VITAMIN D3) 1,000 units tablet, Take 2 tablets (2,000 Units total) by mouth daily, Disp: 60 tablet, Rfl: 0    hydroCHLOROthiazide 25 mg tablet, Take 1 tablet (25 mg total) by mouth daily, Disp: 90 tablet, Rfl: 3    lisinopril (ZESTRIL) 10 mg tablet, Take 1 tablet (10 mg total) by mouth daily, Disp: 90 tablet, Rfl: 3    Magnesium 250 MG TABS, Take 1 tablet (250 mg total) by mouth daily, Disp: 90 tablet, Rfl: 3    metoprolol tartrate (LOPRESSOR) 50 mg tablet, Take 1 tablet (50 mg total) by mouth 2 (two) times a day, Disp: 180 tablet, Rfl: 3    tirzepatide (Mounjaro) 5 MG/0.5ML, Inject 0.5 mL (5 mg total) under the skin every 7 days, Disp: 2 mL, Rfl: 3    mometasone (NASONEX) 50 mcg/act nasal spray, , Disp: , Rfl:   Allergies   Allergen Reactions    Pollen Extract Other (See Comments)     Seasonal   "      Labs:     Chemistry        Component Value Date/Time    K 4.1 12/22/2023 1004     12/22/2023 1004    CO2 29 12/22/2023 1004    BUN 9 12/22/2023 1004    CREATININE 0.66 12/22/2023 1004    CREATININE 1.07 07/13/2021 1548        Component Value Date/Time    CALCIUM 8.8 12/22/2023 1004    ALKPHOS 96 10/19/2021 1503    AST 14 10/19/2021 1503    ALT 34 10/19/2021 1503        Lipid panel 8/17/2023: C 181. T 350. H 37. L 74.     ECG 5/5/2023: Normal sinus rhythm. 83 bpm. Low voltage QRS.     Lipid panel 7/26/2022: C 140. T 233. H 39. L 54.      Lipid panel 9/11/2019: C 174. T 299. H 36. L 78.      Echocardiogram 05/17/2019:  EF 60%.  Grade 2 diastolic dysfunction.  Left atrial size upper limit of normal.    Review of Systems   Constitutional: Positive for malaise/fatigue.   HENT: Negative.     Cardiovascular:  Negative for chest pain, dyspnea on exertion, irregular heartbeat, leg swelling, near-syncope, orthopnea and palpitations.   Respiratory:  Negative for cough and snoring.    Endocrine: Negative.    Skin: Negative.    Musculoskeletal: Negative.    Gastrointestinal: Negative.    Genitourinary: Negative.    Neurological: Negative.    Psychiatric/Behavioral: Negative.         Vitals:    05/16/24 1457   BP: 134/78   Pulse: 68   Temp: 98.7 °F (37.1 °C)   SpO2: 98%     Vitals:    05/16/24 1457   Weight: 118 kg (260 lb)     Height: 5' 7\" (170.2 cm)   Body mass index is 40.72 kg/m².    Physical Exam  Vitals and nursing note reviewed.   Constitutional:       General: She is not in acute distress.     Appearance: She is well-developed. She is obese. She is not diaphoretic.   HENT:      Head: Normocephalic and atraumatic.   Neck:      Thyroid: No thyromegaly.      Vascular: No carotid bruit or JVD.   Cardiovascular:      Rate and Rhythm: Normal rate and regular rhythm. No extrasystoles are present.     Pulses: Intact distal pulses.           Radial pulses are 2+ on the right side and 2+ on the left side.      Heart " sounds: Normal heart sounds, S1 normal and S2 normal. No murmur heard.     Comments: No edema  Pulmonary:      Effort: Pulmonary effort is normal.      Breath sounds: Normal breath sounds.   Abdominal:      General: There is no distension.      Palpations: Abdomen is soft.      Tenderness: There is no abdominal tenderness.   Musculoskeletal:         General: Normal range of motion.      Cervical back: Normal range of motion and neck supple.   Lymphadenopathy:      Cervical: No cervical adenopathy.   Skin:     General: Skin is warm and dry.   Neurological:      Mental Status: She is alert and oriented to person, place, and time.      Cranial Nerves: No cranial nerve deficit.   Psychiatric:         Mood and Affect: Mood and affect normal.         Behavior: Behavior normal.

## 2024-05-16 NOTE — ASSESSMENT & PLAN NOTE
Pt has metabolic syndrome with T2DM.   Goal LDL < 70  Now on atorvastatin 10 mg daily. Previously on fenofibrate.  Lipid panel 8/17/2023: C 181. T 350. H 37. L 74.  Awaiting updated labs through endocrinology this month.  Consider resuming fenofibrate if TG > 200.

## 2024-05-16 NOTE — ASSESSMENT & PLAN NOTE
Lab Results   Component Value Date    HGBA1C 6.9 (H) 12/22/2023     Managed by [facility] endocrinology. Excellent control.  Reviewed cardiovascular risk associated with diabetes. Goal LDL < 70.  Remains on atorvastatin 10 mg daily.

## 2024-05-20 ENCOUNTER — PATIENT MESSAGE (OUTPATIENT)
Dept: ENDOCRINOLOGY | Facility: CLINIC | Age: 54
End: 2024-05-20

## 2024-05-21 ENCOUNTER — PATIENT MESSAGE (OUTPATIENT)
Dept: ENDOCRINOLOGY | Facility: CLINIC | Age: 54
End: 2024-05-21

## 2024-05-21 DIAGNOSIS — E11.9 TYPE 2 DIABETES MELLITUS WITHOUT COMPLICATION, WITHOUT LONG-TERM CURRENT USE OF INSULIN (HCC): ICD-10-CM

## 2024-07-01 ENCOUNTER — APPOINTMENT (OUTPATIENT)
Dept: LAB | Facility: HOSPITAL | Age: 54
End: 2024-07-01
Payer: COMMERCIAL

## 2024-07-01 DIAGNOSIS — E11.9 TYPE 2 DIABETES MELLITUS WITHOUT COMPLICATION, WITHOUT LONG-TERM CURRENT USE OF INSULIN (HCC): ICD-10-CM

## 2024-07-01 LAB
ANION GAP SERPL CALCULATED.3IONS-SCNC: 6 MMOL/L (ref 4–13)
BUN SERPL-MCNC: 15 MG/DL (ref 5–25)
CALCIUM SERPL-MCNC: 9.6 MG/DL (ref 8.4–10.2)
CHLORIDE SERPL-SCNC: 103 MMOL/L (ref 96–108)
CHOLEST SERPL-MCNC: 150 MG/DL
CO2 SERPL-SCNC: 30 MMOL/L (ref 21–32)
CREAT SERPL-MCNC: 0.63 MG/DL (ref 0.6–1.3)
EST. AVERAGE GLUCOSE BLD GHB EST-MCNC: 166 MG/DL
GFR SERPL CREATININE-BSD FRML MDRD: 102 ML/MIN/1.73SQ M
GLUCOSE P FAST SERPL-MCNC: 128 MG/DL (ref 65–99)
HBA1C MFR BLD: 7.4 %
HDLC SERPL-MCNC: 47 MG/DL
LDLC SERPL CALC-MCNC: 59 MG/DL (ref 0–100)
POTASSIUM SERPL-SCNC: 4 MMOL/L (ref 3.5–5.3)
SODIUM SERPL-SCNC: 139 MMOL/L (ref 135–147)
TRIGL SERPL-MCNC: 221 MG/DL

## 2024-07-01 PROCEDURE — 83036 HEMOGLOBIN GLYCOSYLATED A1C: CPT

## 2024-07-01 PROCEDURE — 80048 BASIC METABOLIC PNL TOTAL CA: CPT

## 2024-07-01 PROCEDURE — 36415 COLL VENOUS BLD VENIPUNCTURE: CPT

## 2024-07-01 PROCEDURE — 80061 LIPID PANEL: CPT

## 2024-07-11 ENCOUNTER — OFFICE VISIT (OUTPATIENT)
Dept: ENDOCRINOLOGY | Facility: CLINIC | Age: 54
End: 2024-07-11
Payer: COMMERCIAL

## 2024-07-11 VITALS
SYSTOLIC BLOOD PRESSURE: 118 MMHG | BODY MASS INDEX: 40.09 KG/M2 | HEART RATE: 67 BPM | OXYGEN SATURATION: 99 % | HEIGHT: 67 IN | WEIGHT: 255.4 LBS | DIASTOLIC BLOOD PRESSURE: 88 MMHG

## 2024-07-11 DIAGNOSIS — E11.9 TYPE 2 DIABETES MELLITUS WITHOUT COMPLICATION, WITHOUT LONG-TERM CURRENT USE OF INSULIN (HCC): Primary | ICD-10-CM

## 2024-07-11 DIAGNOSIS — E66.01 CLASS 2 SEVERE OBESITY DUE TO EXCESS CALORIES WITH SERIOUS COMORBIDITY AND BODY MASS INDEX (BMI) OF 39.0 TO 39.9 IN ADULT (HCC): ICD-10-CM

## 2024-07-11 DIAGNOSIS — E66.01 CLASS 3 SEVERE OBESITY DUE TO EXCESS CALORIES WITH SERIOUS COMORBIDITY AND BODY MASS INDEX (BMI) OF 40.0 TO 44.9 IN ADULT (HCC): ICD-10-CM

## 2024-07-11 DIAGNOSIS — I10 ESSENTIAL HYPERTENSION: ICD-10-CM

## 2024-07-11 DIAGNOSIS — E78.1 HYPERTRIGLYCERIDEMIA: ICD-10-CM

## 2024-07-11 DIAGNOSIS — Z98.890 HISTORY OF THYROID SURGERY: ICD-10-CM

## 2024-07-11 PROCEDURE — 99214 OFFICE O/P EST MOD 30 MIN: CPT | Performed by: STUDENT IN AN ORGANIZED HEALTH CARE EDUCATION/TRAINING PROGRAM

## 2024-07-11 NOTE — PROGRESS NOTES
Chary Schmidt 54 y.o. female MRN: [id number]    Encounter: [id number]      Assessment & Plan     1. Type 2 diabetes - stable. Would like to see A1c <6.5%. I believe Chary could benefit from class substitution of ozempic for mounjaro, which is more effective at A1c lowering and weight loss. I would like to initiate at 5 mg weekly dosing, and titrate up to 15 mg according to need and tolerability. If plan to start mounjaro not supported, we discussed consideration for low dose extended release metformin.     2. Hypertension - controlled. No changes to current Rx    3. Hyperlipidemia - uncontrolled with elevated triglycerides. Continue statin therapy    4. Obesity - stable.     5. 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 TFTs presently at goal, without therapy. Follow up thyroid US prior to next visit. If stable, will likely defer additional surveillance imaging.       Problem List Items Addressed This Visit     Essential hypertension    Type 2 diabetes mellitus without complication, without long-term current use of insulin ([facility]) - Primary    Relevant Medications    tirzepatide (Mounjaro) 7.5 MG/0.5ML    Other Relevant Orders    Hemoglobin A1C    Comprehensive metabolic panel    Hypertriglyceridemia   Other Visit Diagnoses     Class 2 severe obesity due to excess calories with serious comorbidity and body mass index (BMI) of 39.0 to 39.9 in adult (HCC)        History of thyroid surgery        Relevant Orders    TSH, 3rd generation with Free T4 reflex    Class 3 severe obesity due to excess calories with serious comorbidity and body mass index (BMI) of 40.0 to 44.9 in adult (HCC)              RTC 3-mo    CC: Diabetes    History of Present Illness     HPI:    Freda returns for follow up of diabetes. As always, she is here today with her mother.     Chary is disappointed with A1c and weight. She continues challenges with impulse control with re: to foods. She  would like to try to optimize her mounjaro dosing, presently at 5 mg weekly.     For hypertension she takes amlodipine 10 mg daily, HCTZ 25 mg daily, lisinopril 10 mg daily, and metoprolol 50 mg bid. For hyperlipidemia she takes lipitor 10 mg daily.     Review of Systems   Constitutional:  Negative for diaphoresis and unexpected weight change.   HENT:  Negative for trouble swallowing and voice change.    Cardiovascular:  Negative for palpitations.   Gastrointestinal:  Negative for nausea and vomiting.   Endocrine: Negative for polydipsia and polyuria.   Psychiatric/Behavioral:  Negative for agitation and behavioral problems.    All other systems reviewed and are negative.      Historical Information   Past Medical History:   Diagnosis Date   • COVID-19    • Diverticulosis    • Dry eye    • Hypertension    • PONV (postoperative nausea and vomiting)    • Seasonal allergies    • Thyroid nodule    • Von Willebrand disease (HCC)      Past Surgical History:   Procedure Laterality Date   • ARTHROSCOPIC REPAIR ACL Left    • CHOLECYSTECTOMY     • COLONOSCOPY     • HYSTERECTOMY  2010    partial hysterectomy   • IR BIOPSY THYROID     • KNEE ARTHROSCOPY Right    • LIPOMA RESECTION      back   • REDUCTION MAMMAPLASTY Bilateral 2016   • THYROID LOBECTOMY Left 6/22/2023    Procedure: HEMITHYROIDECTOMY; LEFT;  Surgeon: Sana Oro MD;  Location: [facility];  Service: Surgical Oncology   • TONSILLECTOMY     • US GUIDED LYMPH NODE BIOPSY LEFT  05/26/2023   • US GUIDED THYROID BIOPSY  03/10/2023     Social History   Social History     Substance and Sexual Activity   Alcohol Use Yes    Comment: social     Social History     Substance and Sexual Activity   Drug Use Never     Social History     Tobacco Use   Smoking Status Never   Smokeless Tobacco Never     Family History:   Family History   Problem Relation Age of Onset   • Hypertension Mother    • Hyperlipidemia Mother    • Diabetes type II Mother    • Breast cancer Mother    •  "Thyroid cancer Mother    • Hypertension Father    • Heart attack Father    • Hyperlipidemia Father    • Lung cancer Father    • Hypertension Maternal Grandmother    • Cancer Maternal Grandmother    • No Known Problems Maternal Grandfather    • No Known Problems Paternal Grandmother    • No Known Problems Paternal Grandfather    • Skin cancer Maternal Aunt    • No Known Problems Paternal Aunt        Meds/Allergies   Current Outpatient Medications   Medication Sig Dispense Refill   • amLODIPine (NORVASC) 10 mg tablet Take 1 tablet (10 mg total) by mouth daily 90 tablet 3   • atorvastatin (LIPITOR) 10 mg tablet Take 1 tablet (10 mg total) by mouth daily 90 tablet 3   • cholecalciferol (VITAMIN D3) 1,000 units tablet Take 2 tablets (2,000 Units total) by mouth daily 60 tablet 0   • hydroCHLOROthiazide 25 mg tablet Take 1 tablet (25 mg total) by mouth daily 90 tablet 3   • lisinopril (ZESTRIL) 10 mg tablet Take 1 tablet (10 mg total) by mouth daily 90 tablet 3   • Magnesium 250 MG TABS Take 1 tablet (250 mg total) by mouth daily 90 tablet 3   • metoprolol tartrate (LOPRESSOR) 50 mg tablet Take 1 tablet (50 mg total) by mouth 2 (two) times a day 180 tablet 3   • tirzepatide (Mounjaro) 7.5 MG/0.5ML Inject 0.5 mL (7.5 mg total) under the skin every 7 days 2 mL 3   • mometasone (NASONEX) 50 mcg/act nasal spray  (Patient not taking: Reported on 8/23/2023)       No current facility-administered medications for this visit.     Allergies   Allergen Reactions   • Pollen Extract Other (See Comments)     Seasonal        Objective   Vitals: Blood pressure 118/88, pulse 67, height 5' 7\" (1.702 m), weight 116 kg (255 lb 6.4 oz), SpO2 99%.    Physical Exam  Vitals reviewed.   Constitutional:       Appearance: Normal appearance.      Comments: Wears glasses.   HENT:      Head: Normocephalic.      Mouth/Throat:      Mouth: Mucous membranes are moist.   Eyes:      General: No scleral icterus.     Conjunctiva/sclera: Conjunctivae normal. "   Neck:      Thyroid: No thyroid mass, thyromegaly or thyroid tenderness.   Pulmonary:      Effort: Pulmonary effort is normal.      Breath sounds: Normal breath sounds.   Musculoskeletal:      Cervical back: Normal range of motion.   Skin:     General: Skin is warm and dry.   Neurological:      General: No focal deficit present.      Mental Status: She is alert.   Psychiatric:         Mood and Affect: Mood normal.           The history was obtained from the review of the chart, patient.    Lab Results:   Lab Results   Component Value Date/Time    Hemoglobin A1C 7.4 (H) 07/01/2024 08:00 AM    Hemoglobin A1C 6.9 (H) 12/22/2023 10:04 AM    Hemoglobin A1C 7.1 (H) 08/17/2023 10:04 AM    BUN 15 07/01/2024 08:00 AM    BUN 9 12/22/2023 10:04 AM    BUN 8 08/17/2023 10:04 AM    Potassium 4.0 07/01/2024 08:00 AM    Potassium 4.1 12/22/2023 10:04 AM    Potassium 3.9 08/17/2023 10:04 AM    Chloride 103 07/01/2024 08:00 AM    Chloride 104 12/22/2023 10:04 AM    Chloride 103 08/17/2023 10:04 AM    CO2 30 07/01/2024 08:00 AM    CO2 29 12/22/2023 10:04 AM    CO2 29 08/17/2023 10:04 AM    Creatinine 0.63 07/01/2024 08:00 AM    Creatinine 0.66 12/22/2023 10:04 AM    Creatinine 0.68 08/17/2023 10:04 AM    HDL, Direct 47 (L) 07/01/2024 08:00 AM    HDL, Direct 37 (L) 08/17/2023 10:04 AM    Triglycerides 221 (H) 07/01/2024 08:00 AM    Triglycerides 350 (H) 08/17/2023 10:04 AM     Lab Results   Component Value Date    LDLCALC 59 07/01/2024       Final Diagnosis   A. & B.  Thyroid, Left, Lower [place]: (Thin-Prep and smears)  Follicular neoplasm/Suspicious for follicular neoplasm ([facility] Category IV)  - See note.  Specimen consists almost exclusively of Hurthle cells.  Colloid and mixed inflammatory cells.     Satisfactory for evaluation.     Note:  (1) As reported in the [facility] System for Reporting Thyroid Cytopathology* this diagnostic category has demonstrated anywhere from 25-40% risk of malignancy being found in subsequent resections  "and/or FNA.  This risk of malignancy is expected to change due to the usage of the surgical pathology diagnosis of “non-invasive follicular thyroid neoplasm with papillary-like nuclear features (NIFTP).”  The anticipated risk of malignancy secondary to NIFTP is 10-40%.   The histologic follow-up of cases diagnosed as follicular neoplasm/suspicious for follicular neoplasm includes follicular adenoma, follicular carcinoma, and follicular variant of papillary thyroid carcinoma including the recently described indolent counterpart, NIFTP.  The manual reports that the usual management following this diagnosis is genetic testing or lobectomy. Ultimately, clinical/imaging correlation for this patient is needed in arriving at the actual management plan.            Imaging Studies:     Portions of the record may have been created with voice recognition software. Occasional wrong word or \"sound a like\" substitutions may have occurred due to the inherent limitations of voice recognition software. Read the chart carefully and recognize, using context, where substitutions have occurred.  "

## 2024-07-15 ENCOUNTER — OFFICE VISIT (OUTPATIENT)
Dept: URGENT CARE | Facility: CLINIC | Age: 54
End: 2024-07-15
Payer: COMMERCIAL

## 2024-07-15 VITALS
DIASTOLIC BLOOD PRESSURE: 88 MMHG | BODY MASS INDEX: 39.49 KG/M2 | SYSTOLIC BLOOD PRESSURE: 152 MMHG | HEIGHT: 67 IN | RESPIRATION RATE: 18 BRPM | HEART RATE: 102 BPM | WEIGHT: 251.6 LBS | OXYGEN SATURATION: 97 % | TEMPERATURE: 99.7 F

## 2024-07-15 DIAGNOSIS — H92.02 LEFT EAR PAIN: ICD-10-CM

## 2024-07-15 DIAGNOSIS — B34.9 VIRAL SYNDROME: Primary | ICD-10-CM

## 2024-07-15 PROCEDURE — G0382 LEV 3 HOSP TYPE B ED VISIT: HCPCS | Performed by: PHYSICIAN ASSISTANT

## 2024-07-15 PROCEDURE — S9083 URGENT CARE CENTER GLOBAL: HCPCS | Performed by: PHYSICIAN ASSISTANT

## 2024-07-15 NOTE — PROGRESS NOTES
[facility]        NAME: Chary Schmidt is a 54 y.o. female  : 1970    MRN: [id number]  DATE: July 15, 2024  TIME: 1:42 PM    Assessment and Plan   Viral syndrome [B34.9]  1. Viral syndrome        2. Left ear pain              Patient Instructions   Over-the-counter cold and flu medications as needed for symptoms.  Drink plenty of fluids.  Follow-up with the PCP in 3 to 5 days if her symptoms do not improve.  Go to ER if symptoms become severe.    Over-the-counter cold medication 101:  -Guanfacine (ex. Mucinex) is a mucus thinner.  Good for sinus or chest congestion.  Works best if you drink plenty of fluids.  -Dextromethorphan (ex. Delsym, Mucinex DM) as a cough suppressant  -Pseudoephedrine (ex. Sudaphed) is a decongestion and should not be used by those with high blood pressure or heart problems.  -Flonase is a intranasal steroid.  Good for sinus congestion and postnasal drip.  -Antihistamines (Claritin, Zyrtec, Allegra Benadryl) are used for allergies as well as colds for treatment of congestion and ear fullness.      Follow up with PCP in 3-5 days.  Proceed to  ER if symptoms worsen.    If tests have been performed at [facility], our office will contact you with results if changes need to be made to the care plan discussed with you at the visit.  You can review your full results on [facility].    Chief Complaint     Chief Complaint   Patient presents with    Cold Like Symptoms     Sinus congestion, body aches, fever since Saturday  and left ear pain (x 1 week)          History of Present Illness       Patient is a 54-year-old female significant past medical history of hypertension and von Willebrand disease presents the office complaining of fever 101, bodies, congestion, cough, and sore throat for 3 days.  States she has had left ear pain for approximately 1 week.    URI   This is a new problem. The current episode started in the past 7 days. The problem has been gradually worsening. The  maximum temperature recorded prior to her arrival was 101 - 101.9 F. Associated symptoms include congestion, ear pain, headaches and a sore throat. Pertinent negatives include no abdominal pain, chest pain, coughing, diarrhea, nausea, rash or vomiting. She has tried acetaminophen for the symptoms. The treatment provided moderate relief.       Review of Systems   Review of Systems   Constitutional:  Positive for appetite change, chills, fatigue and fever.   HENT:  Positive for congestion, ear pain and sore throat.    Respiratory:  Negative for cough and shortness of breath.    Cardiovascular:  Negative for chest pain and palpitations.   Gastrointestinal:  Negative for abdominal pain, diarrhea, nausea and vomiting.   Musculoskeletal:  Positive for myalgias.   Skin:  Negative for rash.   Neurological:  Positive for headaches. Negative for dizziness and light-headedness.         Current Medications       Current Outpatient Medications:     amLODIPine (NORVASC) 10 mg tablet, Take 1 tablet (10 mg total) by mouth daily, Disp: 90 tablet, Rfl: 3    atorvastatin (LIPITOR) 10 mg tablet, Take 1 tablet (10 mg total) by mouth daily, Disp: 90 tablet, Rfl: 3    cholecalciferol (VITAMIN D3) 1,000 units tablet, Take 2 tablets (2,000 Units total) by mouth daily, Disp: 60 tablet, Rfl: 0    hydroCHLOROthiazide 25 mg tablet, Take 1 tablet (25 mg total) by mouth daily, Disp: 90 tablet, Rfl: 3    lisinopril (ZESTRIL) 10 mg tablet, Take 1 tablet (10 mg total) by mouth daily, Disp: 90 tablet, Rfl: 3    Magnesium 250 MG TABS, Take 1 tablet (250 mg total) by mouth daily, Disp: 90 tablet, Rfl: 3    metoprolol tartrate (LOPRESSOR) 50 mg tablet, Take 1 tablet (50 mg total) by mouth 2 (two) times a day, Disp: 180 tablet, Rfl: 3    tirzepatide (Mounjaro) 7.5 MG/0.5ML, Inject 0.5 mL (7.5 mg total) under the skin every 7 days, Disp: 2 mL, Rfl: 3    mometasone (NASONEX) 50 mcg/act nasal spray, , Disp: , Rfl:     Current Allergies     Allergies as of  "07/15/2024 - Reviewed 07/15/2024   Allergen Reaction Noted    Pollen extract Other (See Comments) 05/08/2023            The following portions of the patient's history were reviewed and updated as appropriate: allergies, current medications, past family history, past medical history, past social history, past surgical history and problem list.     Past Medical History:   Diagnosis Date    COVID-19     Diverticulosis     Dry eye     Hypertension     PONV (postoperative nausea and vomiting)     Seasonal allergies     Thyroid nodule     Von Willebrand disease (HCC)        Past Surgical History:   Procedure Laterality Date    ARTHROSCOPIC REPAIR ACL Left     CHOLECYSTECTOMY      COLONOSCOPY      HYSTERECTOMY  2010    partial hysterectomy    IR BIOPSY THYROID      KNEE ARTHROSCOPY Right     LIPOMA RESECTION      back    REDUCTION MAMMAPLASTY Bilateral 2016    THYROID LOBECTOMY Left 6/22/2023    Procedure: HEMITHYROIDECTOMY; LEFT;  Surgeon: Sana Oro MD;  Location: [facility];  Service: Surgical Oncology    TONSILLECTOMY      US GUIDED LYMPH NODE BIOPSY LEFT  05/26/2023    US GUIDED THYROID BIOPSY  03/10/2023       Family History   Problem Relation Age of Onset    Hypertension Mother     Hyperlipidemia Mother     Diabetes type II Mother     Breast cancer Mother     Thyroid cancer Mother     Hypertension Father     Heart attack Father     Hyperlipidemia Father     Lung cancer Father     Hypertension Maternal Grandmother     Cancer Maternal Grandmother     No Known Problems Maternal Grandfather     No Known Problems Paternal Grandmother     No Known Problems Paternal Grandfather     Skin cancer Maternal Aunt     No Known Problems Paternal Aunt          Medications have been verified.        Objective   /88   Pulse 102   Temp 99.7 °F (37.6 °C) (Tympanic)   Resp 18   Ht 5' 7\" (1.702 m)   Wt 114 kg (251 lb 9.6 oz)   SpO2 97%   BMI 39.41 kg/m²   No LMP recorded. Patient has had a hysterectomy.     "   Physical Exam     Physical Exam  Vitals and nursing note reviewed.   Constitutional:       Appearance: Normal appearance. She is well-developed.   HENT:      Head: Normocephalic and atraumatic.      Right Ear: Tympanic membrane, ear canal and external ear normal.      Left Ear: Tympanic membrane, ear canal and external ear normal.      Nose: Congestion and rhinorrhea present.      Mouth/Throat:      Pharynx: Uvula midline.   Eyes:      General: Lids are normal.      Conjunctiva/sclera: Conjunctivae normal.      Pupils: Pupils are equal, round, and reactive to light.   Cardiovascular:      Rate and Rhythm: Normal rate and regular rhythm.      Pulses: Normal pulses.      Heart sounds: Normal heart sounds. No murmur heard.     No friction rub. No gallop.   Pulmonary:      Effort: Pulmonary effort is normal.      Breath sounds: Normal breath sounds. No wheezing, rhonchi or rales.   Musculoskeletal:         General: Normal range of motion.      Cervical back: Neck supple.   Lymphadenopathy:      Cervical: No cervical adenopathy.   Skin:     General: Skin is warm and dry.      Capillary Refill: Capillary refill takes less than 2 seconds.   Neurological:      Mental Status: She is alert.

## 2024-07-15 NOTE — LETTER
July 15, 2024     Patient: Chary Schmidt   YOB: 1970   Date of Visit: 7/15/2024       To Whom It May Concern:    It is my medical opinion that Chary Schmidt should remain out of work until fever free for 24 hours .           Sincerely,        Savita Castillo PA-C

## 2024-07-31 ENCOUNTER — TELEPHONE (OUTPATIENT)
Age: 54
End: 2024-07-31

## 2024-07-31 DIAGNOSIS — E11.9 TYPE 2 DIABETES MELLITUS WITHOUT COMPLICATION, WITHOUT LONG-TERM CURRENT USE OF INSULIN (HCC): ICD-10-CM

## 2024-07-31 NOTE — TELEPHONE ENCOUNTER
Mounjaro 7.5 mg    30 day supply    [facility] in [place]    This is NOT a duplicate. Patient requesting a change in pharmacy. Please advise and sent to new pharmacy - new pharmacy attached to medication.

## 2024-07-31 NOTE — TELEPHONE ENCOUNTER
PA for tirzepatide (Mounjaro) 7.5 MG/0.5ML  Approved     Date(s) approved January 7, 2024 to January 7, 2025     Case #     Patient advised by          [x] MyChart Message  [] Phone call   []LMOM  []L/M to call office as no active Communication consent on file  []Unable to leave detailed message as VM not approved on Communication consent       Pharmacy advised by    [x]Fax  []Phone call    Approval letter scanned into Media Yes

## 2024-08-22 DIAGNOSIS — E11.9 TYPE 2 DIABETES MELLITUS WITHOUT COMPLICATION, WITHOUT LONG-TERM CURRENT USE OF INSULIN (HCC): Primary | ICD-10-CM

## 2024-08-22 DIAGNOSIS — E11.9 TYPE 2 DIABETES MELLITUS WITHOUT COMPLICATION, WITHOUT LONG-TERM CURRENT USE OF INSULIN (HCC): ICD-10-CM

## 2024-08-22 NOTE — TELEPHONE ENCOUNTER
Mounjaro 10 mg    30 day supply    [facility] in [place]       This is NOT a duplicate, patient is requesting a new pharmacy - please advise and sent to new pharmacy. New pharmacy attached to medication.

## 2024-08-23 ENCOUNTER — TELEPHONE (OUTPATIENT)
Age: 54
End: 2024-08-23

## 2024-08-23 NOTE — TELEPHONE ENCOUNTER
PA for tirzepatide (Mounjaro) 10 MG/0.5ML  APPROVED     Date(s) approved January 7, 2024 to January 7, 2025     Case #     Patient advised by          [x] MyChart Message  [] Phone call   []LMOM  []L/M to call office as no active Communication consent on file  []Unable to leave detailed message as VM not approved on Communication consent       Pharmacy advised by    [x]Fax  []Phone call    Approval letter scanned into Media No

## 2024-09-15 ENCOUNTER — PATIENT MESSAGE (OUTPATIENT)
Dept: ENDOCRINOLOGY | Facility: CLINIC | Age: 54
End: 2024-09-15

## 2024-09-15 DIAGNOSIS — E11.9 TYPE 2 DIABETES MELLITUS WITHOUT COMPLICATION, WITHOUT LONG-TERM CURRENT USE OF INSULIN (HCC): Primary | ICD-10-CM

## 2024-09-26 DIAGNOSIS — E11.9 TYPE 2 DIABETES MELLITUS WITHOUT COMPLICATION, WITHOUT LONG-TERM CURRENT USE OF INSULIN (HCC): ICD-10-CM

## 2024-10-02 ENCOUNTER — TELEPHONE (OUTPATIENT)
Dept: ENDOCRINOLOGY | Facility: CLINIC | Age: 54
End: 2024-10-02

## 2024-10-04 ENCOUNTER — APPOINTMENT (OUTPATIENT)
Dept: LAB | Facility: HOSPITAL | Age: 54
End: 2024-10-04
Payer: COMMERCIAL

## 2024-10-04 DIAGNOSIS — E11.9 TYPE 2 DIABETES MELLITUS WITHOUT COMPLICATION, WITHOUT LONG-TERM CURRENT USE OF INSULIN (HCC): ICD-10-CM

## 2024-10-04 DIAGNOSIS — Z98.890 HISTORY OF THYROID SURGERY: ICD-10-CM

## 2024-10-04 LAB
ALBUMIN SERPL BCG-MCNC: 4.1 G/DL (ref 3.5–5)
ALP SERPL-CCNC: 79 U/L (ref 34–104)
ALT SERPL W P-5'-P-CCNC: 27 U/L (ref 7–52)
ANION GAP SERPL CALCULATED.3IONS-SCNC: 7 MMOL/L (ref 4–13)
AST SERPL W P-5'-P-CCNC: 20 U/L (ref 13–39)
BILIRUB SERPL-MCNC: 1.2 MG/DL (ref 0.2–1)
BUN SERPL-MCNC: 12 MG/DL (ref 5–25)
CALCIUM SERPL-MCNC: 9.2 MG/DL (ref 8.4–10.2)
CHLORIDE SERPL-SCNC: 103 MMOL/L (ref 96–108)
CO2 SERPL-SCNC: 29 MMOL/L (ref 21–32)
CREAT SERPL-MCNC: 0.77 MG/DL (ref 0.6–1.3)
EST. AVERAGE GLUCOSE BLD GHB EST-MCNC: 143 MG/DL
GFR SERPL CREATININE-BSD FRML MDRD: 88 ML/MIN/1.73SQ M
GLUCOSE SERPL-MCNC: 136 MG/DL (ref 65–140)
HBA1C MFR BLD: 6.6 %
POTASSIUM SERPL-SCNC: 4.2 MMOL/L (ref 3.5–5.3)
PROT SERPL-MCNC: 6.8 G/DL (ref 6.4–8.4)
SODIUM SERPL-SCNC: 139 MMOL/L (ref 135–147)
TSH SERPL DL<=0.05 MIU/L-ACNC: 2.92 UIU/ML (ref 0.45–4.5)

## 2024-10-04 PROCEDURE — 80053 COMPREHEN METABOLIC PANEL: CPT

## 2024-10-04 PROCEDURE — 36415 COLL VENOUS BLD VENIPUNCTURE: CPT

## 2024-10-04 PROCEDURE — 84443 ASSAY THYROID STIM HORMONE: CPT

## 2024-10-04 PROCEDURE — 83036 HEMOGLOBIN GLYCOSYLATED A1C: CPT

## 2024-10-06 DIAGNOSIS — E78.2 MIXED HYPERLIPIDEMIA: ICD-10-CM

## 2024-10-07 RX ORDER — ATORVASTATIN CALCIUM 10 MG/1
10 TABLET, FILM COATED ORAL DAILY
Qty: 90 TABLET | Refills: 1 | Status: SHIPPED | OUTPATIENT
Start: 2024-10-07

## 2024-10-10 ENCOUNTER — OFFICE VISIT (OUTPATIENT)
Dept: ENDOCRINOLOGY | Facility: CLINIC | Age: 54
End: 2024-10-10
Payer: COMMERCIAL

## 2024-10-10 VITALS
BODY MASS INDEX: 39.39 KG/M2 | WEIGHT: 251 LBS | TEMPERATURE: 98.3 F | HEIGHT: 67 IN | OXYGEN SATURATION: 98 % | SYSTOLIC BLOOD PRESSURE: 132 MMHG | HEART RATE: 94 BPM | DIASTOLIC BLOOD PRESSURE: 90 MMHG

## 2024-10-10 DIAGNOSIS — E11.9 TYPE 2 DIABETES MELLITUS WITHOUT COMPLICATION, WITHOUT LONG-TERM CURRENT USE OF INSULIN (HCC): Primary | ICD-10-CM

## 2024-10-10 DIAGNOSIS — E66.01 CLASS 2 SEVERE OBESITY DUE TO EXCESS CALORIES WITH SERIOUS COMORBIDITY AND BODY MASS INDEX (BMI) OF 39.0 TO 39.9 IN ADULT (HCC): ICD-10-CM

## 2024-10-10 DIAGNOSIS — E66.812 CLASS 2 SEVERE OBESITY DUE TO EXCESS CALORIES WITH SERIOUS COMORBIDITY AND BODY MASS INDEX (BMI) OF 39.0 TO 39.9 IN ADULT (HCC): ICD-10-CM

## 2024-10-10 DIAGNOSIS — I10 ESSENTIAL HYPERTENSION: ICD-10-CM

## 2024-10-10 DIAGNOSIS — C73 THYROID CANCER (HCC): ICD-10-CM

## 2024-10-10 PROCEDURE — 99214 OFFICE O/P EST MOD 30 MIN: CPT | Performed by: STUDENT IN AN ORGANIZED HEALTH CARE EDUCATION/TRAINING PROGRAM

## 2024-10-10 RX ORDER — MELOXICAM 15 MG/1
15 TABLET ORAL DAILY
COMMUNITY
Start: 2024-10-09

## 2024-10-10 NOTE — ASSESSMENT & PLAN NOTE
Improving. Continue mounjaro, may optimize to 15 mg weekly dosing. Will follow labs in 4-mo  Lab Results   Component Value Date    HGBA1C 6.6 (H) 10/04/2024       Orders:    Comprehensive metabolic panel; Future    Lipid Panel with Direct LDL reflex; Future    Hemoglobin A1C; Future    Albumin / creatinine urine ratio; Future    tirzepatide (Mounjaro) 15 MG/0.5ML; Inject 0.5 mL (15 mg total) under the skin every 7 days

## 2024-10-10 NOTE — PROGRESS NOTES
"Ambulatory Visit  Name: Chary Schmidt      : 1970      MRN: [id number]  Encounter Provider: Yuniel Lucas DO  Encounter Date: 10/10/2024   Encounter department: [facility]    Assessment & Plan  Type 2 diabetes mellitus without complication, without long-term current use of insulin (HCC)  Improving. Continue mounjaro, may optimize to 15 mg weekly dosing. Will follow labs in 4-mo  Lab Results   Component Value Date    HGBA1C 6.6 (H) 10/04/2024       Orders:    Comprehensive metabolic panel; Future    Lipid Panel with Direct LDL reflex; Future    Hemoglobin A1C; Future    Albumin / creatinine urine ratio; Future    tirzepatide (Mounjaro) 15 MG/0.5ML; Inject 0.5 mL (15 mg total) under the skin every 7 days    Class 2 severe obesity due to excess calories with serious comorbidity and body mass index (BMI) of 39.0 to 39.9 in adult (HCC)  Overall stable       Thyroid cancer (HCC)  Hx papillary microcarcinoma/hurthle cell adenoma. Hx partial thyroidectomy. TSH is normal, no levothyroxine.   Orders:    TSH, 3rd generation; Future    T4, free; Future    Essential hypertension  Good control. Will follow       RTC 4-mo    History of Present Illness     Chary Schmidt is a 54 y.o. female who presents in follow up of T2D and papillary microcarcinoma. She is doing well. She is happy about A1c. She is tolerating mounjaro 12.5 mg weekly and notes suppression of appetite cravings, no GI discomfort. No CBG testing. No hyperglycemic sx, no hypoglycemia. For HLD she takes lipitor 10 mg daily.       Review of Systems   Constitutional:  Negative for unexpected weight change.   Endocrine: Negative for polydipsia and polyuria.   All other systems reviewed and are negative.          Objective     /90 (BP Location: Left arm, Patient Position: Sitting)   Pulse 94   Temp 98.3 °F (36.8 °C)   Ht 5' 7\" (1.702 m)   Wt 114 kg (251 lb)   SpO2 98%   BMI 39.31 kg/m²     Physical " Exam  Vitals reviewed.   Constitutional:       General: She is not in acute distress.     Appearance: Normal appearance.   HENT:      Head: Normocephalic and atraumatic.   Eyes:      General: No scleral icterus.     Conjunctiva/sclera: Conjunctivae normal.   Pulmonary:      Effort: Pulmonary effort is normal. No respiratory distress.   Musculoskeletal:         General: No deformity.      Cervical back: Normal range of motion.   Neurological:      General: No focal deficit present.      Mental Status: She is alert.   Psychiatric:         Mood and Affect: Mood normal.         Behavior: Behavior normal.         Component      Latest Ref Rng 10/4/2024   Sodium      135 - 147 mmol/L 139    Potassium      3.5 - 5.3 mmol/L 4.2    Chloride      96 - 108 mmol/L 103    Carbon Dioxide      21 - 32 mmol/L 29    ANION GAP      4 - 13 mmol/L 7    BUN      5 - 25 mg/dL 12    Creatinine      0.60 - 1.30 mg/dL 0.77    GLUCOSE      65 - 140 mg/dL 136    Calcium      8.4 - 10.2 mg/dL 9.2    AST      13 - 39 U/L 20    ALT      7 - 52 U/L 27    ALK PHOS      34 - 104 U/L 79    Total Protein      6.4 - 8.4 g/dL 6.8    Albumin      3.5 - 5.0 g/dL 4.1    Total Bilirubin      0.20 - 1.00 mg/dL 1.20 (H)    GFR, Calculated      ml/min/1.73sq m 88    Hemoglobin A1C      Normal 4.0-5.6%; PreDiabetic 5.7-6.4%; Diabetic >=6.5%; Glycemic control for adults with diabetes <7.0% % 6.6 (H)    eAG, EST AVG Glucose      mg/dl 143    TSH 3RD GENERATON      0.450 - 4.500 uIU/mL 2.918       Legend:  (H) High

## 2025-01-03 ENCOUNTER — TELEPHONE (OUTPATIENT)
Dept: ENDOCRINOLOGY | Facility: CLINIC | Age: 55
End: 2025-01-03

## 2025-01-03 NOTE — TELEPHONE ENCOUNTER
PA for (Mounjaro) 15 MG/0.5MLSUBMITTED to [employer]    via    [x]CMM-KEY: [id number]    [x]PA sent as URGENT    All office notes, labs and other pertaining documents and studies sent. Clinical questions answered. Awaiting determination from insurance company.     Turnaround time for your insurance to make a decision on your Prior Authorization can take 7-21 business days.

## 2025-01-03 NOTE — TELEPHONE ENCOUNTER
Received paper stating patient is due for prior Auth due to expiration date thru cover my meds  KEY: [id number]  LAST NAME:KEIKO  DATE OF BIRTH: [birth year]    MOUNJARO 5 MG/0.5 ML AUTO INJECTIONS

## 2025-01-07 NOTE — TELEPHONE ENCOUNTER
PA for (Mounjaro) 15 MG APPROVED     Date(s) approved 12/04/2024 to 01/03/2026       Patient advised by          [x][employer] Message  []Phone call   [x]LMOM  []L/M to call office as no active Communication consent on file  []Unable to leave detailed message as VM not approved on Communication consent       Pharmacy advised by    [x]Fax  []Phone call    Approval letter scanned into Media Yes

## 2025-01-23 ENCOUNTER — TELEPHONE (OUTPATIENT)
Dept: CARDIOLOGY CLINIC | Facility: CLINIC | Age: 55
End: 2025-01-23

## 2025-02-26 ENCOUNTER — TELEPHONE (OUTPATIENT)
Dept: ENDOCRINOLOGY | Facility: CLINIC | Age: 55
End: 2025-02-26

## 2025-02-26 NOTE — TELEPHONE ENCOUNTER
Left message for patient to have labs completed before appt next week   What Type Of Note Output Would You Prefer (Optional)?: Bullet Format How Severe Is Your Acne?: mild Is This A New Presentation, Or A Follow-Up?: Acne

## 2025-02-27 ENCOUNTER — APPOINTMENT (OUTPATIENT)
Dept: LAB | Facility: CLINIC | Age: 55
End: 2025-02-27
Payer: COMMERCIAL

## 2025-02-27 DIAGNOSIS — E11.9 TYPE 2 DIABETES MELLITUS WITHOUT COMPLICATION, WITHOUT LONG-TERM CURRENT USE OF INSULIN (HCC): ICD-10-CM

## 2025-02-27 DIAGNOSIS — C73 THYROID CANCER (HCC): ICD-10-CM

## 2025-02-27 LAB
ALBUMIN SERPL BCG-MCNC: 4.2 G/DL (ref 3.5–5)
ALP SERPL-CCNC: 82 U/L (ref 34–104)
ALT SERPL W P-5'-P-CCNC: 17 U/L (ref 7–52)
ANION GAP SERPL CALCULATED.3IONS-SCNC: 4 MMOL/L (ref 4–13)
AST SERPL W P-5'-P-CCNC: 17 U/L (ref 13–39)
BILIRUB SERPL-MCNC: 1.2 MG/DL (ref 0.2–1)
BUN SERPL-MCNC: 13 MG/DL (ref 5–25)
CALCIUM SERPL-MCNC: 9.6 MG/DL (ref 8.4–10.2)
CHLORIDE SERPL-SCNC: 100 MMOL/L (ref 96–108)
CHOLEST SERPL-MCNC: 145 MG/DL (ref ?–200)
CO2 SERPL-SCNC: 32 MMOL/L (ref 21–32)
CREAT SERPL-MCNC: 0.87 MG/DL (ref 0.6–1.3)
CREAT UR-MCNC: 180.1 MG/DL
EST. AVERAGE GLUCOSE BLD GHB EST-MCNC: 143 MG/DL
GFR SERPL CREATININE-BSD FRML MDRD: 76 ML/MIN/1.73SQ M
GLUCOSE P FAST SERPL-MCNC: 119 MG/DL (ref 65–99)
HBA1C MFR BLD: 6.6 %
HDLC SERPL-MCNC: 38 MG/DL
LDLC SERPL CALC-MCNC: 54 MG/DL (ref 0–100)
MICROALBUMIN UR-MCNC: 26.3 MG/L
MICROALBUMIN/CREAT 24H UR: 15 MG/G CREATININE (ref 0–30)
POTASSIUM SERPL-SCNC: 4.1 MMOL/L (ref 3.5–5.3)
PROT SERPL-MCNC: 6.7 G/DL (ref 6.4–8.4)
SODIUM SERPL-SCNC: 136 MMOL/L (ref 135–147)
T4 FREE SERPL-MCNC: 1.32 NG/DL (ref 0.61–1.12)
TRIGL SERPL-MCNC: 266 MG/DL (ref ?–150)
TSH SERPL DL<=0.05 MIU/L-ACNC: 1.99 UIU/ML (ref 0.45–4.5)

## 2025-02-27 PROCEDURE — 82570 ASSAY OF URINE CREATININE: CPT

## 2025-02-27 PROCEDURE — 84443 ASSAY THYROID STIM HORMONE: CPT

## 2025-02-27 PROCEDURE — 80053 COMPREHEN METABOLIC PANEL: CPT

## 2025-02-27 PROCEDURE — 36415 COLL VENOUS BLD VENIPUNCTURE: CPT

## 2025-02-27 PROCEDURE — 80061 LIPID PANEL: CPT

## 2025-02-27 PROCEDURE — 83036 HEMOGLOBIN GLYCOSYLATED A1C: CPT

## 2025-02-27 PROCEDURE — 82043 UR ALBUMIN QUANTITATIVE: CPT

## 2025-02-27 PROCEDURE — 84439 ASSAY OF FREE THYROXINE: CPT

## 2025-02-28 ENCOUNTER — RESULTS FOLLOW-UP (OUTPATIENT)
Dept: ENDOCRINOLOGY | Facility: CLINIC | Age: 55
End: 2025-02-28

## 2025-03-03 ENCOUNTER — OFFICE VISIT (OUTPATIENT)
Dept: ENDOCRINOLOGY | Facility: CLINIC | Age: 55
End: 2025-03-03
Payer: COMMERCIAL

## 2025-03-03 VITALS — DIASTOLIC BLOOD PRESSURE: 82 MMHG | SYSTOLIC BLOOD PRESSURE: 112 MMHG | BODY MASS INDEX: 39.31 KG/M2 | HEIGHT: 67 IN

## 2025-03-03 DIAGNOSIS — E80.6 HYPERBILIRUBINEMIA: ICD-10-CM

## 2025-03-03 DIAGNOSIS — I10 ESSENTIAL HYPERTENSION: ICD-10-CM

## 2025-03-03 DIAGNOSIS — E11.9 TYPE 2 DIABETES MELLITUS WITHOUT COMPLICATION, WITHOUT LONG-TERM CURRENT USE OF INSULIN (HCC): Primary | ICD-10-CM

## 2025-03-03 DIAGNOSIS — C73 THYROID CANCER (HCC): ICD-10-CM

## 2025-03-03 DIAGNOSIS — R53.83 OTHER FATIGUE: ICD-10-CM

## 2025-03-03 PROCEDURE — 99214 OFFICE O/P EST MOD 30 MIN: CPT | Performed by: PHYSICIAN ASSISTANT

## 2025-03-03 RX ORDER — CELECOXIB 400 MG/1
400 CAPSULE ORAL DAILY
COMMUNITY
Start: 2025-01-08

## 2025-03-03 RX ORDER — HYDROCHLOROTHIAZIDE 25 MG/1
12.5 TABLET ORAL DAILY
Status: SHIPPED
Start: 2025-03-03

## 2025-03-03 NOTE — ASSESSMENT & PLAN NOTE
Lab Results   Component Value Date    HGBA1C 6.6 (H) 02/27/2025   Well controlled on GLP1 as monotherapy - to continue Mounjaro at 15mg weekly dose.   BG monitoring - ok to continue PRN BG checks , may wish to increase to a few times a week to ensure stability of BG trends.   To continue healthy diet and exercise habits as part of DM management.   Follow up- 3-4 months.

## 2025-03-03 NOTE — ASSESSMENT & PLAN NOTE
Uncertain etiology, worsening. Possible medication effects (on BB, BP over-treatment).   Will screen for anemia, iron deficiency, vitamin D deficiency to start.     Orders:    CBC and differential; Future    Vitamin D 25 hydroxy; Future    Iron Panel (Includes Ferritin, Iron Sat%, Iron, and TIBC); Future

## 2025-03-03 NOTE — ASSESSMENT & PLAN NOTE
Hx papillary microcarcinoma/hurthle cell adenoma. Hx left partial thyroidectomy in June 2023.  Normal TSH with free T4 slightly elevated - will follow along with thyroglobulin level..   US thyroid still pending - to review at next visit.    Orders:    TSH, 3rd generation; Future    T4, free; Future    Thyroglobulin; Future

## 2025-03-03 NOTE — PROGRESS NOTES
Name: Chary Schmidt      : 1970      MRN: [id number]  Encounter Provider: Sarita Brown PA-C  Encounter Date: 3/3/2025   Encounter department: [facility]  :  Assessment & Plan  Type 2 diabetes mellitus without complication, without long-term current use of insulin (HCC)    Lab Results   Component Value Date    HGBA1C 6.6 (H) 2025   Well controlled on GLP1 as monotherapy - to continue Mounjaro at 15mg weekly dose.   BG monitoring - ok to continue PRN BG checks , may wish to increase to a few times a week to ensure stability of BG trends.   To continue healthy diet and exercise habits as part of DM management.   Follow up- 3-4 months.          Essential hypertension  BP today stable 112/82. Some reduced GFR on recent labs.   Feels dry / dehydrated, suspect decreased PO fluid intake with GLP1.   Will trial reduced dosing HCTZ - 1/2 tablet - 12.5mg daily.   To follow BP at home to ensure stability.     Orders:    hydroCHLOROthiazide 25 mg tablet; Take 0.5 tablets (12.5 mg total) by mouth daily    Other fatigue  Uncertain etiology, worsening. Possible medication effects (on BB, BP over-treatment).   Will screen for anemia, iron deficiency, vitamin D deficiency to start.     Orders:    CBC and differential; Future    Vitamin D 25 hydroxy; Future    Iron Panel (Includes Ferritin, Iron Sat%, Iron, and TIBC); Future    Hyperbilirubinemia  Without elevation of LFTs, or concerning GI symptoms, jaundice. Will follow with CMP total bilirubin.  Orders:    Comprehensive metabolic panel; Future    Bilirubin, total; Future    Thyroid cancer (HCC)  Hx papillary microcarcinoma/hurthle cell adenoma. Hx left partial thyroidectomy in 2023.  Normal TSH with free T4 slightly elevated - will follow along with thyroglobulin level..   US thyroid still pending - to review at next visit.    Orders:    TSH, 3rd generation; Future    T4, free; Future    Thyroglobulin; Future    I  "have spent a total time of 35 minutes in caring for this patient on the day of the visit/encounter including Diagnostic results, Impressions, Counseling / Coordination of care, and Obtaining or reviewing history  .     History of Present Illness   HPI  Chary Schmidt is a 54 y.o. female who presents for routine follow up for type 2 DM.   Most recent endocrinology visit: 10/10/24 with Dr. Lucas.     BG checks - rare.   1 month ago - BG spot check, 120.   Has glucometer and supplies available.   No symptoms especially suggestive of hypo- or hyperglycemia.    She started Mounjaro 15mg weekly since her last visit - no problems to report. She has lost about 60lbs in total since starting GLP1 therapy.    Chary as a H/o CVA, incidentally noted. No known h/o neuropathy, retinopathy.     DM eye exams - up to date - through optometry at [place].   DM foot exams - up to date - through Dr. Crowley in [place].     For hyperlipidemia she takes a statin.   For hypertension she takes multiple BP medications including: ACE-I, BB, Norvasc, HCTZ. O    Re: thyroid cancer:   Hx papillary microcarcinoma / Hurthle cell adenoma. Hx partial thyroidectomy - left side. Surgery by Dr. Oro - general surgery. No LT4 treatment has been required, TFTs have remained stable.     She continues to have some sweating in the middle of the night, anxiety. Not sleeping well.   Hysterectomy at age 41.           Review of Systems   All other systems reviewed and are negative.    Medical History Reviewed by provider this encounter:  Tobacco  Allergies  Meds  Problems  Med Hx  Surg Hx  Fam Hx     .     Objective   Ht 5' 7\" (1.702 m)   BMI 39.31 kg/m²      Physical Exam  Vitals reviewed.   Constitutional:       General: She is not in acute distress.     Appearance: She is not ill-appearing.   HENT:      Head: Normocephalic.   Pulmonary:      Effort: No respiratory distress.   Neurological:      Mental Status: She is alert. Mental status is at " baseline.   Psychiatric:         Mood and Affect: Mood normal.           Component      Latest Ref Rng 4/24/2023 7/1/2024 10/4/2024 2/27/2025   Sodium      135 - 147 mmol/L  139  139  136    Potassium      3.5 - 5.3 mmol/L  4.0  4.2  4.1    Chloride      96 - 108 mmol/L  103  103  100    Carbon Dioxide      21 - 32 mmol/L  30  29  32    ANION GAP      4 - 13 mmol/L  6  7  4    BUN      5 - 25 mg/dL  15  12  13    Creatinine      0.60 - 1.30 mg/dL  0.63  0.77  0.87    GLUCOSE      65 - 140 mg/dL   136     Calcium      8.4 - 10.2 mg/dL  9.6  9.2  9.6    AST      13 - 39 U/L   20  17    ALT      7 - 52 U/L   27  17    ALK PHOS      34 - 104 U/L   79  82    Total Protein      6.4 - 8.4 g/dL   6.8  6.7    Albumin      3.5 - 5.0 g/dL   4.1  4.2    Total Bilirubin      0.20 - 1.00 mg/dL   1.20 (H)  1.20 (H)    GFR, Calculated      ml/min/1.73sq m  102  88  76    GLUCOSE, FASTING      65 - 99 mg/dL  128 (H)   119 (H)    Cholesterol      See Comment mg/dL  150   145    Triglycerides      See Comment mg/dL  221 (H)   266 (H)    HDL      >=50 mg/dL  47 (L)   38 (L)    LDL Calculated      0 - 100 mg/dL  59   54    RENIN ACTIVITY PLASMA      0.167 - 5.380 ng/mL/hr 2.340       Aldosterone      0.0 - 30.0 ng/dL 3.3       JESSICA/PRA RATIO      0.0 - 30.0  1.4       EXT Creatinine Urine      Reference range not established. mg/dL    180.1    Albumin,U,Random      <20.0 mg/L    26.3 (H)    Albumin Creat Ratio      0 - 30 mg/g creatinine    15    Hemoglobin A1C      Normal 4.0-5.6%; PreDiabetic 5.7-6.4%; Diabetic >=6.5%; Glycemic control for adults with diabetes <7.0% %  7.4 (H)  6.6 (H)  6.6 (H)    eAG, EST AVG Glucose      mg/dl  166  143  143    TSH 3RD GENERATON      0.450 - 4.500 uIU/mL   2.918  1.992    FREE T4      0.61 - 1.12 ng/dL    1.32 (H)       Legend:  (H) High  (L) Low

## 2025-03-03 NOTE — ASSESSMENT & PLAN NOTE
BP today stable 112/82. Some reduced GFR on recent labs.   Feels dry / dehydrated, suspect decreased PO fluid intake with GLP1.   Will trial reduced dosing HCTZ - 1/2 tablet - 12.5mg daily.   To follow BP at home to ensure stability.     Orders:    hydroCHLOROthiazide 25 mg tablet; Take 0.5 tablets (12.5 mg total) by mouth daily

## 2025-03-03 NOTE — ASSESSMENT & PLAN NOTE
Without elevation of LFTs, or concerning GI symptoms, jaundice. Will follow with CMP total bilirubin.  Orders:    Comprehensive metabolic panel; Future    Bilirubin, total; Future

## 2025-03-07 ENCOUNTER — OFFICE VISIT (OUTPATIENT)
Dept: URGENT CARE | Facility: CLINIC | Age: 55
End: 2025-03-07
Payer: COMMERCIAL

## 2025-03-07 VITALS
BODY MASS INDEX: 38.89 KG/M2 | DIASTOLIC BLOOD PRESSURE: 74 MMHG | HEART RATE: 100 BPM | SYSTOLIC BLOOD PRESSURE: 126 MMHG | OXYGEN SATURATION: 96 % | HEIGHT: 67 IN | WEIGHT: 247.8 LBS | TEMPERATURE: 97.7 F | RESPIRATION RATE: 18 BRPM

## 2025-03-07 DIAGNOSIS — J02.9 SORE THROAT: Primary | ICD-10-CM

## 2025-03-07 LAB — S PYO AG THROAT QL: NEGATIVE

## 2025-03-07 PROCEDURE — 87070 CULTURE OTHR SPECIMN AEROBIC: CPT

## 2025-03-07 PROCEDURE — S9083 URGENT CARE CENTER GLOBAL: HCPCS

## 2025-03-07 PROCEDURE — G0383 LEV 4 HOSP TYPE B ED VISIT: HCPCS

## 2025-03-07 PROCEDURE — 87880 STREP A ASSAY W/OPTIC: CPT

## 2025-03-07 RX ORDER — FLUTICASONE PROPIONATE 50 MCG
1 SPRAY, SUSPENSION (ML) NASAL DAILY
Qty: 11.1 ML | Refills: 0 | Status: SHIPPED | OUTPATIENT
Start: 2025-03-07

## 2025-03-07 NOTE — PROGRESS NOTES
[facility]        NAME: Chary Schmidt is a 55 y.o. female  : 1970    MRN: [id number]  DATE: 2025  TIME: 5:22 PM    Assessment and Plan   Sore throat [J02.9]  1. Sore throat  POCT rapid ANTIGEN strepA    Throat culture    fluticasone (FLONASE) 50 mcg/act nasal spray        Negative rapid strep, sending for culture.  Nasal steroid for facial pressure/headache.  Recommended continuing regimen or stopping Celebrex and just doing ibuprofen every 6-8 hours.  Continue doing Tylenol.  Will follow-up with results.    Patient Instructions     Patient Instructions   Viral symptoms may last for a total of 1-3 weeks. Symptoms typically start with a sore throat and progress to include sinus pain/pressure, runny nose (yellow/green), and congestion/cough. The yellow/green color does not necessarily indicate a bacterial sinus infection. If your sinus symptoms worsen on day 10-14, you may want to consider re-evaluation for a possible secondary bacterial sinus infection. Coughs are typically most bothersome the first 1-2 weeks. Coughs frequently linger for 4-6 weeks. However, have your lungs re-evaluated if you develop sudden worsening cough, shortness or breath or chest discomfort.       Vitamin D3 2000 IU daily  Vitamin C 1000mg twice per day  Some studies suggest that Zinc 12.5-15mg every 2 hours while awake x 5 days may shorten the duration cold symptoms by 1-2 days.   Fluids and rest  Nasal saline spray (Extra Strength) 3 drops in each nostril 4x per day may shorten the duration of illness by 1-2 days and help prevent spread.  Afrin if severe congestion (do not use for more than 3 days)  Over the counter cold medication as needed (EX: Mucinex DM, Sudafed I.e. pseudoephedrine, Tylenol, Flonase)  Sinus Rinses (use distilled water only and carefully follow instructions)  Salt water gargles and chloraseptic spray  Throat Coat Tea (herbal licorice root tea for sore throat-add honey unless diabetic)  Warm  compresses over sinuses  Steam treatment (utilize proper safety precautions when in contact with hot water/steam)      Follow up with PCP in 3-5 days.  Proceed to  ER if symptoms worsen.    Chief Complaint     Chief Complaint   Patient presents with    Cold Like Symptoms     Sinus congestion,headache,body aches and sore throat          History of Present Illness       55-year-old female PMH diabetes presents with cold symptoms x 4 days.  Patient reports most symptoms symptoms resolving however still having a lingering headache and sore throat.  Patient concerned that could be strep throat, is post tonsillectomy however had strep in the past despite having tonsils removed.  Reports body aches and fatigue and cough have improved.  Using Tylenol, Celebrex and and Excedrin which improve symptoms while on it however does not plan to give it of headache.  Reports cold symptoms going around her job.        Review of Systems   Review of Systems   Constitutional:  Positive for fatigue. Negative for chills, diaphoresis and fever.   HENT:  Positive for congestion, postnasal drip and sore throat. Negative for ear discharge, ear pain, rhinorrhea and sinus pressure.    Respiratory:  Positive for cough. Negative for choking.    Cardiovascular:  Negative for chest pain and palpitations.   Gastrointestinal:  Positive for nausea. Negative for diarrhea and vomiting.   Musculoskeletal:  Positive for myalgias. Negative for arthralgias.   Skin:  Negative for color change.   Neurological:  Positive for headaches. Negative for dizziness and light-headedness.         Current Medications       Current Outpatient Medications:     amLODIPine (NORVASC) 10 mg tablet, Take 1 tablet (10 mg total) by mouth daily, Disp: 90 tablet, Rfl: 3    atorvastatin (LIPITOR) 10 mg tablet, TAKE 1 TABLET BY MOUTH EVERY DAY, Disp: 90 tablet, Rfl: 1    celecoxib (CeleBREX) 400 MG capsule, Take 400 mg by mouth daily, Disp: , Rfl:     cholecalciferol (VITAMIN D3)  1,000 units tablet, Take 2 tablets (2,000 Units total) by mouth daily, Disp: 60 tablet, Rfl: 0    fluticasone (FLONASE) 50 mcg/act nasal spray, 1 spray into each nostril daily, Disp: 11.1 mL, Rfl: 0    hydroCHLOROthiazide 25 mg tablet, Take 0.5 tablets (12.5 mg total) by mouth daily, Disp: , Rfl:     lisinopril (ZESTRIL) 10 mg tablet, Take 1 tablet (10 mg total) by mouth daily, Disp: 90 tablet, Rfl: 3    Magnesium 250 MG TABS, Take 1 tablet (250 mg total) by mouth daily, Disp: 90 tablet, Rfl: 3    metoprolol tartrate (LOPRESSOR) 50 mg tablet, Take 1 tablet (50 mg total) by mouth 2 (two) times a day, Disp: 180 tablet, Rfl: 3    tirzepatide (Mounjaro) 15 MG/0.5ML, Inject 0.5 mL (15 mg total) under the skin every 7 days, Disp: 6 mL, Rfl: 3    mometasone (NASONEX) 50 mcg/act nasal spray, , Disp: , Rfl:     POTASSIUM PO, Take 99 mg by mouth daily (Patient not taking: Reported on 3/7/2025), Disp: , Rfl:     Current Allergies     Allergies as of 03/07/2025 - Reviewed 03/07/2025   Allergen Reaction Noted    Pollen extract Other (See Comments) 05/08/2023            The following portions of the patient's history were reviewed and updated as appropriate: allergies, current medications, past family history, past medical history, past social history, past surgical history and problem list.     Past Medical History:   Diagnosis Date    COVID-19     Diverticulosis     Dry eye     Hypertension     PONV (postoperative nausea and vomiting)     Seasonal allergies     Thyroid nodule     Von Willebrand disease (HCC)        Past Surgical History:   Procedure Laterality Date    ARTHROSCOPIC REPAIR ACL Left     CHOLECYSTECTOMY      COLONOSCOPY      HYSTERECTOMY  2010    partial hysterectomy    IR BIOPSY THYROID      KNEE ARTHROSCOPY Right     LIPOMA RESECTION      back    REDUCTION MAMMAPLASTY Bilateral 2016    THYROID LOBECTOMY Left 6/22/2023    Procedure: HEMITHYROIDECTOMY; LEFT;  Surgeon: Sana Oro MD;  Location: [facility];   "Service: Surgical Oncology    TONSILLECTOMY      US GUIDED LYMPH NODE BIOPSY LEFT  05/26/2023    US GUIDED THYROID BIOPSY  03/10/2023       Family History   Problem Relation Age of Onset    Hypertension Mother     Hyperlipidemia Mother     Diabetes type II Mother     Breast cancer Mother     Thyroid cancer Mother     Hypertension Father     Heart attack Father     Hyperlipidemia Father     Lung cancer Father     Hypertension Maternal Grandmother     Cancer Maternal Grandmother     No Known Problems Maternal Grandfather     No Known Problems Paternal Grandmother     No Known Problems Paternal Grandfather     Skin cancer Maternal Aunt     No Known Problems Paternal Aunt          Medications have been verified.        Objective   /74   Pulse 100   Temp 97.7 °F (36.5 °C) (Temporal)   Resp 18   Ht 5' 7\" (1.702 m)   Wt 112 kg (247 lb 12.8 oz)   SpO2 96%   BMI 38.81 kg/m²        Physical Exam     Physical Exam  Vitals and nursing note reviewed.   Constitutional:       General: She is not in acute distress.     Appearance: She is normal weight.   HENT:      Head: Normocephalic and atraumatic.      Right Ear: Tympanic membrane, ear canal and external ear normal.      Left Ear: Tympanic membrane, ear canal and external ear normal.      Nose: Congestion present.      Mouth/Throat:      Mouth: Mucous membranes are moist.      Pharynx: Oropharynx is clear. Posterior oropharyngeal erythema present.   Eyes:      General:         Right eye: No discharge.         Left eye: No discharge.      Conjunctiva/sclera: Conjunctivae normal.      Pupils: Pupils are equal, round, and reactive to light.   Cardiovascular:      Rate and Rhythm: Regular rhythm. Tachycardia present.      Pulses: Normal pulses.      Heart sounds: Normal heart sounds.   Pulmonary:      Effort: Pulmonary effort is normal.      Breath sounds: Normal breath sounds.   Abdominal:      General: Bowel sounds are normal.      Palpations: Abdomen is soft.      " Tenderness: There is no abdominal tenderness.   Lymphadenopathy:      Cervical: No cervical adenopathy.   Skin:     General: Skin is warm and dry.      Capillary Refill: Capillary refill takes less than 2 seconds.   Neurological:      General: No focal deficit present.      Mental Status: She is alert and oriented to person, place, and time.   Psychiatric:         Mood and Affect: Mood normal.         Behavior: Behavior normal.

## 2025-03-09 LAB — BACTERIA THROAT CULT: NORMAL

## 2025-05-02 ENCOUNTER — HOSPITAL ENCOUNTER (OUTPATIENT)
Dept: RADIOLOGY | Facility: CLINIC | Age: 55
End: 2025-05-02
Attending: NURSE PRACTITIONER
Payer: COMMERCIAL

## 2025-05-02 VITALS — WEIGHT: 240 LBS | HEIGHT: 67 IN | BODY MASS INDEX: 37.67 KG/M2

## 2025-05-02 DIAGNOSIS — Z12.31 ENCOUNTER FOR SCREENING MAMMOGRAM FOR MALIGNANT NEOPLASM OF BREAST: ICD-10-CM

## 2025-05-02 PROCEDURE — 77063 BREAST TOMOSYNTHESIS BI: CPT

## 2025-05-02 PROCEDURE — 77067 SCR MAMMO BI INCL CAD: CPT

## 2025-05-21 ENCOUNTER — APPOINTMENT (OUTPATIENT)
Dept: LAB | Facility: HOSPITAL | Age: 55
End: 2025-05-21
Attending: PHYSICIAN ASSISTANT
Payer: COMMERCIAL

## 2025-05-21 DIAGNOSIS — R53.83 OTHER FATIGUE: ICD-10-CM

## 2025-05-21 DIAGNOSIS — E80.6 HYPERBILIRUBINEMIA: ICD-10-CM

## 2025-05-21 DIAGNOSIS — C73 THYROID CANCER (HCC): ICD-10-CM

## 2025-05-21 LAB
25(OH)D3 SERPL-MCNC: 22.4 NG/ML (ref 30–100)
ALBUMIN SERPL BCG-MCNC: 4.2 G/DL (ref 3.5–5)
ALP SERPL-CCNC: 79 U/L (ref 34–104)
ALT SERPL W P-5'-P-CCNC: 19 U/L (ref 7–52)
ANION GAP SERPL CALCULATED.3IONS-SCNC: 7 MMOL/L (ref 4–13)
AST SERPL W P-5'-P-CCNC: 14 U/L (ref 13–39)
BASOPHILS # BLD AUTO: 0.03 THOUSANDS/ÂΜL (ref 0–0.1)
BASOPHILS NFR BLD AUTO: 0 % (ref 0–1)
BILIRUB SERPL-MCNC: 0.77 MG/DL (ref 0.2–1)
BUN SERPL-MCNC: 14 MG/DL (ref 5–25)
CALCIUM SERPL-MCNC: 8.9 MG/DL (ref 8.4–10.2)
CHLORIDE SERPL-SCNC: 106 MMOL/L (ref 96–108)
CO2 SERPL-SCNC: 26 MMOL/L (ref 21–32)
CREAT SERPL-MCNC: 0.69 MG/DL (ref 0.6–1.3)
EOSINOPHIL # BLD AUTO: 0.22 THOUSAND/ÂΜL (ref 0–0.61)
EOSINOPHIL NFR BLD AUTO: 3 % (ref 0–6)
ERYTHROCYTE [DISTWIDTH] IN BLOOD BY AUTOMATED COUNT: 12.4 % (ref 11.6–15.1)
FERRITIN SERPL-MCNC: 96 NG/ML (ref 30–307)
GFR SERPL CREATININE-BSD FRML MDRD: 99 ML/MIN/1.73SQ M
GLUCOSE P FAST SERPL-MCNC: 135 MG/DL (ref 65–99)
HCT VFR BLD AUTO: 41.2 % (ref 34.8–46.1)
HGB BLD-MCNC: 13.8 G/DL (ref 11.5–15.4)
IMM GRANULOCYTES # BLD AUTO: 0.04 THOUSAND/UL (ref 0–0.2)
IMM GRANULOCYTES NFR BLD AUTO: 1 % (ref 0–2)
IRON SATN MFR SERPL: 16 % (ref 15–50)
IRON SERPL-MCNC: 55 UG/DL (ref 50–212)
LYMPHOCYTES # BLD AUTO: 3 THOUSANDS/ÂΜL (ref 0.6–4.47)
LYMPHOCYTES NFR BLD AUTO: 34 % (ref 14–44)
MCH RBC QN AUTO: 29.8 PG (ref 26.8–34.3)
MCHC RBC AUTO-ENTMCNC: 33.5 G/DL (ref 31.4–37.4)
MCV RBC AUTO: 89 FL (ref 82–98)
MONOCYTES # BLD AUTO: 0.47 THOUSAND/ÂΜL (ref 0.17–1.22)
MONOCYTES NFR BLD AUTO: 5 % (ref 4–12)
NEUTROPHILS # BLD AUTO: 5.04 THOUSANDS/ÂΜL (ref 1.85–7.62)
NEUTS SEG NFR BLD AUTO: 57 % (ref 43–75)
NRBC BLD AUTO-RTO: 0 /100 WBCS
PLATELET # BLD AUTO: 391 THOUSANDS/UL (ref 149–390)
PMV BLD AUTO: 10.8 FL (ref 8.9–12.7)
POTASSIUM SERPL-SCNC: 4 MMOL/L (ref 3.5–5.3)
PROT SERPL-MCNC: 6.9 G/DL (ref 6.4–8.4)
RBC # BLD AUTO: 4.63 MILLION/UL (ref 3.81–5.12)
SODIUM SERPL-SCNC: 139 MMOL/L (ref 135–147)
T4 FREE SERPL-MCNC: 0.96 NG/DL (ref 0.61–1.12)
TIBC SERPL-MCNC: 337.4 UG/DL (ref 250–450)
TRANSFERRIN SERPL-MCNC: 241 MG/DL (ref 203–362)
TSH SERPL DL<=0.05 MIU/L-ACNC: 3.19 UIU/ML (ref 0.45–4.5)
UIBC SERPL-MCNC: 282 UG/DL (ref 155–355)
WBC # BLD AUTO: 8.8 THOUSAND/UL (ref 4.31–10.16)

## 2025-05-21 PROCEDURE — 80053 COMPREHEN METABOLIC PANEL: CPT

## 2025-05-21 PROCEDURE — 84443 ASSAY THYROID STIM HORMONE: CPT

## 2025-05-21 PROCEDURE — 84432 ASSAY OF THYROGLOBULIN: CPT

## 2025-05-21 PROCEDURE — 83550 IRON BINDING TEST: CPT

## 2025-05-21 PROCEDURE — 84439 ASSAY OF FREE THYROXINE: CPT

## 2025-05-21 PROCEDURE — 82306 VITAMIN D 25 HYDROXY: CPT

## 2025-05-21 PROCEDURE — 82728 ASSAY OF FERRITIN: CPT

## 2025-05-21 PROCEDURE — 83540 ASSAY OF IRON: CPT

## 2025-05-21 PROCEDURE — 85025 COMPLETE CBC W/AUTO DIFF WBC: CPT

## 2025-05-21 PROCEDURE — 36415 COLL VENOUS BLD VENIPUNCTURE: CPT

## 2025-05-21 PROCEDURE — 86800 THYROGLOBULIN ANTIBODY: CPT

## 2025-05-28 ENCOUNTER — RESULTS FOLLOW-UP (OUTPATIENT)
Dept: ENDOCRINOLOGY | Facility: CLINIC | Age: 55
End: 2025-05-28

## 2025-05-31 LAB
THYROGLOB AB SERPL-ACNC: 2.6 IU/ML (ref 0–0.9)
THYROGLOB SERPL-MCNC: <2 NG/ML

## 2025-06-02 ENCOUNTER — OFFICE VISIT (OUTPATIENT)
Dept: ENDOCRINOLOGY | Facility: CLINIC | Age: 55
End: 2025-06-02
Payer: COMMERCIAL

## 2025-06-02 VITALS
BODY MASS INDEX: 38.14 KG/M2 | WEIGHT: 243 LBS | DIASTOLIC BLOOD PRESSURE: 78 MMHG | HEART RATE: 79 BPM | HEIGHT: 67 IN | TEMPERATURE: 97.4 F | OXYGEN SATURATION: 96 % | SYSTOLIC BLOOD PRESSURE: 124 MMHG

## 2025-06-02 DIAGNOSIS — E11.9 TYPE 2 DIABETES MELLITUS WITHOUT COMPLICATION, WITHOUT LONG-TERM CURRENT USE OF INSULIN (HCC): ICD-10-CM

## 2025-06-02 DIAGNOSIS — I10 ESSENTIAL HYPERTENSION: ICD-10-CM

## 2025-06-02 DIAGNOSIS — C73 THYROID CANCER (HCC): Primary | ICD-10-CM

## 2025-06-02 DIAGNOSIS — E27.8 ADRENAL INCIDENTALOMA (HCC): ICD-10-CM

## 2025-06-02 PROCEDURE — 99213 OFFICE O/P EST LOW 20 MIN: CPT | Performed by: PHYSICIAN ASSISTANT

## 2025-06-02 RX ORDER — DEXAMETHASONE 1 MG
1 TABLET ORAL ONCE
Qty: 1 TABLET | Refills: 0 | Status: SHIPPED | OUTPATIENT
Start: 2025-06-02 | End: 2025-06-03

## 2025-06-02 NOTE — PROGRESS NOTES
Name: Chary Schmidt      : 1970      MRN: [id number]  Encounter Provider: Sarita Brown PA-C  Encounter Date: 2025   Encounter department: [facility]    Chief Complaint   Patient presents with    Follow-up     PT WOULD LIKE TO REVIEW ABNORMAL TEST RESULTS- Pt inquiring if an US of Thyroid is needed?   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  TFTs remain stable, no LT4 necessary.  25 - thyroglobulin Ab positive (2.6) however thyroglobulin remains WNL, reassuring.   Case reviewed with Dr. Lucas - No additional imaging necessary at this time.         Adrenal incidentaloma (HCC)  New finding - b/l adrenal nodules on CTA abd/pelvis  May 2025 - L side indeterminant; R consistent with benign adenoma. MRI abd scheduled 25.   Random cortisol, ACTH WNL. Aldosterone /renin ratio: 3 (renin 6.7, aldosterone 20)  Recommend low dose dexamethasone suppression test, with possible further workup to follow pending results.   Orders:    Cortisol Level,7-9 AM Specimen; Future    dexamethasone (DECADRON) 1 mg tablet; Take 1 tablet (1 mg total) by mouth 1 (one) time for 1 dose Take 1 mg dexamethasone at 11PM the evening prior to obtaining 8AM labwork (Patient not taking: Reported on 6/3/2025)    Type 2 diabetes mellitus without complication, without long-term current use of insulin (HCC)    Lab Results   Component Value Date    HGBA1C 6.6 (H) 2025   Well controlled on Mounjaro monotherapy.          Essential hypertension  Stable, controlled on Norvasc, HCTZ, BB.   Notable h/o weight loss, fatgue. To consider further reduction in anti-hypertensive regimen given low-normal BP.   ARR WNL, but aldosterone > 15 in setting of adrenal incidentaloma. To consider further testing, but may need medication withdrawal to limit interference.                "  History of Present Illness     Chary Schmidt is a 55 y.o. female with type 2 DM, thyroid cancer, here for routine follow up visit. She also has recent diagnosis of bilateral adrenal nodules.    For DM - she takes Mounjaro 15mg weekly as monotherapy. Some constipation, will try fiber supplement to help. Trying to increase fluid intake as well. BG checks when symptomatic which maybe once a month.   BG vaues are typically in 120 -135.     She has a h/o papillary micro carcinoma / Hurthle cell adenoma s/p R partial thyroidectomy in 2023.   LESTER - none. No LT3 treatment has been required, TFTs remained stable since surgery.     For hypertension, she reports BP has been stable at home.   She continues on norvasc 10mg daily, lisinopril 10mg daily. HCTZ recently reduced to 1/2 of prior dose - 12.5mg daily - this seems to keep BP stable with less dry mouth / dehydration.    Chary reports concerns over ongoing Fatigue, weight loss plateau. She has ongoing hirsuitism  Menses -  irregular long term - hysterectomy in 40s. H/o ovarian cysts. Reports Fertility issues, h/o trouble conceiving.     Of note, Losing job end of August, will lose insurance temporarily at that time as well.             Review of Systems   All other systems reviewed and are negative.   as per HPI  Medical History Reviewed by provider this encounter:  Tobacco  Allergies  Meds  Problems  Med Hx  Surg Hx  Fam Hx     .     Medical History Reviewed by provider this encounter:     .    Objective   /78 (BP Location: Left arm, Patient Position: Sitting, Cuff Size: Standard)   Pulse 79   Temp (!) 97.4 °F (36.3 °C) (Temporal)   Ht 5' 7\" (1.702 m)   Wt 110 kg (243 lb)   SpO2 96%   BMI 38.06 kg/m²      Body mass index is 38.06 kg/m².  Wt Readings from Last 3 Encounters:   06/02/25 110 kg (243 lb)   05/02/25 109 kg (240 lb)   03/07/25 112 kg (247 lb 12.8 oz)     Physical Exam    Labs: I have reviewed pertinent labs including:   Lab Results "   Component Value Date    HGBA1C 6.6 (H) 02/27/2025    HGBA1C 6.6 (H) 10/04/2024    HGBA1C 7.4 (H) 07/01/2024      Lab Results   Component Value Date    CREATININE 0.69 05/21/2025    CREATININE 0.72 04/29/2025    CREATININE 0.87 02/27/2025    BUN 14 05/21/2025    K 4.0 05/21/2025     05/21/2025    CO2 26 05/21/2025      eGFRcr   Date Value Ref Range Status   04/29/2025 99 >59 Final     eGFR   Date Value Ref Range Status   05/21/2025 99 ml/min/1.73sq m Final      HDL, Direct   Date Value Ref Range Status   02/27/2025 38 (L) >=50 mg/dL Final     Triglycerides   Date Value Ref Range Status   02/27/2025 266 (H) See Comment mg/dL Final     Comment:     Triglyceride:     0-9Y            <75mg/dL     10Y-17Y         <90 mg/dL       >=18Y     Normal          <150 mg/dL     Borderline High 150-199 mg/dL     High            200-499 mg/dL        Very High       >499 mg/dL    Specimen collection should occur prior to Metamizole administration due to the potential for falsely depressed results.      Lab Results   Component Value Date    AAU7XOREXQIV 3.186 05/21/2025      Lab Results   Component Value Date    FREET4 0.96 05/21/2025      Component      Latest Ref Rng 4/29/2025   GLUCOSE      65 - 99 mg/dL 121 (H) (E)   BUN      7 - 25 mg/dL 12 (E)   Creatinine      0.40 - 1.10 mg/dL 0.72 (E)   Sodium      135 - 145 mmol/L 139 (E)   Potassium      3.5 - 5.2 mmol/L 4.1 (E)   Chloride      100 - 109 mmol/L 104 (E)   Carbon Dioxide      21 - 31 mmol/L 28 (E)   Calcium      8.5 - 10.5 mg/dL 9.5 (E)   ANION GAP      3 - 11  7 (E)   GFR, Calculated      >59  99 (E)   eGFR Comment Interpretive information: calculated GFR (E)   THYROGLOBULIN AB      0.0 - 0.9 IU/mL    THYROGLOBULIN (TG-DIVINA)      ng/mL    Cortisol - AM      6.7 - 22.6 ug/dL      Component      Latest Ref Rng 5/21/2025 6/3/2025   GLUCOSE      65 - 99 mg/dL     BUN      7 - 25 mg/dL     Creatinine      0.40 - 1.10 mg/dL     Sodium      135 - 145 mmol/L     Potassium       3.5 - 5.2 mmol/L     Chloride      100 - 109 mmol/L     Carbon Dioxide      21 - 31 mmol/L     Calcium      8.5 - 10.5 mg/dL     ANION GAP      3 - 11      GFR, Calculated      >59      eGFR Comment     THYROGLOBULIN AB      0.0 - 0.9 IU/mL 2.6 (H)     THYROGLOBULIN (TG-DIVINA)      ng/mL <2.0     Cortisol - AM      6.7 - 22.6 ug/dL  5.1 (L)       Legend:  (H) High  (L) Low  (E) External lab result      Outside facility labs reviewed from 5/13/25 including Cortisol, ACTH, ARR.  Imaging results reviewed including: CTA abd pelvis May 2025.    There are no Patient Instructions on file for this visit.    Discussed with the patient and all questioned fully answered. She will call me if any problems arise.

## 2025-06-03 ENCOUNTER — APPOINTMENT (OUTPATIENT)
Dept: LAB | Facility: HOSPITAL | Age: 55
End: 2025-06-03
Payer: COMMERCIAL

## 2025-06-03 ENCOUNTER — OFFICE VISIT (OUTPATIENT)
Dept: CARDIOLOGY CLINIC | Facility: CLINIC | Age: 55
End: 2025-06-03
Payer: COMMERCIAL

## 2025-06-03 VITALS
HEART RATE: 89 BPM | DIASTOLIC BLOOD PRESSURE: 74 MMHG | HEIGHT: 67 IN | OXYGEN SATURATION: 95 % | BODY MASS INDEX: 38.14 KG/M2 | SYSTOLIC BLOOD PRESSURE: 122 MMHG | WEIGHT: 243 LBS | TEMPERATURE: 96.3 F

## 2025-06-03 DIAGNOSIS — E78.2 MIXED HYPERLIPIDEMIA: ICD-10-CM

## 2025-06-03 DIAGNOSIS — I10 ESSENTIAL HYPERTENSION: Primary | ICD-10-CM

## 2025-06-03 DIAGNOSIS — E27.8 ADRENAL INCIDENTALOMA (HCC): ICD-10-CM

## 2025-06-03 DIAGNOSIS — R53.83 OTHER FATIGUE: ICD-10-CM

## 2025-06-03 LAB — CORTIS AM PEAK SERPL-MCNC: 5.1 UG/DL (ref 6.7–22.6)

## 2025-06-03 PROCEDURE — 36415 COLL VENOUS BLD VENIPUNCTURE: CPT

## 2025-06-03 PROCEDURE — 93000 ELECTROCARDIOGRAM COMPLETE: CPT | Performed by: NURSE PRACTITIONER

## 2025-06-03 PROCEDURE — 82533 TOTAL CORTISOL: CPT

## 2025-06-03 PROCEDURE — 99214 OFFICE O/P EST MOD 30 MIN: CPT | Performed by: NURSE PRACTITIONER

## 2025-06-03 RX ORDER — METOPROLOL TARTRATE 25 MG/1
25 TABLET, FILM COATED ORAL 2 TIMES DAILY
Qty: 180 TABLET | Refills: 3 | Status: SHIPPED | OUTPATIENT
Start: 2025-06-03

## 2025-06-03 RX ORDER — TIRZEPATIDE 15 MG/.5ML
15 INJECTION, SOLUTION SUBCUTANEOUS
COMMUNITY
Start: 2025-03-29

## 2025-06-03 RX ORDER — SILVER SULFADIAZINE 10 MG/G
2 CREAM TOPICAL DAILY
COMMUNITY
Start: 2025-04-18 | End: 2025-06-03

## 2025-06-03 RX ORDER — LISINOPRIL 10 MG/1
10 TABLET ORAL DAILY
Qty: 90 TABLET | Refills: 3 | Status: SHIPPED | OUTPATIENT
Start: 2025-06-03

## 2025-06-03 RX ORDER — AMLODIPINE BESYLATE 10 MG/1
10 TABLET ORAL DAILY
Qty: 30 TABLET | Refills: 11 | Status: SHIPPED | OUTPATIENT
Start: 2025-06-03

## 2025-06-03 RX ORDER — HYDROCHLOROTHIAZIDE 12.5 MG/1
12.5 TABLET ORAL DAILY
Qty: 90 TABLET | Refills: 3 | Status: SHIPPED | OUTPATIENT
Start: 2025-06-03

## 2025-06-03 NOTE — ASSESSMENT & PLAN NOTE
Unclear etiology. Currently undergoing work up by endocrinology.  Will reduce metoprolol from 50 to 25 mg BID.  HCTZ already reduced to 12.5 mg by endo with no improvement in symptoms thus far.  Update echo to assess cardiac structure and function.  Declines DB testing, denies symptoms.  Reviewed urgent s/s to report.  Will monitor closely and consider stress testing pending echo results.

## 2025-06-03 NOTE — PATIENT INSTRUCTIONS
Update echocardiogram to assess heart structure and function  Reduce metoprolol to 25 mg twice daily.  Update me with BP/HR in 1-2 weeks.   Consider lowering amlodipine to 5 mg based on your response to the above.  Continue the 12.5 mg of HCTZ (I did send a 12.5 mg tab for you)  We keep lisinopril to protect your kidneys with diabetes.  We should continue to monitor your triglycerides, goal is < 200

## 2025-06-03 NOTE — ASSESSMENT & PLAN NOTE
Patient with longstanding hypertension since age 21.  Blood pressure is in excellent control.  Currently on amlodipine 10mg daily, HCTZ 12.5 mg daily, lisinopril 10mg, and metoprolol tartrate 50mg BID.  HCTZ recently reduced by drew.  Notes fatigue - reduce metoprolol from 50 mg to 25 mg BID.  She will update me on BP/HR in 1-2 weeks via the portal.   Consider reducing amlodipine from 10 to 5 mg daily.  Recent lab work reviewed.  Continue with 2g sodium diet, exercise, weight reduction.

## 2025-06-03 NOTE — ASSESSMENT & PLAN NOTE
Persistently elevated on lab work.  Pt has metabolic syndrome with T2DM. Endocrinology discontinued Fenofibrate and initiated Atorvastatin 10mg on 10/28/2021.  TG remains elevated at 266 on recent labs. LDL at goal at 54.   Could resume Fenofibrate if needed for triglyceride management.  Goal LDL < 70 given T2DM

## 2025-06-03 NOTE — ASSESSMENT & PLAN NOTE
Persistently elevated on lab work.  Pt has metabolic syndrome with T2DM. Endocrinology discontinued Fenofibrate and initiated Atorvastatin 10mg on 10/28/2021.  TG remains elevated at 266 on recent labs.  Could resume Fenofibrate if needed for triglyceride management.  Goal LDL < 70 given T2DM

## 2025-06-09 ENCOUNTER — RESULTS FOLLOW-UP (OUTPATIENT)
Dept: ENDOCRINOLOGY | Facility: CLINIC | Age: 55
End: 2025-06-09

## 2025-06-09 ENCOUNTER — PATIENT MESSAGE (OUTPATIENT)
Dept: ENDOCRINOLOGY | Facility: CLINIC | Age: 55
End: 2025-06-09

## 2025-06-09 DIAGNOSIS — E27.8 ADRENAL INCIDENTALOMA (HCC): Primary | ICD-10-CM

## 2025-06-12 ENCOUNTER — RESULTS FOLLOW-UP (OUTPATIENT)
Dept: CARDIOLOGY CLINIC | Facility: CLINIC | Age: 55
End: 2025-06-12

## 2025-06-12 ENCOUNTER — HOSPITAL ENCOUNTER (OUTPATIENT)
Dept: NON INVASIVE DIAGNOSTICS | Facility: HOSPITAL | Age: 55
Discharge: HOME | End: 2025-06-12
Attending: NURSE PRACTITIONER
Payer: COMMERCIAL

## 2025-06-12 VITALS
HEIGHT: 67 IN | BODY MASS INDEX: 38.14 KG/M2 | SYSTOLIC BLOOD PRESSURE: 120 MMHG | WEIGHT: 243 LBS | DIASTOLIC BLOOD PRESSURE: 70 MMHG | HEART RATE: 70 BPM

## 2025-06-12 DIAGNOSIS — R53.83 OTHER FATIGUE: ICD-10-CM

## 2025-06-12 DIAGNOSIS — I10 ESSENTIAL HYPERTENSION: ICD-10-CM

## 2025-06-12 LAB
AORTIC ROOT: 3.1 CM
ASCENDING AORTA: 3.8 CM
BSA FOR ECHO PROCEDURE: 2.2 M2
E WAVE DECELERATION TIME: 231 MS
E/A RATIO: 0.69
FRACTIONAL SHORTENING: 32 (ref 28–44)
INTERVENTRICULAR SEPTUM IN DIASTOLE (PARASTERNAL SHORT AXIS VIEW): 1.3 CM
INTERVENTRICULAR SEPTUM: 1.3 CM (ref 0.6–1.1)
LAAS-AP2: 15.5 CM2
LAAS-AP4: 19.6 CM2
LEFT ATRIUM SIZE: 3.8 CM
LEFT ATRIUM VOLUME (MOD BIPLANE): 47 ML
LEFT ATRIUM VOLUME INDEX (MOD BIPLANE): 21.4 ML/M2
LEFT INTERNAL DIMENSION IN SYSTOLE: 2.5 CM (ref 2.1–4)
LEFT VENTRICULAR INTERNAL DIMENSION IN DIASTOLE: 3.7 CM (ref 3.5–6)
LEFT VENTRICULAR POSTERIOR WALL IN END DIASTOLE: 1.2 CM
LEFT VENTRICULAR STROKE VOLUME: 36 ML
LV EF US.2D.A4C+ESTIMATED: 66 %
LVSV (TEICH): 36 ML
MV E'TISSUE VEL-LAT: 10 CM/S
MV E'TISSUE VEL-SEP: 7 CM/S
MV PEAK A VEL: 1.14 M/S
MV PEAK E VEL: 79 CM/S
MV STENOSIS PRESSURE HALF TIME: 67 MS
MV VALVE AREA P 1/2 METHOD: 3.28
RA PRESSURE ESTIMATED: 3 MMHG
RIGHT ATRIUM AREA SYSTOLE A4C: 11.2 CM2
RIGHT VENTRICLE ID DIMENSION: 3.4 CM
RV PSP: 30 MMHG
SL CV LEFT ATRIUM LENGTH A2C: 5.1 CM
SL CV LV EF: 65
SL CV PED ECHO LEFT VENTRICLE DIASTOLIC VOLUME (MOD BIPLANE) 2D: 59 ML
SL CV PED ECHO LEFT VENTRICLE SYSTOLIC VOLUME (MOD BIPLANE) 2D: 22 ML
TR MAX PG: 27 MMHG
TR PEAK VELOCITY: 2.6 M/S
TRICUSPID ANNULAR PLANE SYSTOLIC EXCURSION: 2.2 CM
TRICUSPID VALVE PEAK REGURGITATION VELOCITY: 2.6 M/S

## 2025-06-12 PROCEDURE — 93306 TTE W/DOPPLER COMPLETE: CPT

## 2025-06-13 PROBLEM — E27.8 ADRENAL INCIDENTALOMA (HCC): Status: ACTIVE | Noted: 2025-06-13

## 2025-06-13 NOTE — ASSESSMENT & PLAN NOTE
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  TFTs remain stable, no LT4 necessary.  5/21/25 - thyroglobulin Ab positive (2.6) however thyroglobulin remains WNL, reassuring.   Case reviewed with Dr. Lucas - No additional imaging necessary at this time.

## 2025-06-13 NOTE — ASSESSMENT & PLAN NOTE
Lab Results   Component Value Date    HGBA1C 6.6 (H) 02/27/2025   Well controlled on Mounjaro monotherapy.

## 2025-06-13 NOTE — ASSESSMENT & PLAN NOTE
New finding - b/l adrenal nodules on CTA abd/pelvis  May 2025 - L side indeterminant; R consistent with benign adenoma. MRI abd scheduled 5/8/25.   Random cortisol, ACTH WNL. Aldosterone /renin ratio: 3 (renin 6.7, aldosterone 20)  Recommend low dose dexamethasone suppression test, with possible further workup to follow pending results.   Orders:    Cortisol Level,7-9 AM Specimen; Future    dexamethasone (DECADRON) 1 mg tablet; Take 1 tablet (1 mg total) by mouth 1 (one) time for 1 dose Take 1 mg dexamethasone at 11PM the evening prior to obtaining 8AM labwork (Patient not taking: Reported on 6/3/2025)

## 2025-06-13 NOTE — ASSESSMENT & PLAN NOTE
Stable, controlled on Norvasc, HCTZ, BB.   Notable h/o weight loss, fatgue. To consider further reduction in anti-hypertensive regimen given low-normal BP.   ARR WNL, but aldosterone > 15 in setting of adrenal incidentaloma. To consider further testing, but may need medication withdrawal to limit interference.

## 2025-06-17 ENCOUNTER — HOSPITAL ENCOUNTER (OUTPATIENT)
Dept: ULTRASOUND IMAGING | Facility: CLINIC | Age: 55
Discharge: HOME | End: 2025-06-17
Attending: NURSE PRACTITIONER
Payer: COMMERCIAL

## 2025-06-17 ENCOUNTER — HOSPITAL ENCOUNTER (OUTPATIENT)
Dept: MAMMOGRAPHY | Facility: CLINIC | Age: 55
Discharge: HOME | End: 2025-06-17
Attending: NURSE PRACTITIONER
Payer: COMMERCIAL

## 2025-06-17 DIAGNOSIS — R92.8 ABNORMAL SCREENING MAMMOGRAM: ICD-10-CM

## 2025-06-17 PROCEDURE — 77065 DX MAMMO INCL CAD UNI: CPT

## 2025-06-17 PROCEDURE — G0279 TOMOSYNTHESIS, MAMMO: HCPCS

## 2025-06-17 PROCEDURE — 76642 ULTRASOUND BREAST LIMITED: CPT

## 2025-06-17 NOTE — PROGRESS NOTES
Met with patient and Dr. Lipscomb  regarding recommendation for;    _____ RIGHT ___x___LEFT      __x___Ultrasound guided  ______Stereotactic breast biopsy.      __X___Verbalized understanding.    Reviewed clip placement with patient, pt states understanding: Yes: _x___ No: ____  Comments:    Blood thinners:  No: __x___ Yes: ______ What:          Biopsy teaching sheet given:  Yes: ___X___ No: ________    Pt given contact information and adv to call with any questions/needs    Patient advised to arrive at 0830 for a 0900 appointment

## 2025-06-20 ENCOUNTER — APPOINTMENT (OUTPATIENT)
Dept: LAB | Facility: HOSPITAL | Age: 55
End: 2025-06-20

## 2025-07-02 ENCOUNTER — HOSPITAL ENCOUNTER (OUTPATIENT)
Dept: RADIOLOGY | Facility: CLINIC | Age: 55
Discharge: HOME | End: 2025-07-02
Attending: NURSE PRACTITIONER
Payer: COMMERCIAL

## 2025-07-02 VITALS — DIASTOLIC BLOOD PRESSURE: 88 MMHG | HEART RATE: 69 BPM | SYSTOLIC BLOOD PRESSURE: 137 MMHG

## 2025-07-02 VITALS — BODY MASS INDEX: 38.14 KG/M2 | HEIGHT: 67 IN | WEIGHT: 243 LBS

## 2025-07-02 DIAGNOSIS — R92.8 ABNORMAL FINDING ON BREAST IMAGING: ICD-10-CM

## 2025-07-02 PROCEDURE — 88305 TISSUE EXAM BY PATHOLOGIST: CPT | Performed by: STUDENT IN AN ORGANIZED HEALTH CARE EDUCATION/TRAINING PROGRAM

## 2025-07-02 PROCEDURE — A4648 IMPLANTABLE TISSUE MARKER: HCPCS

## 2025-07-02 PROCEDURE — 19083 BX BREAST 1ST LESION US IMAG: CPT

## 2025-07-02 RX ORDER — LIDOCAINE HYDROCHLORIDE 10 MG/ML
5 INJECTION, SOLUTION EPIDURAL; INFILTRATION; INTRACAUDAL; PERINEURAL ONCE
Status: COMPLETED | OUTPATIENT
Start: 2025-07-02 | End: 2025-07-02

## 2025-07-02 RX ADMIN — LIDOCAINE HYDROCHLORIDE 5 ML: 10 INJECTION, SOLUTION EPIDURAL; INFILTRATION; INTRACAUDAL; PERINEURAL at 09:34

## 2025-07-02 NOTE — DISCHARGE INSTR - OTHER ORDERS
POST LARGE CORE BREAST BIOPSY PATIENT INFORMATION      Place an ice pack inside your bra over the top of the dressing every hour for 20 minutes (20 minutes on, 60 minutes off). Do this until bedtime.    Do not shower or bathe until the following morning. After bathing, you may remove the bandaid.    You may bathe your breast carefully with the steri-strips in place.  Be careful not to loosen them.  The steri-strips will fall off in 3-5 days.    You may have mild discomfort, and you may have some bruising where the needle entered the skin.  This should clear within 5-7 days.    If you need medicine for discomfort, take acetaminophen products such as Tylenol. You may also take Advil or Motrin products. DO NOT use Aspirin for the first 24 hours.    Do not participate in strenuous activities such as-tennis, aerobics, weight lifting, etc. for 24 hours. Refrain from swimming/soaking for 72 hours.    Wearing a bra for sleeping may be more comfortable for the first 24-48 hours after your biopsy.    Watch for continued bleeding, pain or fever over 101.  If any of these symptoms occur, please contact our breast nurse navigator at the location where your biopsy was performed.      During normal business hours (7:30am - 4:00pm) please call the nurse navigator at the site     where your procedure was performed:       [facility]: [phone] or [phone]     [facility]: [phone] or [phone]     [facility]: [phone]     [facility]: [phone]     [facility]: [phone]        After 4pm - please call your physician or go to the nearest Emergency Department location.    The final results of your biopsy are usually available within one week.

## 2025-07-02 NOTE — PROGRESS NOTES
Procedure type:    __x___ultrasound guided _____stereotactic _____ MRI guided    Breast:    __x___Left _____Right    Location: 4:00 11cmfn    Needle: 12g Abilio    # of passes: 3    Clip: butterfly    Performed by: Dr. Carbone    Pressure held for 5 minutes by: Sydney De Los Santos Strips:    __x___yes _____no    Band aid:    ___x__yes_____no    Tape and guaze:    _____yes __x___no    Tolerated procedure:    __x___yes _____no

## 2025-07-03 ENCOUNTER — APPOINTMENT (OUTPATIENT)
Dept: LAB | Facility: HOSPITAL | Age: 55
End: 2025-07-03

## 2025-07-03 PROCEDURE — 88305 TISSUE EXAM BY PATHOLOGIST: CPT | Performed by: STUDENT IN AN ORGANIZED HEALTH CARE EDUCATION/TRAINING PROGRAM

## 2025-07-07 ENCOUNTER — TELEPHONE (OUTPATIENT)
Dept: MAMMOGRAPHY | Facility: CLINIC | Age: 55
End: 2025-07-07

## 2025-07-08 ENCOUNTER — TELEPHONE (OUTPATIENT)
Dept: MAMMOGRAPHY | Facility: CLINIC | Age: 55
End: 2025-07-08

## 2025-07-08 NOTE — TELEPHONE ENCOUNTER
Jarvis Ash,     I wanted to let you know that the above patient had a left breast biopsy with benign results. I have attempted twice to reach the patient to discuss her results and recommendation with no return call. The radiologist's recommendation is the patient can go back to routine screening mammogram.     If you have any questions or need further assistance please let me know.     Thank you,  Silvia Breen RN, BSN  Breast Nurse Navigator

## 2025-07-23 ENCOUNTER — OFFICE VISIT (OUTPATIENT)
Dept: CARDIOLOGY CLINIC | Facility: CLINIC | Age: 55
End: 2025-07-23
Payer: COMMERCIAL

## 2025-07-23 VITALS
TEMPERATURE: 97.7 F | WEIGHT: 237.8 LBS | OXYGEN SATURATION: 98 % | BODY MASS INDEX: 37.32 KG/M2 | DIASTOLIC BLOOD PRESSURE: 90 MMHG | HEART RATE: 81 BPM | SYSTOLIC BLOOD PRESSURE: 124 MMHG | HEIGHT: 67 IN

## 2025-07-23 DIAGNOSIS — E78.2 MIXED HYPERLIPIDEMIA: ICD-10-CM

## 2025-07-23 DIAGNOSIS — I10 ESSENTIAL HYPERTENSION: ICD-10-CM

## 2025-07-23 PROCEDURE — 99214 OFFICE O/P EST MOD 30 MIN: CPT | Performed by: NURSE PRACTITIONER

## 2025-07-23 RX ORDER — AMLODIPINE BESYLATE 10 MG/1
10 TABLET ORAL DAILY
Qty: 90 TABLET | Refills: 3 | Status: SHIPPED | OUTPATIENT
Start: 2025-07-23

## 2025-07-23 RX ORDER — ATORVASTATIN CALCIUM 10 MG/1
10 TABLET, FILM COATED ORAL DAILY
Qty: 90 TABLET | Refills: 3 | Status: SHIPPED | OUTPATIENT
Start: 2025-07-23

## 2025-07-23 RX ORDER — METHYLPREDNISOLONE 4 MG
4 TABLET, DOSE PACK ORAL DAILY
COMMUNITY
Start: 2025-07-22 | End: 2025-07-28

## 2025-07-23 NOTE — PROGRESS NOTES
Cardiology Follow Up    Chary Schmidt  1970  [id number]  [facility] [place] [address]  [address]LO[address]  [facility] [address] [phone]    Chary presents for follow up for HTN, HLD.     1. Essential hypertension  Assessment & Plan:  Patient with longstanding hypertension since age 21.  Blood pressure is in excellent control.  Currently on amlodipine 10mg daily, HCTZ 12.5 mg daily, lisinopril 10mg, and metoprolol tartrate 25 mg BID.  Consider reducing amlodipine from 10 to 5 mg daily if BP < 110/70  Recent lab work reviewed.  Continue with 2g sodium diet, exercise, weight reduction.  Orders:  -     amLODIPine (NORVASC) 10 mg tablet; Take 1 tablet (10 mg total) by mouth daily  2. Mixed hyperlipidemia  Assessment & Plan:  Persistently elevated on lab work.  Pt has metabolic syndrome with T2DM. Endocrinology discontinued Fenofibrate and initiated Atorvastatin 10mg on 10/28/2021.  TG remains elevated at 266 on recent labs. LDL at goal at 54.   Could resume Fenofibrate if needed for triglyceride management.  Goal LDL < 70 given T2DM  Orders:  -     atorvastatin (LIPITOR) 10 mg tablet; Take 1 tablet (10 mg total) by mouth daily     PATO Diez has a past medical history of HTN, HLD, T2DM, follicular thyroid cancer s/p aida-thyroidectomy,Von Willebrand disease, Covid 19 pneumonia, and GERD.     She previously followed with Dr. De Los Santos at [facility] who has since left the practice.      She started treatment for HTN in the year 2000 (in her late 20's).  Echocardiogram 05/2019 showed normal LV function with an EF of 60% and grade 2 diastolic dysfunction.  She has a family history of premature coronary artery disease in her father who had an MI in his 50s.     She met in consultation with Dr. Magana on 1/26/2021. She was diagnosed with Covid pneumonia in December 2020 and hospitalized in [facility] ICU from 12/29/20-1/2/2021. Her HCTZ was stopped during her  admission, suspected to be secondary to hypokalemia during admission. She was sent home with supplemental O2. Her potassium improved and HCTZ was resumed following that initial visit.    She followed up with me most recently on 6/3/2025 at which time she was undergoing a work up of an adrenal adenoma through endocrinology. She noted profound fatigue. She works night shift but adamantly denied DB symptoms. Endo had reduced HCTZ from 25 to 12.5 mg daily with no improvement in symptoms. ECG showed NSR. She denied chest pain, shortness of breath, lightheadedness, or palpitations. She was on Mounjaro and losing weight. We opted to reduce her metoprolol from 50 to 25 mg.     7/23/2025: Freda presents today for a routine follow up for HTN. She denies fatigue since reducing the metoprolol and states she has more energy than before. She denies chest discomfort, palpitations, pain, shortness of breath, dizziness, edema, lightheadedness with position change. Over all she feels well and appears in very good health overall. She is waiting on a possible [place] offer in [place] due to her current employer closing in August.     Medical Problems       Problem List       Morbid obesity (HCC)    Type 2 diabetes mellitus without complication, without long-term current use of insulin (HCC)    Von Willebrand disease (HCC)    Acid reflux    Multiple thyroid nodules    Pulmonary nodule    Mixed hyperlipidemia    Thyroid nodule    Personal history of malignant neoplasm of thyroid    Encounter for follow-up surveillance of thyroid cancer    Other fatigue    Hyperbilirubinemia    Thyroid cancer (HCC)    Essential hypertension    Hypertriglyceridemia    Adrenal incidentaloma (HCC)        Past Medical History[1]  Social History     Socioeconomic History    Marital status: Single     Spouse name: Not on file    Number of children: Not on file    Years of education: Not on file    Highest education level: Not on file   Occupational History    Not on  file   Tobacco Use    Smoking status: Never    Smokeless tobacco: Never   Vaping Use    Vaping status: Never Used   Substance and Sexual Activity    Alcohol use: Not Currently     Comment: social    Drug use: Never    Sexual activity: Not Currently     Partners: Male     Comment: Defer   Other Topics Concern    Not on file   Social History Narrative    Not on file     Social Drivers of Health     Financial Resource Strain: Not At Risk (4/29/2025)    Received from [facility]    Financial Insecurity     In the last 12 months did you skip medications to save money?: No     In the last 12 months was there a time when you needed to see a doctor but could not because of cost?: No   Food Insecurity: No Food Insecurity (4/29/2025)    Received from [facility]    Food Insecurity     In the last 12 months did you ever eat less than you felt you should because there wasn't enough money for food?: No   Transportation Needs: No Transportation Needs (4/29/2025)    Received from [facility]    Transportation Needs     In the last 12 months have you ever had to go without healthcare because you didn't have a way to get there?: No   Physical Activity: Not on file   Stress: Not on file   Social Connections: Socially Integrated (4/29/2025)    Received from [facility]    Social Connection     Do you often feel lonely?: No   Intimate Partner Violence: Not on file   Housing Stability: Not At Risk (4/29/2025)    Received from [facility]    Housing Stability     Are you worried that in the next 2 months you may not have stable housing?: No      Family History[2]  Past Surgical History[3]  Current Medications[4]  Allergies   Allergen Reactions    Pollen Extract Other (See Comments)     Seasonal        Labs:     Chemistry        Component Value Date/Time    K 4.0 05/21/2025 1012    K 4.1 04/29/2025 1205     05/21/2025 1012     04/29/2025 1205  "   CO2 26 05/21/2025 1012    CO2 28 04/29/2025 1205    BUN 14 05/21/2025 1012    BUN 12 04/29/2025 1205    CREATININE 0.69 05/21/2025 1012    CREATININE 0.72 04/29/2025 1205        Component Value Date/Time    CALCIUM 8.9 05/21/2025 1012    CALCIUM 9.5 04/29/2025 1205    ALKPHOS 79 05/21/2025 1012    AST 14 05/21/2025 1012    ALT 19 05/21/2025 1012        ECG 6/3/2025: Normal sinus rhythm. Rate 86 bpm. Normal ECG.      Lipid panel 2/27/2025: C 145. T 266. H 38. L 54.      Lipid panel 8/17/2023: C 181. T 350. H 37. L 74.      ECG 5/5/2023: Normal sinus rhythm. 83 bpm. Low voltage QRS.     Lipid panel 7/26/2022: C 140. T 233. H 39. L 54.      Lipid panel 9/11/2019: C 174. T 299. H 36. L 78.      Echocardiogram 05/17/2019:  EF 60%.  Grade 2 diastolic dysfunction.  Left atrial size upper limit of normal.      Review of Systems   Constitutional: Negative.   HENT: Negative.     Eyes: Negative.    Cardiovascular: Negative.    Respiratory: Negative.     Endocrine: Negative.    Hematologic/Lymphatic: Negative.    Skin: Negative.    Musculoskeletal: Negative.    Gastrointestinal: Negative.    Genitourinary: Negative.    Neurological: Negative.    Psychiatric/Behavioral: Negative.     Allergic/Immunologic: Negative.        Vitals:    07/23/25 1054   BP: 124/90   Pulse: 81   Temp: 97.7 °F (36.5 °C)   SpO2: 98%     Vitals:    07/23/25 1054   Weight: 108 kg (237 lb 12.8 oz)     Height: 5' 7\" (170.2 cm)   Body mass index is 37.24 kg/m².    Physical Exam  Vitals and nursing note reviewed.   Constitutional:       Appearance: Normal appearance. She is well-developed. She is obese. She is not ill-appearing.   HENT:      Head: Normocephalic and atraumatic.      Nose: Nose normal.      Mouth/Throat:      Mouth: Mucous membranes are moist.      Pharynx: Oropharynx is clear.     Eyes:      General: Lids are normal.      Extraocular Movements: Extraocular movements intact.      Conjunctiva/sclera: Conjunctivae normal.      Pupils: Pupils " are equal, round, and reactive to light.     Neck:      Thyroid: No thyroid mass.      Vascular: No carotid bruit.      Trachea: Trachea and phonation normal.     Cardiovascular:      Rate and Rhythm: Normal rate and regular rhythm.      Pulses: Normal pulses and intact distal pulses.           Radial pulses are 2+ on the right side and 2+ on the left side.      Heart sounds: Normal heart sounds, S1 normal and S2 normal. No murmur heard.     No friction rub. No gallop.   Pulmonary:      Effort: Pulmonary effort is normal. No accessory muscle usage or respiratory distress.      Breath sounds: Normal breath sounds and air entry. No wheezing.   Abdominal:      General: Abdomen is flat. There is no distension.      Palpations: Abdomen is soft.      Tenderness: There is no abdominal tenderness.     Musculoskeletal:         General: Normal range of motion.      Cervical back: Normal range of motion and neck supple.   Lymphadenopathy:      Cervical: No cervical adenopathy.     Skin:     General: Skin is warm and dry.      Capillary Refill: Capillary refill takes less than 2 seconds.      Coloration: Skin is not cyanotic or jaundiced.     Neurological:      General: No focal deficit present.      Mental Status: She is alert and oriented to person, place, and time.      GCS: GCS eye subscore is 4. GCS verbal subscore is 5. GCS motor subscore is 6.      Cranial Nerves: Cranial nerves 2-12 are intact.      Sensory: Sensation is intact.      Motor: Motor function is intact.      Coordination: Coordination is intact.      Gait: Gait is intact.     Psychiatric:         Attention and Perception: Attention and perception normal.         Mood and Affect: Mood and affect normal.         Speech: Speech normal.         Behavior: Behavior normal. Behavior is cooperative.         Thought Content: Thought content normal.         Cognition and Memory: Cognition and memory normal.         Judgment: Judgment normal.                       [1]    Past Medical History:  Diagnosis Date    Allergic     seasonal    Cancer (HCC)     Clotting disorder (HCC)     von willebrands disease    COVID-19     Diabetes mellitus (HCC)     Diverticulitis of colon     Diverticulosis     Dry eye     Heart murmur     Hypertension     Obesity     Otitis media     Pneumonia     PONV (postoperative nausea and vomiting)     Seasonal allergies     Stroke (HCC)     Thyroid nodule     Urinary tract infection     Von Willebrand disease (HCC)    [2]   Family History  Problem Relation Name Age of Onset    Hypertension Mother Keri Nieves     Hyperlipidemia Mother Keri Nieves     Diabetes type II Mother Keri Nieves     Breast cancer Mother Keri Nieves     Thyroid cancer Mother Keri Nieves     Cancer Mother Keri Nieves     Diabetes Mother Keri Nieves     Arthritis Mother Keri Nieves     Hypertension Father Edmund Nieves     Heart attack Father Edmund Nieves     Hyperlipidemia Father Edmund Nieves     Lung cancer Father Edmund Nieves     Cancer Father Edmund Nieves     Hypertension Maternal Grandmother Ellyn Zulma     Cancer Maternal Grandmother Ellyn Zulma     No Known Problems Maternal Grandfather      No Known Problems Paternal Grandmother      No Known Problems Paternal Grandfather      Skin cancer Maternal Aunt      No Known Problems Paternal Aunt      Colon cancer Neg Hx      Endometrial cancer Neg Hx      Ovarian cancer Neg Hx     [3]   Past Surgical History:  Procedure Laterality Date    ARTHROSCOPIC REPAIR ACL Left     CHOLECYSTECTOMY      COLONOSCOPY      HYSTERECTOMY  2010    partial hysterectomy    IR BIOPSY THYROID      KNEE ARTHROSCOPY Right     LIPOMA RESECTION      back    LYMPH NODE BIOPSY  2023    REDUCTION MAMMAPLASTY Bilateral 2016    THYROID LOBECTOMY Left 06/22/2023    Procedure: HEMITHYROIDECTOMY; LEFT;  Surgeon: Sana Oro MD;  Location: [facility];  Service: Surgical Oncology    TONSILLECTOMY      US GUIDED BREAST BIOPSY  LEFT COMPLETE Left 7/2/2025    US GUIDED LYMPH NODE BIOPSY LEFT  05/26/2023    US GUIDED THYROID BIOPSY  03/10/2023   [4]   Current Outpatient Medications:     amLODIPine (NORVASC) 10 mg tablet, Take 1 tablet (10 mg total) by mouth daily, Disp: 90 tablet, Rfl: 3    atorvastatin (LIPITOR) 10 mg tablet, Take 1 tablet (10 mg total) by mouth daily, Disp: 90 tablet, Rfl: 3    cholecalciferol (VITAMIN D3) 1,000 units tablet, Take 2 tablets (2,000 Units total) by mouth daily, Disp: 60 tablet, Rfl: 0    hydroCHLOROthiazide 12.5 mg tablet, Take 1 tablet (12.5 mg total) by mouth daily, Disp: 90 tablet, Rfl: 3    lisinopril (ZESTRIL) 10 mg tablet, Take 1 tablet (10 mg total) by mouth daily, Disp: 90 tablet, Rfl: 3    Magnesium 250 MG TABS, Take 1 tablet (250 mg total) by mouth daily, Disp: 90 tablet, Rfl: 3    Medrol 4 MG tablet therapy pack, Take 4 mg by mouth daily, Disp: , Rfl:     metoprolol tartrate (LOPRESSOR) 25 mg tablet, Take 1 tablet (25 mg total) by mouth 2 (two) times a day, Disp: 180 tablet, Rfl: 3    Mounjaro 15 MG/0.5ML SOAJ, Inject 15 mg as directed every 7 days, Disp: , Rfl:     POTASSIUM PO, Take 99 mg by mouth daily, Disp: , Rfl:

## 2025-07-23 NOTE — PATIENT INSTRUCTIONS
Continue current medication doses.  IF BP is persistently < 110/70 we can reduce the amlodipine to 5 mg

## 2025-07-24 NOTE — ASSESSMENT & PLAN NOTE
Patient with longstanding hypertension since age 21.  Blood pressure is in excellent control.  Currently on amlodipine 10mg daily, HCTZ 12.5 mg daily, lisinopril 10mg, and metoprolol tartrate 25 mg BID.  Consider reducing amlodipine from 10 to 5 mg daily if BP < 110/70  Recent lab work reviewed.  Continue with 2g sodium diet, exercise, weight reduction.

## 2025-07-30 ENCOUNTER — APPOINTMENT (OUTPATIENT)
Dept: LAB | Facility: HOSPITAL | Age: 55
End: 2025-07-30
Payer: COMMERCIAL

## 2025-07-30 DIAGNOSIS — E27.8 ADRENAL INCIDENTALOMA (HCC): ICD-10-CM

## 2025-07-30 LAB
CREAT 24H UR-MRATE: 1.2 G/24HR (ref 0.6–1.8)
PERIOD: 22.5 HOURS
SODIUM 24H UR-SRATE: 174.8 MMOL/24 HRS (ref 40–220)
SPECIMEN VOL UR: 2300 ML
SPECIMEN VOL UR: 2300 ML

## 2025-07-30 PROCEDURE — 82088 ASSAY OF ALDOSTERONE: CPT

## 2025-07-30 PROCEDURE — 82570 ASSAY OF URINE CREATININE: CPT

## 2025-07-30 PROCEDURE — 82530 CORTISOL FREE: CPT

## 2025-07-30 PROCEDURE — 84300 ASSAY OF URINE SODIUM: CPT

## 2025-08-04 LAB
CORTIS F 24H UR-MRATE: 12 UG/24 HR (ref 6–42)
CORTIS F UR-MCNC: 5 UG/L

## 2025-08-08 LAB
ALDOST 24H UR-MRATE: <5.75 UG/24 HR (ref 0–19)
ALDOST UR-MCNC: <2.5 UG/L

## (undated) DEVICE — SUT VICRYL 3-0 SH 27 IN J416H

## (undated) DEVICE — SUT SILK 3-0 18 IN A184H

## (undated) DEVICE — ADHESIVE SKIN HIGH VISCOSITY EXOFIN 1ML

## (undated) DEVICE — SUT STRATAFIX SPIRAL 4-0 PGA/PCL 30 X 30 CM SXMD2B409

## (undated) DEVICE — INTENDED FOR TISSUE SEPARATION, AND OTHER PROCEDURES THAT REQUIRE A SHARP SURGICAL BLADE TO PUNCTURE OR CUT.: Brand: BARD-PARKER SAFETY BLADES SIZE 15, STERILE

## (undated) DEVICE — SUT SILK 3-0 SH CR/8 18 IN C013D

## (undated) DEVICE — HOOK ELASTIC STAY 12MM BLUNT SNGL STRL

## (undated) DEVICE — ROSEBUD DISSECTORS: Brand: DEROYAL

## (undated) DEVICE — ANTIBACTERIAL UNDYED BRAIDED (POLYGLACTIN 910), SYNTHETIC ABSORBABLE SUTURE: Brand: COATED VICRYL

## (undated) DEVICE — SURGICEL 4 X 8

## (undated) DEVICE — PACK UNIVERSAL NECK

## (undated) DEVICE — ELECTRODE BLADE MOD E-Z CLEAN 2.5IN 6.4CM -0012M

## (undated) DEVICE — HARMONIC 1100 SHEARS, 20CM SHAFT LENGTH: Brand: HARMONIC

## (undated) DEVICE — 3M™ STERI-STRIP™ REINFORCED ADHESIVE SKIN CLOSURES, R1547, 1/2 IN X 4 IN (12 MM X 100 MM), 6 STRIPS/ENVELOPE: Brand: 3M™ STERI-STRIP™

## (undated) DEVICE — SUT SILK 2-0 18 IN A185H

## (undated) DEVICE — GLOVE SRG BIOGEL 6.5

## (undated) DEVICE — SUT SILK 2-0 SH CR/8 18 IN C012D

## (undated) DEVICE — POOLE SUCTION HANDLE: Brand: CARDINAL HEALTH

## (undated) DEVICE — MINOR PROCEDURE DRAPE: Brand: CONVERTORS